# Patient Record
Sex: MALE | Employment: OTHER | URBAN - METROPOLITAN AREA
[De-identification: names, ages, dates, MRNs, and addresses within clinical notes are randomized per-mention and may not be internally consistent; named-entity substitution may affect disease eponyms.]

---

## 2017-01-05 ENCOUNTER — OFFICE VISIT (OUTPATIENT)
Dept: FAMILY MEDICINE CLINIC | Age: 67
End: 2017-01-05

## 2017-01-05 VITALS
SYSTOLIC BLOOD PRESSURE: 132 MMHG | HEART RATE: 82 BPM | HEIGHT: 73 IN | TEMPERATURE: 97.7 F | OXYGEN SATURATION: 98 % | BODY MASS INDEX: 33.29 KG/M2 | DIASTOLIC BLOOD PRESSURE: 76 MMHG | WEIGHT: 251.2 LBS | RESPIRATION RATE: 28 BRPM

## 2017-01-05 DIAGNOSIS — E78.2 MIXED HYPERLIPIDEMIA: ICD-10-CM

## 2017-01-05 DIAGNOSIS — K21.00 GASTROESOPHAGEAL REFLUX DISEASE WITH ESOPHAGITIS: ICD-10-CM

## 2017-01-05 DIAGNOSIS — J30.89 PERENNIAL ALLERGIC RHINITIS, UNSPECIFIED ALLERGIC RHINITIS TRIGGER: ICD-10-CM

## 2017-01-05 DIAGNOSIS — I10 ESSENTIAL HYPERTENSION, BENIGN: Primary | ICD-10-CM

## 2017-01-05 RX ORDER — PREDNISOLONE ACETATE 10 MG/ML
SUSPENSION/ DROPS OPHTHALMIC
Refills: 1 | COMMUNITY
Start: 2016-12-29 | End: 2019-10-07

## 2017-01-05 NOTE — MR AVS SNAPSHOT
Visit Information Date & Time Provider Department Dept. Phone Encounter #  
 1/5/2017  9:45 AM Nate Taylor [phone] [id number] Follow-up Instructions Return in about 4 months (around 5/5/2017). Upcoming Health Maintenance Date Due ZOSTER VACCINE AGE 60> 11/8/2012 COLONOSCOPY 2/17/2024 DTaP/Tdap/Td series (2 - Td) 7/8/2026 Allergies as of 1/5/2017  Review Complete On: 1/5/2017 By: Will Flores Severity Noted Reaction Type Reactions Diclofenac  08/11/2010    Other (comments) Ringing in ears Propoxyphene-acetaminophen  08/11/2010    Rash  
 Sulfa (Sulfonamide Antibiotics)  08/11/2010    Shortness of Breath Pt states he is not allergic. Zithromax [Azithromycin]  08/08/2014    Rash Current Immunizations  Reviewed on 10/20/2014 Name Date Influenza Vaccine 10/20/2014 Influenza Vaccine PF 1/3/2014 Not reviewed this visit You Were Diagnosed With   
  
 Codes Comments Essential hypertension, benign    -  Primary ICD-10-CM: I10 
ICD-9-CM: 401.1 Gastroesophageal reflux disease with esophagitis     ICD-10-CM: K21.0 ICD-9-CM: 530.11 Mixed hyperlipidemia     ICD-10-CM: E78.2 ICD-9-CM: 272.2 Perennial allergic rhinitis, unspecified allergic rhinitis trigger     ICD-10-CM: J30.89 ICD-9-CM: 477.8 Vitals BP Pulse Temp Resp Height(growth percentile) Weight(growth percentile) 132/76 (BP 1 Location: Left arm) 82 97.7 °F (36.5 °C) (Oral) 28 6' 1\" (1.854 m) 251 lb 3.2 oz (113.9 kg) SpO2 BMI Smoking Status 98% 33.14 kg/m2 Never Smoker Vitals History BMI and BSA Data Body Mass Index Body Surface Area  
 33.14 kg/m 2 2.42 m 2 Preferred Pharmacy Pharmacy Name Phone [id number] [address] [facility] AT [address] [place] [address] [phone] Your Updated Medication List  
  
   
 This list is accurate as of: 1/5/17 10:19 AM.  Always use your most recent med list.  
  
  
  
  
 albuterol 90 mcg/actuation inhaler Commonly known as:  PROAIR HFA Take 2 Puffs by inhalation every six (6) hours as needed for Wheezing. ALPHAGAN P 0.15 % ophthalmic solution Generic drug:  brimonidine Administer 1 Drop to both eyes three (3) times daily. ALREX 0.2 % Drps Generic drug:  loteprednol etabonate Administer 1 Drop to both eyes four (4) times daily. amLODIPine 10 mg tablet Commonly known as:  Tad Jamie Take 1 Tab by mouth daily. aspirin 81 mg tablet Take 81 mg by mouth. azelastine 137 mcg (0.1 %) nasal spray Commonly known as:  ASTELIN  
1 Spray by Both Nostrils route two (2) times a day. Use in each nostril as directed AZOPT 1 % ophthalmic suspension Generic drug:  brinzolamide BETIMOL 0.5 % ophthalmic solution Generic drug:  timolol Administer 1 Drop to both eyes two (2) times a day. use in affected eye(s)  
  
 fluticasone 50 mcg/actuation nasal spray Commonly known as:  Araceli Shames 2 Sprays by Both Nostrils route daily for 360 days. hydroCHLOROthiazide 12.5 mg capsule Commonly known as:  Vertie Cheadle TAKE ONE CAPSULE BY MOUTH EVERY DAY  
  
 ibuprofen 800 mg tablet Commonly known as:  MOTRIN  
TAKE 1 TABLET BY MOUTH EVERY 8 HOURS AS NEEDED FOR PAIN  
  
 indomethacin 50 mg capsule Commonly known as:  INDOCIN  
TAKE 1 CAPSULE TWICE A DAY  
  
 LUMIGAN 0.01 % ophthalmic drops Generic drug:  bimatoprost  
  
 methazolAMIDE 50 mg tablet Commonly known as:  NEPTAZANE  
  
 NASAL SPRAY (OXYMETAZOLINE) 0.05 % nasal spray Generic drug:  oxymetazoline INHALE 2 SPRAYS IN EACH OSTRIL EVERY 4 HOURS FOR 10 DAYS  
  
 prednisoLONE acetate 1 % ophthalmic suspension Commonly known as:  PRED FORTE INSTILL 1 DROP INTO RIGHT EYE 4 TIMES A DAY  
  
 ramipril 10 mg capsule Commonly known as:  ALTACE  
 TAKE 2 CAPSULES BY MOUTH EVERY DAY  
  
 SALINE NASAL 0.65 % nasal spray Generic drug:  sodium chloride INHALE 2 SPRAYS IN EACH NOSTRIL 4 TIMES A DAY FOR 14 DAYS  
  
 sodium chloride irrigation 0.9 % irrigation IRRIGATE EACH NASAL CAVITY WITH 60CCS OF NORMAL SALINE SOLUTION DAILY. DISP: 1 LITER Follow-up Instructions Return in about 4 months (around 5/5/2017). Introducing [facility] SERVICES! Faviola Titus introduces "[employer] patient portal. Now you can access parts of your medical record, email your doctor's office, and request medication refills online. 1. In your internet browser, go to [email or web address] 2. Click on the First Time User? Click Here link in the Sign In box. You will see the New Member Sign Up page. 3. Enter your "[employer] Access Code exactly as it appears below. You will not need to use this code after youve completed the sign-up process. If you do not sign up before the expiration date, you must request a new code. · "[employer] Access Code: [email or web address] Expires: 4/5/2017 10:19 AM 
 
4. Enter the last four digits of your Social Security Number (xxxx) and Date of Birth (mm/dd/yyyy) as indicated and click Submit. You will be taken to the next sign-up page. 5. Create a "[employer] ID. This will be your "[employer] login ID and cannot be changed, so think of one that is secure and easy to remember. 6. Create a "[employer] password. You can change your password at any time. 7. Enter your Password Reset Question and Answer. This can be used at a later time if you forget your password. 8. Enter your e-mail address. You will receive e-mail notification when new information is available in [address] 9. Click Sign Up. You can now view and download portions of your medical record. 10. Click the Download Summary menu link to download a portable copy of your medical information.  
 
If you have questions, please visit the Frequently Asked Questions section of the [employer]. Remember, [employer] is NOT to be used for urgent needs. For medical emergencies, dial 911. Now available from your iPhone and Android! Please provide this summary of care documentation to your next provider. Your primary care clinician is listed as Kandis Auguste. If you have any questions after today's visit, please call [phone].

## 2017-01-05 NOTE — PROGRESS NOTES
HISTORY OF PRESENT ILLNESS  Vnadana Lopez is a 61 y.o. male. f/u hbp,chol,gout,oa,s/p recent eye surgery  Hypertension    The history is provided by the patient. This is a chronic problem. The problem has not changed since onset. Pertinent negatives include no chest pain, no orthopnea, no palpitations, no malaise/fatigue, no headaches and no peripheral edema. Cholesterol Problem   This is a chronic problem. The problem occurs daily. The problem has not changed since onset. Pertinent negatives include no chest pain and no headaches. Review of Systems   Constitutional: Negative for fever, malaise/fatigue and weight loss. Cardiovascular: Negative for chest pain, palpitations and orthopnea. Genitourinary: Negative for dysuria and urgency. Musculoskeletal: Positive for joint pain. Neurological: Negative for headaches. Physical Exam   Constitutional: He is oriented to person, place, and time. He appears well-developed and well-nourished. HENT:   Head: Normocephalic and atraumatic. Right Ear: External ear normal.   Left Ear: External ear normal.   Nose: Nose normal.   Mouth/Throat: Oropharynx is clear and moist.   Eyes: Conjunctivae are normal. Pupils are equal, round, and reactive to light. Neck: Normal range of motion. Neck supple. No tracheal deviation present. No thyromegaly present. Cardiovascular: Normal rate, regular rhythm, normal heart sounds and intact distal pulses. Pulmonary/Chest: Effort normal and breath sounds normal. No respiratory distress. He has no wheezes. Abdominal: Soft. Bowel sounds are normal. He exhibits no distension. Lymphadenopathy:     He has no cervical adenopathy. Neurological: He is alert and oriented to person, place, and time. Skin: Skin is warm and dry. Psychiatric: He has a normal mood and affect. Vitals reviewed. ASSESSMENT and PLAN  Eun Elena was seen today for hypertension and cholesterol problem.     Diagnoses and all orders for this visit:    Essential hypertension, benign  -     METABOLIC PANEL, COMPREHENSIVE  -     CBC WITH AUTOMATED DIFF    Gastroesophageal reflux disease with esophagitis    Mixed hyperlipidemia  -     LIPID PANEL    Perennial allergic rhinitis, unspecified allergic rhinitis trigger    Doing well,continue current meds and treatments    Follow-up Disposition:  Return in about 4 months (around 5/5/2017).

## 2017-01-05 NOTE — PROGRESS NOTES
Chief Complaint   Patient presents with    Hypertension     F/U on BP.  Cholesterol Problem     F/U on cholesterol.

## 2017-01-06 LAB
ALBUMIN SERPL-MCNC: 4.5 G/DL (ref 3.6–4.8)
ALBUMIN/GLOB SERPL: 1.5 {RATIO} (ref 1.1–2.5)
ALP SERPL-CCNC: 83 IU/L (ref 39–117)
ALT SERPL-CCNC: 23 IU/L (ref 0–44)
AST SERPL-CCNC: 19 IU/L (ref 0–40)
BASOPHILS # BLD AUTO: 0.1 X10E3/UL (ref 0–0.2)
BASOPHILS NFR BLD AUTO: 1 %
BILIRUB SERPL-MCNC: 0.9 MG/DL (ref 0–1.2)
BUN SERPL-MCNC: 10 MG/DL (ref 8–27)
BUN/CREAT SERPL: 10 (ref 10–22)
CALCIUM SERPL-MCNC: 9.7 MG/DL (ref 8.6–10.2)
CHLORIDE SERPL-SCNC: 97 MMOL/L (ref 96–106)
CHOLEST SERPL-MCNC: 229 MG/DL (ref 100–199)
CO2 SERPL-SCNC: 24 MMOL/L (ref 18–29)
CREAT SERPL-MCNC: 1.01 MG/DL (ref 0.76–1.27)
EOSINOPHIL # BLD AUTO: 0.5 X10E3/UL (ref 0–0.4)
EOSINOPHIL NFR BLD AUTO: 6 %
ERYTHROCYTE [DISTWIDTH] IN BLOOD BY AUTOMATED COUNT: 14.5 % (ref 12.3–15.4)
GLOBULIN SER CALC-MCNC: 3.1 G/DL (ref 1.5–4.5)
GLUCOSE SERPL-MCNC: 107 MG/DL (ref 65–99)
HCT VFR BLD AUTO: 39.1 % (ref 37.5–51)
HDLC SERPL-MCNC: 45 MG/DL
HGB BLD-MCNC: 13.2 G/DL (ref 12.6–17.7)
IMM GRANULOCYTES # BLD: 0 X10E3/UL (ref 0–0.1)
IMM GRANULOCYTES NFR BLD: 0 %
INTERPRETATION, 910389: NORMAL
LDLC SERPL CALC-MCNC: 149 MG/DL (ref 0–99)
LYMPHOCYTES # BLD AUTO: 2.2 X10E3/UL (ref 0.7–3.1)
LYMPHOCYTES NFR BLD AUTO: 29 %
MCH RBC QN AUTO: 28.4 PG (ref 26.6–33)
MCHC RBC AUTO-ENTMCNC: 33.8 G/DL (ref 31.5–35.7)
MCV RBC AUTO: 84 FL (ref 79–97)
MONOCYTES # BLD AUTO: 0.7 X10E3/UL (ref 0.1–0.9)
MONOCYTES NFR BLD AUTO: 9 %
NEUTROPHILS # BLD AUTO: 4.3 X10E3/UL (ref 1.4–7)
NEUTROPHILS NFR BLD AUTO: 55 %
PLATELET # BLD AUTO: 339 X10E3/UL (ref 150–379)
POTASSIUM SERPL-SCNC: 4 MMOL/L (ref 3.5–5.2)
PROT SERPL-MCNC: 7.6 G/DL (ref 6–8.5)
RBC # BLD AUTO: 4.65 X10E6/UL (ref 4.14–5.8)
SODIUM SERPL-SCNC: 138 MMOL/L (ref 134–144)
TRIGL SERPL-MCNC: 173 MG/DL (ref 0–149)
VLDLC SERPL CALC-MCNC: 35 MG/DL (ref 5–40)
WBC # BLD AUTO: 7.7 X10E3/UL (ref 3.4–10.8)

## 2017-01-12 NOTE — TELEPHONE ENCOUNTER
----- Message from Velia Webb sent at 1/12/2017 11:47 AM EST -----  Regarding: Dr aMrio Butler  Contact: [phone]  Pt is requesting a refill on his Vertigo medication to be sent to the [facility] on file

## 2017-01-13 RX ORDER — MECLIZINE HYDROCHLORIDE 25 MG/1
25 TABLET ORAL
Qty: 40 TAB | Refills: 1 | Status: SHIPPED | OUTPATIENT
Start: 2017-01-13 | End: 2017-01-23

## 2017-02-08 ENCOUNTER — HOSPITAL ENCOUNTER (EMERGENCY)
Age: 67
Discharge: HOME | End: 2017-02-08
Attending: EMERGENCY MEDICINE
Payer: COMMERCIAL

## 2017-02-08 ENCOUNTER — APPOINTMENT (OUTPATIENT)
Dept: GENERAL RADIOLOGY | Age: 67
End: 2017-02-08
Attending: EMERGENCY MEDICINE
Payer: COMMERCIAL

## 2017-02-08 ENCOUNTER — APPOINTMENT (OUTPATIENT)
Dept: ULTRASOUND IMAGING | Age: 67
End: 2017-02-08
Attending: EMERGENCY MEDICINE
Payer: COMMERCIAL

## 2017-02-08 VITALS
OXYGEN SATURATION: 96 % | HEIGHT: 73 IN | BODY MASS INDEX: 32.52 KG/M2 | RESPIRATION RATE: 16 BRPM | DIASTOLIC BLOOD PRESSURE: 83 MMHG | TEMPERATURE: 98.3 F | SYSTOLIC BLOOD PRESSURE: 143 MMHG | HEART RATE: 104 BPM | WEIGHT: 245.37 LBS

## 2017-02-08 DIAGNOSIS — R42 LIGHTHEADED: Primary | ICD-10-CM

## 2017-02-08 LAB
ALBUMIN SERPL BCP-MCNC: 4.2 G/DL (ref 3.5–5)
ALBUMIN/GLOB SERPL: 1.1 {RATIO} (ref 1.1–2.2)
ALP SERPL-CCNC: 85 U/L (ref 45–117)
ALT SERPL-CCNC: 29 U/L (ref 12–78)
ANION GAP BLD CALC-SCNC: 9 MMOL/L (ref 5–15)
APPEARANCE UR: CLEAR
AST SERPL W P-5'-P-CCNC: 20 U/L (ref 15–37)
BACTERIA URNS QL MICRO: NEGATIVE /HPF
BASOPHILS # BLD AUTO: 0.1 K/UL (ref 0–0.1)
BASOPHILS # BLD: 1 % (ref 0–1)
BILIRUB SERPL-MCNC: 1 MG/DL (ref 0.2–1)
BILIRUB UR QL: NEGATIVE
BNP SERPL-MCNC: 51 PG/ML (ref 0–125)
BUN SERPL-MCNC: 10 MG/DL (ref 6–20)
BUN/CREAT SERPL: 10 (ref 12–20)
CALCIUM SERPL-MCNC: 9.5 MG/DL (ref 8.5–10.1)
CHLORIDE SERPL-SCNC: 101 MMOL/L (ref 97–108)
CK SERPL-CCNC: 130 U/L (ref 39–308)
CO2 SERPL-SCNC: 29 MMOL/L (ref 21–32)
COLOR UR: NORMAL
CREAT SERPL-MCNC: 1.02 MG/DL (ref 0.7–1.3)
EOSINOPHIL # BLD: 0.4 K/UL (ref 0–0.4)
EOSINOPHIL NFR BLD: 4 % (ref 0–7)
EPITH CASTS URNS QL MICRO: NORMAL /LPF
ERYTHROCYTE [DISTWIDTH] IN BLOOD BY AUTOMATED COUNT: 13.7 % (ref 11.5–14.5)
GLOBULIN SER CALC-MCNC: 3.9 G/DL (ref 2–4)
GLUCOSE SERPL-MCNC: 107 MG/DL (ref 65–100)
GLUCOSE UR STRIP.AUTO-MCNC: NEGATIVE MG/DL
HCT VFR BLD AUTO: 41.2 % (ref 36.6–50.3)
HGB BLD-MCNC: 14.1 G/DL (ref 12.1–17)
HGB UR QL STRIP: NEGATIVE
HYALINE CASTS URNS QL MICRO: NORMAL /LPF (ref 0–5)
KETONES UR QL STRIP.AUTO: NEGATIVE MG/DL
LEUKOCYTE ESTERASE UR QL STRIP.AUTO: NEGATIVE
LYMPHOCYTES # BLD AUTO: 38 % (ref 12–49)
LYMPHOCYTES # BLD: 3.2 K/UL (ref 0.8–3.5)
MCH RBC QN AUTO: 29.2 PG (ref 26–34)
MCHC RBC AUTO-ENTMCNC: 34.2 G/DL (ref 30–36.5)
MCV RBC AUTO: 85.3 FL (ref 80–99)
MONOCYTES # BLD: 0.6 K/UL (ref 0–1)
MONOCYTES NFR BLD AUTO: 8 % (ref 5–13)
NEUTS SEG # BLD: 4 K/UL (ref 1.8–8)
NEUTS SEG NFR BLD AUTO: 49 % (ref 32–75)
NITRITE UR QL STRIP.AUTO: NEGATIVE
PH UR STRIP: 8 [PH] (ref 5–8)
PLATELET # BLD AUTO: 319 K/UL (ref 150–400)
POTASSIUM SERPL-SCNC: 3.4 MMOL/L (ref 3.5–5.1)
PROT SERPL-MCNC: 8.1 G/DL (ref 6.4–8.2)
PROT UR STRIP-MCNC: NEGATIVE MG/DL
RBC # BLD AUTO: 4.83 M/UL (ref 4.1–5.7)
RBC #/AREA URNS HPF: NORMAL /HPF (ref 0–5)
SODIUM SERPL-SCNC: 139 MMOL/L (ref 136–145)
SP GR UR REFRACTOMETRY: 1.01 (ref 1–1.03)
TROPONIN I SERPL-MCNC: <0.04 NG/ML
UA: UC IF INDICATED,UAUC: NORMAL
UROBILINOGEN UR QL STRIP.AUTO: 0.2 EU/DL (ref 0.2–1)
WBC # BLD AUTO: 8.3 K/UL (ref 4.1–11.1)
WBC URNS QL MICRO: NORMAL /HPF (ref 0–4)

## 2017-02-08 PROCEDURE — 96360 HYDRATION IV INFUSION INIT: CPT

## 2017-02-08 PROCEDURE — 80053 COMPREHEN METABOLIC PANEL: CPT | Performed by: EMERGENCY MEDICINE

## 2017-02-08 PROCEDURE — 82550 ASSAY OF CK (CPK): CPT | Performed by: EMERGENCY MEDICINE

## 2017-02-08 PROCEDURE — 93970 EXTREMITY STUDY: CPT

## 2017-02-08 PROCEDURE — 85025 COMPLETE CBC W/AUTO DIFF WBC: CPT | Performed by: EMERGENCY MEDICINE

## 2017-02-08 PROCEDURE — 84484 ASSAY OF TROPONIN QUANT: CPT | Performed by: EMERGENCY MEDICINE

## 2017-02-08 PROCEDURE — 99284 EMERGENCY DEPT VISIT MOD MDM: CPT

## 2017-02-08 PROCEDURE — 36415 COLL VENOUS BLD VENIPUNCTURE: CPT | Performed by: EMERGENCY MEDICINE

## 2017-02-08 PROCEDURE — 83880 ASSAY OF NATRIURETIC PEPTIDE: CPT | Performed by: EMERGENCY MEDICINE

## 2017-02-08 PROCEDURE — 93005 ELECTROCARDIOGRAM TRACING: CPT

## 2017-02-08 PROCEDURE — 74011250636 HC RX REV CODE- 250/636: Performed by: EMERGENCY MEDICINE

## 2017-02-08 PROCEDURE — 71020 XR CHEST PA LAT: CPT

## 2017-02-08 PROCEDURE — 81001 URINALYSIS AUTO W/SCOPE: CPT | Performed by: EMERGENCY MEDICINE

## 2017-02-08 RX ORDER — MECLIZINE HCL 12.5 MG 12.5 MG/1
TABLET ORAL
COMMUNITY
End: 2017-07-10 | Stop reason: SDUPTHER

## 2017-02-08 RX ADMIN — SODIUM CHLORIDE 500 ML: 900 INJECTION, SOLUTION INTRAVENOUS at 19:25

## 2017-02-08 NOTE — ED PROVIDER NOTES
HPI Comments: Shearon Opitz is a 61 y.o. male who has a h/o hypertension and glaucoma and presents to [address] [facility] ED ambulatory with cc of lightheadedness which is exacerbated with standing x 2-3 weeks. The patient denies dizziness, \"room spinning\" sensation. He denies any syncopal episodes. In addition to his chief complaint, the patient also reports that his chronic BLE edema appears to have progressed x 2 weeks. He denies any known trauma or injuries to his BLE or any recent major surgeries or long trips. He also denies any h/o heart failure or PE/DVT. Pt also reports concern for elevated blood pressure. He saw his PCP 1.5 weeks ago at which time he was recommended to decrease his amlodipine from 10mg to 5mg; he was also started on meclizine 12.5 mg. The patient was compliant with these recommendations however it did not decrease his BP or improve his light-headedness, and as such he called his PCP 5 days ago, and was then recommended to increase his ramipril. Pt notes history of glaucoma and is followed by ophthalmology who he recently saw and had eye drop medication doses adjusted. He denies all other symptoms at this time, including any headaches, eye pain, chest pain, sob, coughing, nausea or vomiting. PCP: Rafy Clement MD    There are no other complaints changes or physical findings at this time  Written by Va Cai ED Scribe as dictated by Vineet Bell MD.    The history is provided by the patient.         Past Medical History:   Diagnosis Date    Allergic rhinitis, cause unspecified 11/4/2010    Asthma 11/4/2010    Cervical radiculitis 11/9/2012    Encounter for long-term (current) use of other medications 11/26/2010    Essential hypertension, benign 11/4/2010    Glaucoma 11/4/2010    Hypertension     Mixed hyperlipidemia 5/27/2011    Nocturia 11/9/2012    OA (osteoarthritis) 11/26/2010       Past Surgical History:   Procedure Laterality Date    Pr nasal scopy,open maxill sinus      Hx colonoscopy           Family History:   Problem Relation Age of Onset    No Known Problems Mother     No Known Problems Father     No Known Problems Brother     Cancer Brother        Social History     Social History    Marital status:      Spouse name: N/A    Number of children: N/A    Years of education: N/A     Occupational History    Not on file. Social History Main Topics    Smoking status: Never Smoker    Smokeless tobacco: Never Used    Alcohol use No    Drug use: No    Sexual activity: Yes     Partners: Female     Other Topics Concern    Not on file     Social History Narrative         ALLERGIES: Diclofenac; Propoxyphene-acetaminophen; Sulfa (sulfonamide antibiotics); and Zithromax [azithromycin]    Review of Systems   Constitutional: Negative for chills, fatigue and fever. HENT: Negative for congestion, rhinorrhea and sore throat. Eyes: Negative for pain, discharge and visual disturbance. Respiratory: Negative for cough, chest tightness, shortness of breath and wheezing. Cardiovascular: Positive for leg swelling. Negative for chest pain and palpitations. Gastrointestinal: Negative for abdominal pain, constipation, diarrhea, nausea and vomiting. Genitourinary: Negative for dysuria, frequency and hematuria. Musculoskeletal: Negative for arthralgias, back pain and myalgias. Skin: Negative for rash. Neurological: Positive for light-headedness. Negative for dizziness, syncope, weakness and headaches. Psychiatric/Behavioral: Negative. All other systems reviewed and are negative.       Patient Vitals for the past 12 hrs:   Temp Pulse Resp BP SpO2   02/08/17 2030 - - - 143/83 96 %   02/08/17 2015 - - - (!) 137/92 97 %   02/08/17 2000 - - - 139/86 97 %   02/08/17 1945 - - - 133/82 96 %   02/08/17 1930 - - - 136/86 96 %   02/08/17 1915 - - - 136/84 96 %   02/08/17 1910 - - - 153/88 -   02/08/17 1733 - - - (!) 180/96 97 %   02/08/17 1732 - - - 173/88 100 %   02/08/17 1730 - - - (!) 176/93 99 %   02/08/17 1615 - (!) 104 16 (!) 151/92 97 %   02/08/17 1447 98.3 °F (36.8 °C) 99 16 (!) 190/102 96 %       Physical Exam   Constitutional: He is oriented to person, place, and time. He appears well-developed and well-nourished. No distress. HENT:   Head: Normocephalic and atraumatic. Eyes: EOM are normal. Right eye exhibits no discharge. Left eye exhibits no discharge. No scleral icterus. Neck: Normal range of motion. Neck supple. No tracheal deviation present. Cardiovascular: Normal rate, regular rhythm, normal heart sounds and intact distal pulses. Exam reveals no gallop and no friction rub. No murmur heard. 2+ BLE edema   Pulmonary/Chest: Effort normal and breath sounds normal. No respiratory distress. He has no wheezes. He has no rales. Abdominal: Soft. He exhibits no distension. There is no tenderness. Musculoskeletal: Normal range of motion. No calf tenderness   Lymphadenopathy:     He has no cervical adenopathy. Neurological: He is alert and oriented to person, place, and time. No focal deficits, normal speech, moving extremities equally, facial symmetry   Skin: Skin is warm and dry. No rash noted. Psychiatric: He has a normal mood and affect. MDM  Number of Diagnoses or Management Options  Lightheaded:   Diagnosis management comments:     Patient presents to ED with lightheadedness. Symptoms worsen with standing. He appears well on exam, neurologically intact. Suspect vasovagal/orthostatic component given history.   Differential also includes dehydration, electrolyte abnormality, UTI, atypical ACS, arrhythmia, heart failure, DVT  - CBC, CMP, UA  - Duy  - Duplex to evaluate for DVT given LE edema  - Orthostatics  - IVF and reevaluate         Amount and/or Complexity of Data Reviewed  Clinical lab tests: ordered and reviewed  Tests in the radiology section of CPT®: ordered and reviewed  Tests in the medicine section of CPT®: ordered and reviewed  Review and summarize past medical records: yes  Independent visualization of images, tracings, or specimens: yes    Patient Progress  Patient progress: stable    ED Course       Procedures      EKG interpretation: (Preliminary) 1455  Rhythm: normal sinus rhythm; and regular . Rate (approx.): 99; Axis: normal; QRS interval: prolonged; QTc Prolonged; Other findings: RBBB. Unchanged from prior EKG's. PROGRESS NOTE:  8:51PM  Patient successfully ambulated without assistance - he reports feeling better. Labs unremarkable. Will discharge with PCP follow up. Discussed results and follow up plan with patient. Provided customary return to ED instructions. Patient expressed understanding.   Velia Stanley MD    LABORATORY TESTS:  Recent Results (from the past 12 hour(s))   EKG, 12 LEAD, INITIAL    Collection Time: 02/08/17  2:55 PM   Result Value Ref Range    Ventricular Rate 99 BPM    Atrial Rate 99 BPM    P-R Interval 192 ms    QRS Duration 154 ms    Q-T Interval 398 ms    QTC Calculation (Bezet) 510 ms    Calculated P Axis 53 degrees    Calculated R Axis 10 degrees    Calculated T Axis 2 degrees    Diagnosis       ** Poor data quality, interpretation may be adversely affected  Normal sinus rhythm  Possible Left atrial enlargement  Right bundle branch block  Abnormal ECG  When compared with ECG of 28-FEB-1999 13:38,  Previous ECG has undetermined rhythm, needs review  Right bundle branch block has replaced Incomplete right bundle branch block     CBC WITH AUTOMATED DIFF    Collection Time: 02/08/17  3:16 PM   Result Value Ref Range    WBC 8.3 4.1 - 11.1 K/uL    RBC 4.83 4.10 - 5.70 M/uL    HGB 14.1 12.1 - 17.0 g/dL    HCT 41.2 36.6 - 50.3 %    MCV 85.3 80.0 - 99.0 FL    MCH 29.2 26.0 - 34.0 PG    MCHC 34.2 30.0 - 36.5 g/dL    RDW 13.7 11.5 - 14.5 %    PLATELET 832 334 - 265 K/uL    NEUTROPHILS 49 32 - 75 %    LYMPHOCYTES 38 12 - 49 %    MONOCYTES 8 5 - 13 %    EOSINOPHILS 4 0 - 7 % BASOPHILS 1 0 - 1 %    ABS. NEUTROPHILS 4.0 1.8 - 8.0 K/UL    ABS. LYMPHOCYTES 3.2 0.8 - 3.5 K/UL    ABS. MONOCYTES 0.6 0.0 - 1.0 K/UL    ABS. EOSINOPHILS 0.4 0.0 - 0.4 K/UL    ABS. BASOPHILS 0.1 0.0 - 0.1 K/UL   METABOLIC PANEL, COMPREHENSIVE    Collection Time: 02/08/17  3:16 PM   Result Value Ref Range    Sodium 139 136 - 145 mmol/L    Potassium 3.4 (L) 3.5 - 5.1 mmol/L    Chloride 101 97 - 108 mmol/L    CO2 29 21 - 32 mmol/L    Anion gap 9 5 - 15 mmol/L    Glucose 107 (H) 65 - 100 mg/dL    BUN 10 6 - 20 MG/DL    Creatinine 1.02 0.70 - 1.30 MG/DL    BUN/Creatinine ratio 10 (L) 12 - 20      GFR est AA >60 >60 ml/min/1.73m2    GFR est non-AA >60 >60 ml/min/1.73m2    Calcium 9.5 8.5 - 10.1 MG/DL    Bilirubin, total 1.0 0.2 - 1.0 MG/DL    ALT (SGPT) 29 12 - 78 U/L    AST (SGOT) 20 15 - 37 U/L    Alk.  phosphatase 85 45 - 117 U/L    Protein, total 8.1 6.4 - 8.2 g/dL    Albumin 4.2 3.5 - 5.0 g/dL    Globulin 3.9 2.0 - 4.0 g/dL    A-G Ratio 1.1 1.1 - 2.2     TROPONIN I    Collection Time: 02/08/17  3:16 PM   Result Value Ref Range    Troponin-I, Qt. <0.04 <0.05 ng/mL   CK W/ REFLX CKMB    Collection Time: 02/08/17  3:16 PM   Result Value Ref Range     39 - 308 U/L   PRO-BNP    Collection Time: 02/08/17  3:16 PM   Result Value Ref Range    NT pro-BNP 51 0 - 125 PG/ML   URINALYSIS W/ REFLEX CULTURE    Collection Time: 02/08/17  8:18 PM   Result Value Ref Range    Color YELLOW/STRAW      Appearance CLEAR CLEAR      Specific gravity 1.007 1.003 - 1.030      pH (UA) 8.0 5.0 - 8.0      Protein NEGATIVE  NEG mg/dL    Glucose NEGATIVE  NEG mg/dL    Ketone NEGATIVE  NEG mg/dL    Bilirubin NEGATIVE  NEG      Blood NEGATIVE  NEG      Urobilinogen 0.2 0.2 - 1.0 EU/dL    Nitrites NEGATIVE  NEG      Leukocyte Esterase NEGATIVE  NEG      WBC 0-4 0 - 4 /hpf    RBC 0-5 0 - 5 /hpf    Epithelial cells FEW FEW /lpf    Bacteria NEGATIVE  NEG /hpf    UA:UC IF INDICATED CULTURE NOT INDICATED BY UA RESULT CNI      Hyaline cast 0-2 0 - 5 /lpf       IMAGING RESULTS:  CXR Results  (Last 48 hours)               02/08/17 1635  XR CHEST PA LAT Final result    Impression:  IMPRESSION:   No acute process. Narrative:  INDICATION:   dizziness        EXAM:  PA and Lateral Chest Radiographs       COMPARISON: None       FINDINGS:       PA and lateral views of the chest demonstrate a normal cardiomediastinal   silhouette. The lungs are adequately expanded. There is no edema, effusion,   consolidation, or pneumothorax. The osseous structures are unremarkable. HISTORY: lower extremity edema BL     COMPARISON: None     FINDINGS:     Grayscale and color Doppler sonographic imaging of the bilateral lower  extremities was performed. There is no evidence of filling defect within the  lower extremity veins, with normal compressibility and flow. Spectral analysis  was utilized.     IMPRESSION  IMPRESSION:     1. No deep venous thrombosis.        MEDICATIONS GIVEN:  Medications   sodium chloride 0.9 % bolus infusion 500 mL (0 mL IntraVENous IV Completed 2/8/17 2046)       IMPRESSION:  1. Lightheaded        PLAN:  1.    Discharge Medication List as of 2/8/2017  8:50 PM      CONTINUE these medications which have NOT CHANGED    Details   meclizine (ANTIVERT) 12.5 mg tablet Take  by mouth three (3) times daily as needed., Historical Med      prednisoLONE acetate (PRED FORTE) 1 % ophthalmic suspension INSTILL 1 DROP INTO RIGHT EYE 4 TIMES A DAY, Historical Med, R-1      methazolAMIDE (NEPTAZANE) 50 mg tablet Historical Med      indomethacin (INDOCIN) 50 mg capsule TAKE 1 CAPSULE TWICE A DAY, Historical Med, R-6      hydroCHLOROthiazide (MICROZIDE) 12.5 mg capsule TAKE ONE CAPSULE BY MOUTH EVERY DAY, Normal, Disp-30 Cap, R-8      ibuprofen (MOTRIN) 800 mg tablet TAKE 1 TABLET BY MOUTH EVERY 8 HOURS AS NEEDED FOR PAIN, Normal, Disp-50 Tab, R-2      NASAL SPRAY, OXYMETAZOLINE, 0.05 % nasal spray INHALE 2 SPRAYS IN EACH OSTRIL EVERY 4 HOURS FOR 10 DAYS, Historical Med, R-0, ELIAS      sodium chloride irrigation 0.9 % irrigation IRRIGATE EACH NASAL CAVITY WITH 60CCS OF NORMAL SALINE SOLUTION DAILY. DISP: 1 LITER, Historical Med, R-10      SALINE NASAL 0.65 % nasal spray INHALE 2 SPRAYS IN EACH NOSTRIL 4 TIMES A DAY FOR 14 DAYS, Historical Med, R-2, ELIAS      albuterol (PROAIR HFA) 90 mcg/actuation inhaler Take 2 Puffs by inhalation every six (6) hours as needed for Wheezing., Normal, Disp-1 Inhaler, R-11      ramipril (ALTACE) 10 mg capsule TAKE 2 CAPSULES BY MOUTH EVERY DAY, Normal, Disp-60 Cap, R-11      amLODIPine (NORVASC) 10 mg tablet Take 1 Tab by mouth daily. , Print, Disp-30 Tab, R-11      fluticasone (FLONASE) 50 mcg/actuation nasal spray 2 Sprays by Both Nostrils route daily for 360 days. , Normal, Disp-1 Bottle, R-11      azelastine (ASTELIN) 137 mcg (0.1 %) nasal spray 1 [place] by Both Nostrils route two (2) times a day. Use in each nostril as directed, Normal, Disp-1 Bottle, R-5      LUMIGAN 0.01 % ophthalmic drops Historical Med, R-11      AZOPT 1 % ophthalmic suspension Historical Med, R-11      loteprednol etabonate (ALREX) 0.2 % drps Administer 1 Drop to both eyes four (4) times daily. , Historical Med      aspirin 81 mg tablet Take 81 mg by mouth.  , Historical Med      timolol (BETIMOL) 0.5 % ophthalmic solution Administer 1 Drop to both eyes two (2) times a day. use in affected eye(s) , Historical Med      brimonidine (ALPHAGAN P) 0.15 % ophthalmic solution Administer 1 Drop to both eyes three (3) times daily. , Historical Med           2. Follow-up Information     Follow up With Details Comments Contact Info    Rosa Argueta MD In 2 days  [address] [place] [address]  [phone]      [facility] EM[facility]  As needed, If symptoms worsen [address] [place] [address]  [phone]        Return to ED if worse     Discharge Note:  8:50 PM  The patient is ready for discharge.  The patient's signs, symptoms, diagnosis, and discharge instructions have been discussed and the patient has conveyed their understanding. The patient is to follow up as recommended or return to the ER should their symptoms worsen. Plan has been discussed and the patient is in agreement. This note is prepared by Katharine Davidson acting as Scribe for Tacos Guerrero MD.    Tacos Guerrero MD: The Scribe's documentation has been prepared under my direction and personally reviewed by me in its entirety. I confirm that the note above accurately reflects all work, treatment, procedures, and medical decision making performed by me.

## 2017-02-08 NOTE — ED NOTES
Assumed care of patient. Patient is alert and oriented, does not appear to be in distress. Patient ambulatory to ED with c/o dizziness and blurred vision x 2 weeks. Pt was seen at [facility] clinic on Monday and was told to decrease his dose of amlodipine from 10 mg to 5 mg/ day. Pt states his BP was up today and took an extra dose. Pt was increased on his Ramipril.  Pt had recent surgery on his right eye for glaucoma and was seen at his eye doctor 2 days ago

## 2017-02-09 LAB
ATRIAL RATE: 99 BPM
CALCULATED P AXIS, ECG09: 53 DEGREES
CALCULATED R AXIS, ECG10: 10 DEGREES
CALCULATED T AXIS, ECG11: 2 DEGREES
DIAGNOSIS, 93000: NORMAL
P-R INTERVAL, ECG05: 192 MS
Q-T INTERVAL, ECG07: 398 MS
QRS DURATION, ECG06: 154 MS
QTC CALCULATION (BEZET), ECG08: 510 MS
VENTRICULAR RATE, ECG03: 99 BPM

## 2017-02-09 NOTE — ED NOTES
Assumed care of patient from [facility]  , introduced self to patient as primary nurse at this time. Updated patient on plan of care at this time. Pt has no other questions at this time. Bed in lowest position and locked. Side rails up x2 and call bell within reach.

## 2017-02-09 NOTE — DISCHARGE INSTRUCTIONS
Lightheadedness or Faintness: Care Instructions  Your Care Instructions  Lightheadedness is a feeling that you are about to faint or \"pass out. \" You do not feel as if you or your surroundings are moving. It is different from vertigo, which is the feeling that you or things around you are spinning or tilting. Lightheadedness usually goes away or gets better when you lie down. If lightheadedness gets worse, it can lead to a fainting spell. It is common to feel lightheaded from time to time. Lightheadedness usually is not caused by a serious problem. It often is caused by a short-lasting drop in blood pressure and blood flow to your head that occurs when you get up too quickly from a seated or lying position. Follow-up care is a key part of your treatment and safety. Be sure to make and go to all appointments, and call your doctor if you are having problems. It's also a good idea to know your test results and keep a list of the medicines you take. How can you care for yourself at home? · Lie down for 1 or 2 minutes when you feel lightheaded. After lying down, sit up slowly and remain sitting for 1 to 2 minutes before slowly standing up. · Avoid movements, positions, or activities that have made you lightheaded in the past.  · Get plenty of rest, especially if you have a cold or flu, which can cause lightheadedness. · Make sure you drink plenty of fluids, especially if you have a fever or have been sweating. · Do not drive or put yourself and others in danger while you feel lightheaded. When should you call for help? Call 911 anytime you think you may need emergency care. For example, call if:  · You have symptoms of a stroke. These may include:  ¨ Sudden numbness, tingling, weakness, or loss of movement in your face, arm, or leg, especially on only one side of your body. ¨ Sudden vision changes. ¨ Sudden trouble speaking. ¨ Sudden confusion or trouble understanding simple statements.   ¨ Sudden problems with walking or balance. ¨ A sudden, severe headache that is different from past headaches. · You have symptoms of a heart attack. These may include:  ¨ Chest pain or pressure, or a strange feeling in the chest.  ¨ Sweating. ¨ Shortness of breath. ¨ Nausea or vomiting. ¨ Pain, pressure, or a strange feeling in the back, neck, jaw, or upper belly or in one or both shoulders or arms. ¨ Lightheadedness or sudden weakness. ¨ A fast or irregular heartbeat. After you call 911, the  may tell you to chew 1 adult-strength or 2 to 4 low-dose aspirin. Wait for an ambulance. Do not try to drive yourself. Watch closely for changes in your health, and be sure to contact your doctor if:  · Your lightheadedness gets worse or does not get better with home care. Where can you learn more? Go to http://treasure-luis.info/. Enter K645 in the search box to learn more about \"Lightheadedness or Faintness: Care Instructions. \"  Current as of: May 27, 2016  Content Version: 11.1  © 6991-4877 "[employer]". Care instructions adapted under license by [employer] (which disclaims liability or warranty for this information). If you have questions about a medical condition or this instruction, always ask your healthcare professional. [address] any warranty or liability for your use of this information.

## 2017-02-09 NOTE — ED NOTES
Pt given discharge instructions and prescripitions by Dr. Bartolo Bond . Pt able to verbalize understanding of these instructions at this time. No other questions. Pt able to declines wheelchair at this time and ambulates out of ED at this time.

## 2017-02-13 ENCOUNTER — OFFICE VISIT (OUTPATIENT)
Dept: FAMILY MEDICINE CLINIC | Age: 67
End: 2017-02-13

## 2017-02-13 VITALS
HEIGHT: 73 IN | WEIGHT: 247.4 LBS | SYSTOLIC BLOOD PRESSURE: 138 MMHG | BODY MASS INDEX: 32.79 KG/M2 | OXYGEN SATURATION: 96 % | HEART RATE: 85 BPM | RESPIRATION RATE: 26 BRPM | DIASTOLIC BLOOD PRESSURE: 82 MMHG | TEMPERATURE: 98.4 F

## 2017-02-13 DIAGNOSIS — J30.89 PERENNIAL ALLERGIC RHINITIS, UNSPECIFIED ALLERGIC RHINITIS TRIGGER: ICD-10-CM

## 2017-02-13 DIAGNOSIS — R73.9 HYPERGLYCEMIA: ICD-10-CM

## 2017-02-13 DIAGNOSIS — I10 ESSENTIAL HYPERTENSION, BENIGN: Primary | ICD-10-CM

## 2017-02-13 LAB — HBA1C MFR BLD HPLC: 5.6 %

## 2017-02-13 RX ORDER — AMLODIPINE BESYLATE 10 MG/1
10 TABLET ORAL DAILY
Qty: 30 TAB | Refills: 11 | Status: SHIPPED | OUTPATIENT
Start: 2017-02-13 | End: 2017-03-17 | Stop reason: ALTCHOICE

## 2017-02-13 NOTE — PROGRESS NOTES
HISTORY OF PRESENT ILLNESS  Pavithra Kelsey is a 61 y.o. male. f/u hbp lightheadedness. Seen in ER 2/8/17 with negative w/u. Briefly lightheaded  Hospital Follow Up   The history is provided by the patient. This is a new problem. The problem occurs daily. The problem has not changed since onset. Pertinent negatives include no chest pain, no abdominal pain, no headaches and no shortness of breath. Dizziness    This is a recurrent problem. The problem occurs daily. The problem has not changed since onset. Associated symptoms include dizziness. Pertinent negatives include no chest pain, no fever, no malaise/fatigue, no abdominal pain and no headaches. Review of Systems   Constitutional: Negative for fever and malaise/fatigue. Eyes: Negative for pain. Respiratory: Negative for shortness of breath. Cardiovascular: Negative for chest pain. Gastrointestinal: Negative for abdominal pain. Genitourinary: Positive for dysuria. Neurological: Positive for dizziness. Negative for headaches. Physical Exam   Constitutional: He is oriented to person, place, and time. He appears well-developed and well-nourished. HENT:   Head: Normocephalic and atraumatic. Right Ear: External ear normal.   Left Ear: External ear normal.   Nose: Nose normal.   Mouth/Throat: Oropharynx is clear and moist.   Eyes: Conjunctivae are normal. Pupils are equal, round, and reactive to light. Neck: Normal range of motion. Neck supple. No tracheal deviation present. No thyromegaly present. Cardiovascular: Normal rate, regular rhythm, normal heart sounds and intact distal pulses. Pulmonary/Chest: Effort normal and breath sounds normal. No respiratory distress. He has no wheezes. Abdominal: Soft. Bowel sounds are normal. He exhibits no distension and no mass. There is no tenderness. There is no guarding. Musculoskeletal: Normal range of motion. Lymphadenopathy:     He has no cervical adenopathy.    Neurological: He is alert and oriented to person, place, and time. He has normal reflexes. Skin: Skin is warm and dry. No rash noted. Psychiatric: He has a normal mood and affect. His behavior is normal.   Vitals reviewed. ASSESSMENT and PLAN  Sue Mcnair was seen today for hospital follow up and dizziness. Diagnoses and all orders for this visit:    Essential hypertension, benign  -     amLODIPine (NORVASC) 10 mg tablet; Take 1 Tab by mouth daily. Perennial allergic rhinitis, unspecified allergic rhinitis trigger    Hyperglycemia  -     AMB POC HEMOGLOBIN A1C      Follow-up Disposition:  Return in about 4 weeks (around 3/13/2017).

## 2017-02-13 NOTE — MR AVS SNAPSHOT
Visit Information Date & Time Provider Department Dept. Phone Encounter #  
 2/13/2017 12:15 PM Jose David Rehman, [address] [facility] [phone] [id number] Follow-up Instructions Return in about 4 weeks (around 3/13/2017). Your Appointments 5/5/2017 10:00 AM  
ROUTINE CARE with Jose David Rehman MD  
[facility]) Appt Note: 4 month follow up  
 [address] [place]  [address] [phone] [address] Upcoming Health Maintenance Date Due ZOSTER VACCINE AGE 60> 11/8/2012 COLONOSCOPY 2/17/2024 DTaP/Tdap/Td series (2 - Td) 7/8/2026 Allergies as of 2/13/2017  Review Complete On: 2/13/2017 By: Ana Mccormick Severity Noted Reaction Type Reactions Diclofenac  08/11/2010    Other (comments) Ringing in ears Propoxyphene-acetaminophen  08/11/2010    Rash  
 Sulfa (Sulfonamide Antibiotics)  08/11/2010    Shortness of Breath Pt states he is not allergic. Zithromax [Azithromycin]  08/08/2014    Rash Current Immunizations  Reviewed on 10/20/2014 Name Date Influenza Vaccine 10/20/2014 Influenza Vaccine PF 1/3/2014 Not reviewed this visit You Were Diagnosed With   
  
 Codes Comments Essential hypertension, benign    -  Primary ICD-10-CM: I10 
ICD-9-CM: 401.1 Perennial allergic rhinitis, unspecified allergic rhinitis trigger     ICD-10-CM: J30.89 ICD-9-CM: 477.8 Hyperglycemia     ICD-10-CM: R73.9 ICD-9-CM: 790.29 Vitals BP Pulse Temp Resp Height(growth percentile) Weight(growth percentile) 138/82 (BP 1 Location: Right arm, BP Patient Position: Sitting) 85 98.4 °F (36.9 °C) (Oral) 26 6' 1\" (1.854 m) 247 lb 6.4 oz (112.2 kg) SpO2 BMI Smoking Status 96% 32.64 kg/m2 Never Smoker Vitals History BMI and BSA Data Body Mass Index Body Surface Area 32.64 kg/m 2 2.4 m 2 Preferred Pharmacy Pharmacy Name Phone [facility]/PHARMACY #[id number]- [place], [place] - [address] [facility] AT [address] [place] [address] [phone] Your Updated Medication List  
  
   
This list is accurate as of: 2/13/17 12:55 PM.  Always use your most recent med list.  
  
  
  
  
 albuterol 90 mcg/actuation inhaler Commonly known as:  PROAIR HFA Take 2 Puffs by inhalation every six (6) hours as needed for Wheezing. ALPHAGAN P 0.15 % ophthalmic solution Generic drug:  brimonidine Administer 1 Drop to both eyes three (3) times daily. ALREX 0.2 % Drps Generic drug:  loteprednol etabonate Administer 1 Drop to both eyes four (4) times daily. amLODIPine 10 mg tablet Commonly known as:  Murray Fanti Take 1 Tab by mouth daily. aspirin 81 mg tablet Take 81 mg by mouth. azelastine 137 mcg (0.1 %) nasal spray Commonly known as:  ASTELIN  
1 Spray by Both Nostrils route two (2) times a day. Use in each nostril as directed AZOPT 1 % ophthalmic suspension Generic drug:  brinzolamide BETIMOL 0.5 % ophthalmic solution Generic drug:  timolol Administer 1 Drop to both eyes two (2) times a day. use in affected eye(s)  
  
 fluticasone 50 mcg/actuation nasal spray Commonly known as:  Marito Cyphers 2 Sprays by Both Nostrils route daily for 360 days. hydroCHLOROthiazide 12.5 mg capsule Commonly known as:  Marshal Shape TAKE ONE CAPSULE BY MOUTH EVERY DAY  
  
 ibuprofen 800 mg tablet Commonly known as:  MOTRIN  
TAKE 1 TABLET BY MOUTH EVERY 8 HOURS AS NEEDED FOR PAIN  
  
 indomethacin 50 mg capsule Commonly known as:  INDOCIN  
TAKE 1 CAPSULE TWICE A DAY  
  
 LUMIGAN 0.01 % ophthalmic drops Generic drug:  bimatoprost  
  
 meclizine 12.5 mg tablet Commonly known as:  ANTIVERT Take  by mouth three (3) times daily as needed. methazolAMIDE 50 mg tablet Commonly known as:  NEPTAZANE NASAL SPRAY (OXYMETAZOLINE) 0.05 % nasal spray Generic drug:  oxymetazoline INHALE 2 SPRAYS IN EACH OSTRIL EVERY 4 HOURS FOR 10 DAYS  
  
 prednisoLONE acetate 1 % ophthalmic suspension Commonly known as:  PRED FORTE INSTILL 1 DROP INTO RIGHT EYE 4 TIMES A DAY  
  
 ramipril 10 mg capsule Commonly known as:  ALTACE  
TAKE 2 CAPSULES BY MOUTH EVERY DAY  
  
 SALINE NASAL 0.65 % nasal spray Generic drug:  sodium chloride INHALE 2 SPRAYS IN EACH NOSTRIL 4 TIMES A DAY FOR 14 DAYS  
  
 sodium chloride irrigation 0.9 % irrigation IRRIGATE EACH NASAL CAVITY WITH 60CCS OF NORMAL SALINE SOLUTION DAILY. DISP: 1 LITER Prescriptions Sent to Pharmacy Refills  
 amLODIPine (NORVASC) 10 mg tablet 11 Sig: Take 1 Tab by mouth daily. Class: Normal  
 Pharmacy: [facility]/pharmacy #[id number] AT [address] [place] [address] #: [phone] Route: Oral  
  
We Performed the Following AMB POC HEMOGLOBIN A1C [22213 CPT(R)] Follow-up Instructions Return in about 4 weeks (around 3/13/2017). Introducing \[facility]\"" & HEALTH SERVICES! Carrillo Courtney introduces [employer] patient portal. Now you can access parts of your medical record, email your doctor's office, and request medication refills online. 1. In your internet browser, go to [email or web address] 2. Click on the First Time User? Click Here link in the Sign In box. You will see the New Member Sign Up page. 3. Enter your [employer] Access Code exactly as it appears below. You will not need to use this code after youve completed the sign-up process. If you do not sign up before the expiration date, you must request a new code. · [employer] Access Code: [email or web address] Expires: 4/5/2017 10:19 AM 
 
4. Enter the last four digits of your Social Security Number (xxxx) and Date of Birth (mm/dd/yyyy) as indicated and click Submit. You will be taken to the next sign-up page. 5. Create a [employer] ID. This will be your [employer] login ID and cannot be changed, so think of one that is secure and easy to remember. 6. Create a [employer] password. You can change your password at any time. 7. Enter your Password Reset Question and Answer. This can be used at a later time if you forget your password. 8. Enter your e-mail address. You will receive e-mail notification when new information is available in [address] 9. Click Sign Up. You can now view and download portions of your medical record. 10. Click the Download Summary menu link to download a portable copy of your medical information. If you have questions, please visit the Frequently Asked Questions section of the [employer] website. Remember, [employer] is NOT to be used for urgent needs. For medical emergencies, dial 911. Now available from your iPhone and Android! Please provide this summary of care documentation to your next provider. Your primary care clinician is listed as Jonny Win. If you have any questions after today's visit, please call [phone].

## 2017-02-13 NOTE — PROGRESS NOTES
Chief Complaint   Patient presents with   [facility] Follow Up     F/U from ED [facility] for elevated BP.  Dizziness     Pt states he is lightheaded.

## 2017-03-17 ENCOUNTER — OFFICE VISIT (OUTPATIENT)
Dept: FAMILY MEDICINE CLINIC | Age: 67
End: 2017-03-17

## 2017-03-17 ENCOUNTER — TELEPHONE (OUTPATIENT)
Dept: FAMILY MEDICINE CLINIC | Age: 67
End: 2017-03-17

## 2017-03-17 VITALS
BODY MASS INDEX: 33.32 KG/M2 | RESPIRATION RATE: 28 BRPM | WEIGHT: 251.4 LBS | HEART RATE: 89 BPM | TEMPERATURE: 97.6 F | DIASTOLIC BLOOD PRESSURE: 82 MMHG | SYSTOLIC BLOOD PRESSURE: 134 MMHG | OXYGEN SATURATION: 98 % | HEIGHT: 73 IN

## 2017-03-17 DIAGNOSIS — R60.0 BILATERAL LEG EDEMA: Primary | ICD-10-CM

## 2017-03-17 DIAGNOSIS — K21.00 GASTROESOPHAGEAL REFLUX DISEASE WITH ESOPHAGITIS: ICD-10-CM

## 2017-03-17 DIAGNOSIS — I10 ESSENTIAL HYPERTENSION, BENIGN: ICD-10-CM

## 2017-03-17 DIAGNOSIS — H40.9 GLAUCOMA OF BOTH EYES, UNSPECIFIED GLAUCOMA: ICD-10-CM

## 2017-03-17 RX ORDER — FUROSEMIDE 20 MG/1
20 TABLET ORAL DAILY
Qty: 30 TAB | Refills: 11 | Status: SHIPPED | OUTPATIENT
Start: 2017-03-17 | End: 2017-06-28

## 2017-03-17 RX ORDER — DILTIAZEM HYDROCHLORIDE 180 MG/1
180 CAPSULE, COATED, EXTENDED RELEASE ORAL DAILY
Qty: 30 CAP | Refills: 11 | Status: SHIPPED | OUTPATIENT
Start: 2017-03-17 | End: 2017-05-23 | Stop reason: DRUGHIGH

## 2017-03-17 NOTE — TELEPHONE ENCOUNTER
Pt states that Dr Veronica Banda which his blood pressure medication and he would like to know which one he have to take.

## 2017-03-17 NOTE — PROGRESS NOTES
Chief Complaint   Patient presents with    Dizziness     F/U for dizziness.  Swelling     Pt has sharmaine swelling in ankles.

## 2017-03-17 NOTE — MR AVS SNAPSHOT
Visit Information Date & Time Provider Department Dept. Phone Encounter #  
 3/17/2017 11:45 AM Kristie MathewNate [phone] [id number] Follow-up Instructions Return in about 2 weeks (around 3/31/2017). Your Appointments 5/5/2017 10:00 AM  
ROUTINE CARE with Kristie Mathew MD  
[facility] [address]) Appt Note: 4 month follow up  
 [address] [facility] [place]  [address] [phone] [address] [facility] [address] Upcoming Health Maintenance Date Due ZOSTER VACCINE AGE 60> 11/8/2012 COLONOSCOPY 2/17/2024 DTaP/Tdap/Td series (2 - Td) 7/8/2026 Allergies as of 3/17/2017  Review Complete On: 2/13/2017 By: Kristie Mathew MD  
  
 Severity Noted Reaction Type Reactions Diclofenac  08/11/2010    Other (comments) Ringing in ears Propoxyphene-acetaminophen  08/11/2010    Rash  
 Sulfa (Sulfonamide Antibiotics)  08/11/2010    Shortness of Breath Pt states he is not allergic. Zithromax [Azithromycin]  08/08/2014    Rash Current Immunizations  Reviewed on 10/20/2014 Name Date Influenza Vaccine 10/20/2014 Influenza Vaccine PF 1/3/2014 Not reviewed this visit You Were Diagnosed With   
  
 Codes Comments Essential hypertension, benign    -  Primary ICD-10-CM: I10 
ICD-9-CM: 401.1 Glaucoma of both eyes, unspecified glaucoma     ICD-10-CM: H40.9 ICD-9-CM: 365.9 Gastroesophageal reflux disease with esophagitis     ICD-10-CM: K21.0 ICD-9-CM: 530.11 Vitals BP Pulse Temp Resp Height(growth percentile) Weight(growth percentile) 134/82 (BP 1 Location: Right arm, BP Patient Position: Sitting) 89 97.6 °F (36.4 °C) (Oral) 28 6' 1\" (1.854 m) 251 lb 6.4 oz (114 kg) SpO2 BMI Smoking Status 98% 33.17 kg/m2 Never Smoker BMI and BSA Data Body Mass Index Body Surface Area 33.17 kg/m 2 2.42 m 2 Preferred Pharmacy Pharmacy Name Phone [facility]/PHARMACY #[id number]- [place], [place] - [address] [facility] AT [address] [place] [address] [phone] Your Updated Medication List  
  
   
This list is accurate as of: 3/17/17 12:47 PM.  Always use your most recent med list.  
  
  
  
  
 albuterol 90 mcg/actuation inhaler Commonly known as:  PROAIR HFA Take 2 Puffs by inhalation every six (6) hours as needed for Wheezing. ALPHAGAN P 0.15 % ophthalmic solution Generic drug:  brimonidine Administer 1 Drop to both eyes three (3) times daily. ALREX 0.2 % Drps Generic drug:  loteprednol etabonate Administer 1 Drop to both eyes four (4) times daily. aspirin 81 mg tablet Take 81 mg by mouth. azelastine 137 mcg (0.1 %) nasal spray Commonly known as:  ASTELIN  
1 Spray by Both Nostrils route two (2) times a day. Use in each nostril as directed AZOPT 1 % ophthalmic suspension Generic drug:  brinzolamide BETIMOL 0.5 % ophthalmic solution Generic drug:  timolol Administer 1 Drop to both eyes two (2) times a day. use in affected eye(s)  
  
 dilTIAZem  mg ER capsule Commonly known as:  CARDIZEM CD Take 1 Cap by mouth daily. fluticasone 50 mcg/actuation nasal spray Commonly known as:  Montine [place] 2 Sprays by Both Nostrils route daily for 360 days. furosemide 20 mg tablet Commonly known as:  LASIX Take 1 Tab by mouth daily. ibuprofen 800 mg tablet Commonly known as:  MOTRIN  
TAKE 1 TABLET BY MOUTH EVERY 8 HOURS AS NEEDED FOR PAIN  
  
 indomethacin 50 mg capsule Commonly known as:  INDOCIN  
TAKE 1 CAPSULE TWICE A DAY  
  
 LUMIGAN 0.01 % ophthalmic drops Generic drug:  bimatoprost  
  
 meclizine 12.5 mg tablet Commonly known as:  ANTIVERT Take  by mouth three (3) times daily as needed. methazolAMIDE 50 mg tablet Commonly known as:  NEPTAZANE NASAL SPRAY (OXYMETAZOLINE) 0.05 % nasal spray Generic drug:  oxymetazoline INHALE 2 SPRAYS IN EACH OSTRIL EVERY 4 HOURS FOR 10 DAYS  
  
 prednisoLONE acetate 1 % ophthalmic suspension Commonly known as:  PRED FORTE INSTILL 1 DROP INTO RIGHT EYE 4 TIMES A DAY  
  
 ramipril 10 mg capsule Commonly known as:  ALTACE  
TAKE 2 CAPSULES BY MOUTH EVERY DAY  
  
 SALINE NASAL 0.65 % nasal spray Generic drug:  sodium chloride INHALE 2 SPRAYS IN EACH NOSTRIL 4 TIMES A DAY FOR 14 DAYS  
  
 sodium chloride irrigation 0.9 % irrigation IRRIGATE EACH NASAL CAVITY WITH 60CCS OF NORMAL SALINE SOLUTION DAILY. DISP: 1 LITER Prescriptions Sent to Pharmacy Refills  
 dilTIAZem CD (CARDIZEM CD) 180 mg ER capsule 11 Sig: Take 1 Cap by mouth daily. Class: Normal  
 Pharmacy: [facility]/pharmacy #[id number] [id number] [facility] AT [address] [place] [address] Ph #: [phone] Route: Oral  
 furosemide (LASIX) 20 mg tablet 11 Sig: Take 1 Tab by mouth daily. Class: Normal  
 Pharmacy: [facility]/pharmacy #[facility] AT [address] [place] [address] Ph #: [phone] Route: Oral  
  
Follow-up Instructions Return in about 2 weeks (around 3/31/2017). Introducing [facility] & HEALTH SERVICES! Kimberly Dale introduces [employer] patient portal. Now you can access parts of your medical record, email your doctor's office, and request medication refills online. 1. In your internet browser, go to [email or web address] 2. Click on the First Time User? Click Here link in the Sign In box. You will see the New Member Sign Up page. 3. Enter your [employer] Access Code exactly as it appears below. You will not need to use this code after youve completed the sign-up process. If you do not sign up before the expiration date, you must request a new code. · [employer] Access Code: [email or web address] Expires: 4/5/2017 11:19 AM 
 
 4. Enter the last four digits of your Social Security Number (xxxx) and Date of Birth (mm/dd/yyyy) as indicated and click Submit. You will be taken to the next sign-up page. 5. Create a [employer] ID. This will be your [employer] login ID and cannot be changed, so think of one that is secure and easy to remember. 6. Create a [employer] password. You can change your password at any time. 7. Enter your Password Reset Question and Answer. This can be used at a later time if you forget your password. 8. Enter your e-mail address. You will receive e-mail notification when new information is available in [address] 9. Click Sign Up. You can now view and download portions of your medical record. 10. Click the Download Summary menu link to download a portable copy of your medical information. If you have questions, please visit the Frequently Asked Questions section of the [email or web address] website. Remember, [employer] is NOT to be used for urgent needs. For medical emergencies, dial 911. Now available from your iPhone and Android! Please provide this summary of care documentation to your next provider. Your primary care clinician is listed as Kim Sanabria. If you have any questions after today's visit, please call [phone].

## 2017-03-18 NOTE — PROGRESS NOTES
HISTORY OF PRESENT ILLNESS  Jose Hirsch is a 61 y.o. male. f/u bilateral leg swelling,L >Rt,seen in ER negative labs,venous dopplers,denies sx of chf.Stable dizziness c/o  Dizziness    The history is provided by the patient. This is a recurrent problem. The problem occurs daily. The problem has not changed since onset. Associated symptoms include dizziness. Pertinent negatives include no chest pain, no palpitations, no fever and no abdominal pain. Swelling   This is a new problem. The current episode started more than 1 week ago. The problem occurs daily. The problem has not changed since onset. Pertinent negatives include no chest pain, no abdominal pain and no shortness of breath. Review of Systems   Constitutional: Negative for fever. Respiratory: Negative for shortness of breath. Cardiovascular: Positive for leg swelling. Negative for chest pain, palpitations and orthopnea. Gastrointestinal: Negative for abdominal pain. Skin: Negative for rash. Neurological: Positive for dizziness. Physical Exam   Constitutional: He appears well-developed and well-nourished. HENT:   Head: Normocephalic and atraumatic. Right Ear: External ear normal.   Left Ear: External ear normal.   Nose: Nose normal.   Mouth/Throat: Oropharynx is clear and moist.   Cardiovascular: Normal rate and regular rhythm. Exam reveals no gallop. No murmur heard. Pulmonary/Chest: Effort normal and breath sounds normal.   Abdominal: Soft. Bowel sounds are normal.   Skin: Skin is warm and dry. ASSESSMENT and PLAN  Suri Vazquez was seen today for dizziness and swelling. Diagnoses and all orders for this visit:    Bilateral leg edema,neg ative dopplers,no sx of chf.Will d/c amlodipine and start diltiazem,lasx    Essential hypertension, benign  -     dilTIAZem CD (CARDIZEM CD) 180 mg ER capsule; Take 1 Cap by mouth daily. -     furosemide (LASIX) 20 mg tablet; Take 1 Tab by mouth daily.     Glaucoma of both eyes, unspecified glaucoma    Gastroesophageal reflux disease with esophagitis      Follow-up Disposition:  Return in about 2 weeks (around 3/31/2017).

## 2017-04-04 ENCOUNTER — PATIENT OUTREACH (OUTPATIENT)
Dept: FAMILY MEDICINE CLINIC | Age: 67
End: 2017-04-04

## 2017-04-04 RX ORDER — ATORVASTATIN CALCIUM 80 MG/1
80 TABLET, FILM COATED ORAL DAILY
COMMUNITY
End: 2017-07-06 | Stop reason: SDUPTHER

## 2017-04-04 RX ORDER — MINERAL OIL
ENEMA (ML) RECTAL
COMMUNITY

## 2017-04-04 RX ORDER — BISMUTH SUBSALICYLATE 262 MG
1 TABLET,CHEWABLE ORAL DAILY
COMMUNITY

## 2017-04-04 NOTE — PROGRESS NOTES
Pat Ricci is a 61 y.o. 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 Problem 2: Chronic Sinusitis- started on Allerga     Problem 3: Glaucoma- unable to take steroids for long periods of time due to glaucoma, on many eye gtts. Patient's challenges to self management identified:lack of knowledge about disease,  Patients motivational level on a scale of 0-10: 10  Medication Management:  good adherence, good understanding. Advance Care Planning:   Patient was offered the opportunity to discuss advance care planning:  no     Does patient have an Advance Directive:  no   If no, did you provide information on Advance Care Planning?  no     [employer], Referrals, and Durable Medical Equipment: None    Follow up appointments:  Tomorrow with his regular eye Dr Rom Awad   4/10 at 8:30am with Dr Gerson Ahmadi  Patient calling [address] [facility] at [phone] to make Neuro surgery and Neuro Opthalmology appt him self. Goals      Attends follow-up appointments as directed.  Knowledge and adherence of prescribed medication (ie. action, side effects, missed dose, etc.).  Prevent complications post hospitalization.  Understands red flags post discharge. Patient verbalized understanding of all information discussed. Patient has this Nurse Navigators contact information for any further questions, concerns, or needs. Red Flag Warning- fever, H/A, return of vision loss.

## 2017-04-10 ENCOUNTER — OFFICE VISIT (OUTPATIENT)
Dept: FAMILY MEDICINE CLINIC | Age: 67
End: 2017-04-10

## 2017-04-10 VITALS
WEIGHT: 254.4 LBS | TEMPERATURE: 97.6 F | SYSTOLIC BLOOD PRESSURE: 132 MMHG | OXYGEN SATURATION: 97 % | BODY MASS INDEX: 33.72 KG/M2 | HEIGHT: 73 IN | DIASTOLIC BLOOD PRESSURE: 78 MMHG | RESPIRATION RATE: 26 BRPM | HEART RATE: 68 BPM

## 2017-04-10 DIAGNOSIS — J32.0 CHRONIC MAXILLARY SINUSITIS: ICD-10-CM

## 2017-04-10 DIAGNOSIS — I10 ESSENTIAL HYPERTENSION, BENIGN: Primary | ICD-10-CM

## 2017-04-10 DIAGNOSIS — R60.0 BILATERAL EDEMA OF LOWER EXTREMITY: ICD-10-CM

## 2017-04-10 RX ORDER — GUAIFENESIN 100 MG/5ML
LIQUID (ML) ORAL
Refills: 1 | COMMUNITY
Start: 2017-04-03 | End: 2017-04-10 | Stop reason: SDUPTHER

## 2017-04-10 RX ORDER — AMOXICILLIN AND CLAVULANATE POTASSIUM 875; 125 MG/1; MG/1
TABLET, FILM COATED ORAL
COMMUNITY
Start: 2017-04-08 | End: 2017-05-23 | Stop reason: ALTCHOICE

## 2017-04-10 RX ORDER — PREDNISONE 20 MG/1
TABLET ORAL
COMMUNITY
Start: 2017-04-08 | End: 2017-05-23 | Stop reason: ALTCHOICE

## 2017-04-10 RX ORDER — OXYCODONE HYDROCHLORIDE 5 MG/1
TABLET ORAL
COMMUNITY
Start: 2017-04-08 | End: 2017-06-28 | Stop reason: ALTCHOICE

## 2017-04-10 NOTE — PROGRESS NOTES
HISTORY OF PRESENT ILLNESS  Enrike Daugherty is a 61 y.o. male. f/u hospitalization at [facility] for transient vision loss rt eye ,rekated to chronic sinusitis. Pt underwent sinus surgery and sx resolved. F/U HBP ,chronic bilateral; leg edema,resolved with changing amlodipine to diltiazem. Chronic dizziness has improved. Feeling well,using sinus irrigation and unidentified nasal spray from U  Hypertension    The history is provided by the patient. This is a chronic problem. The problem has been gradually improving. Pertinent negatives include no chest pain, no malaise/fatigue, no blurred vision, no headaches, no peripheral edema and no dizziness. Cholesterol Problem   Pertinent negatives include no chest pain and no headaches. Leg Swelling   The history is provided by the patient. This is a chronic problem. The problem occurs daily. The problem has been resolved. Pertinent negatives include no chest pain and no headaches. Loss of Vision    This is a new problem. The problem occurs rarely. The problem has been resolved. Pertinent negatives include no blurred vision, no double vision, no fever and no dizziness. Review of Systems   Constitutional: Negative for fever, malaise/fatigue and weight loss. Eyes: Negative for blurred vision and double vision. Cardiovascular: Negative for chest pain. Genitourinary: Negative for frequency. Neurological: Negative for dizziness and headaches. Physical Exam   Constitutional: He is oriented to person, place, and time. He appears well-developed and well-nourished. HENT:   Head: Normocephalic and atraumatic. Right Ear: External ear normal.   Left Ear: External ear normal.   Nose: Nose normal.   Mouth/Throat: Oropharynx is clear and moist.   Cardiovascular: Normal rate. Pulmonary/Chest: Effort normal and breath sounds normal.   Abdominal: Soft. Bowel sounds are normal.   Neurological: He is alert and oriented to person, place, and time. No cranial nerve deficit. Skin: Skin is warm and dry. ASSESSMENT and PLAN  nAh Dooley was seen today for hypertension and cholesterol problem. Diagnoses and all orders for this visit:    Essential hypertension, benign  -     Cancel: METABOLIC PANEL, BASIC    Bilateral edema of lower extremity  -     Cancel: METABOLIC PANEL, BASIC    Chronic maxillary sinusitis,treated surgically at [facility] with good results      Follow-up Disposition:  Return in about 3 months (around 7/10/2017).

## 2017-04-10 NOTE — MR AVS SNAPSHOT
Visit Information Date & Time Provider Department Dept. Phone Encounter #  
 4/10/2017  8:30 AM Claude EscobedoNate [phone] [id number] Follow-up Instructions Return in about 3 months (around 7/10/2017). Your Appointments 5/5/2017 10:00 AM  
ROUTINE CARE with Claude Escobedo MD  
[facility]) Appt Note: 4 month follow up  
 [address] [facility] [place]  [address] [phone] [address] [facility] [address] Upcoming Health Maintenance Date Due COLONOSCOPY 2/17/2024 DTaP/Tdap/Td series (2 - Td) 7/8/2026 Allergies as of 4/10/2017  Review Complete On: 4/10/2017 By: Myriam Ruby Severity Noted Reaction Type Reactions Diclofenac  08/11/2010    Other (comments) Ringing in ears Propoxyphene-acetaminophen  08/11/2010    Rash  
 Sulfa (Sulfonamide Antibiotics)  08/11/2010    Shortness of Breath Pt states he is not allergic. Zithromax [Azithromycin]  08/08/2014    Rash Current Immunizations  Reviewed on 10/20/2014 Name Date Influenza Vaccine 10/20/2014 Influenza Vaccine PF 1/3/2014 Not reviewed this visit You Were Diagnosed With   
  
 Codes Comments Essential hypertension, benign    -  Primary ICD-10-CM: I10 
ICD-9-CM: 401.1 Bilateral edema of lower extremity     ICD-10-CM: R60.0 ICD-9-CM: 906. 3 Chronic maxillary sinusitis     ICD-10-CM: J32.0 ICD-9-CM: 473.0 Vitals BP Pulse Temp Resp Height(growth percentile) Weight(growth percentile) 132/78 (BP 1 Location: Left arm, BP Patient Position: Sitting) 68 97.6 °F (36.4 °C) (Oral) 26 6' 1\" (1.854 m) 254 lb 6.4 oz (115.4 kg) SpO2 BMI Smoking Status 97% 33.56 kg/m2 Never Smoker Vitals History BMI and BSA Data Body Mass Index Body Surface Area  
 33.56 kg/m 2 2.44 m 2 Preferred Pharmacy Pharmacy Name Phone [facility]/PHARMACY #[id number]- [place], [place] - [address] [facility] AT [address] [facility] [phone] Your Updated Medication List  
  
   
This list is accurate as of: 4/10/17  9:01 AM.  Always use your most recent med list.  
  
  
  
  
 albuterol 90 mcg/actuation inhaler Commonly known as:  PROAIR HFA Take 2 Puffs by inhalation every six (6) hours as needed for Wheezing. ALPHAGAN P 0.15 % ophthalmic solution Generic drug:  brimonidine Administer 1 Drop to both eyes three (3) times daily. ALREX 0.2 % Drps Generic drug:  loteprednol etabonate Administer 1 Drop to both eyes four (4) times daily. amoxicillin-clavulanate 875-125 mg per tablet Commonly known as:  AUGMENTIN  
  
 aspirin 81 mg tablet Take 81 mg by mouth. atorvastatin 80 mg tablet Commonly known as:  LIPITOR Take 80 mg by mouth daily. azelastine 137 mcg (0.1 %) nasal spray Commonly known as:  ASTELIN  
1 Spray by Both Nostrils route two (2) times a day. Use in each nostril as directed AZOPT 1 % ophthalmic suspension Generic drug:  brinzolamide BETIMOL 0.5 % ophthalmic solution Generic drug:  timolol Administer 1 Drop to both eyes two (2) times a day. use in affected eye(s)  
  
 dilTIAZem  mg ER capsule Commonly known as:  CARDIZEM CD Take 1 Cap by mouth daily. fexofenadine 180 mg tablet Commonly known as:  Trey Leash Take  by mouth. furosemide 20 mg tablet Commonly known as:  LASIX Take 1 Tab by mouth daily. ibuprofen 800 mg tablet Commonly known as:  MOTRIN  
TAKE 1 TABLET BY MOUTH EVERY 8 HOURS AS NEEDED FOR PAIN  
  
 indomethacin 50 mg capsule Commonly known as:  INDOCIN  
TAKE 1 CAPSULE TWICE A DAY  
  
 LUMIGAN 0.01 % ophthalmic drops Generic drug:  bimatoprost  
  
 meclizine 12.5 mg tablet Commonly known as:  ANTIVERT Take  by mouth three (3) times daily as needed. methazolAMIDE 50 mg tablet Commonly known as:  NEPTAZANE  
  
 multivitamin tablet Commonly known as:  ONE A DAY Take 1 Tab by mouth daily. NASAL SPRAY (OXYMETAZOLINE) 0.05 % nasal spray Generic drug:  oxymetazoline INHALE 2 SPRAYS IN EACH OSTRIL EVERY 4 HOURS FOR 10 DAYS  
  
 oxyCODONE IR 5 mg immediate release tablet Commonly known as:  ROXICODONE  
  
 prednisoLONE acetate 1 % ophthalmic suspension Commonly known as:  PRED FORTE INSTILL 1 DROP INTO RIGHT EYE 4 TIMES A DAY  
  
 predniSONE 20 mg tablet Commonly known as:  DELTASONE  
  
 ramipril 10 mg capsule Commonly known as:  ALTACE  
TAKE 2 CAPSULES BY MOUTH EVERY DAY  
  
 SALINE NASAL 0.65 % nasal spray Generic drug:  sodium chloride INHALE 2 SPRAYS IN EACH NOSTRIL 4 TIMES A DAY FOR 14 DAYS  
  
 sodium chloride irrigation 0.9 % irrigation IRRIGATE EACH NASAL CAVITY WITH 60CCS OF NORMAL SALINE SOLUTION DAILY. DISP: 1 LITER Follow-up Instructions Return in about 3 months (around 7/10/2017). Introducing \[facility]\"" & HEALTH SERVICES! [facility] introduces Ponfac patient portal. Now you can access parts of your medical record, email your doctor's office, and request medication refills online. 1. In your internet browser, go to [email or web address] 2. Click on the First Time User? Click Here link in the Sign In box. You will see the New Member Sign Up page. 3. Enter your Ponfac Access Code exactly as it appears below. You will not need to use this code after youve completed the sign-up process. If you do not sign up before the expiration date, you must request a new code. · Ponfac Access Code: [email or web address] Expires: 7/9/2017  9:01 AM 
 
4. Enter the last four digits of your Social Security Number (xxxx) and Date of Birth (mm/dd/yyyy) as indicated and click Submit. You will be taken to the next sign-up page. 5. Create a Ponfac ID.  This will be your Ponfac login ID and cannot be changed, so think of one that is secure and easy to remember. 6. Create a Cytomedix password. You can change your password at any time. 7. Enter your Password Reset Question and Answer. This can be used at a later time if you forget your password. 8. Enter your e-mail address. You will receive e-mail notification when new information is available in [address] 9. Click Sign Up. You can now view and download portions of your medical record. 10. Click the Download Summary menu link to download a portable copy of your medical information. If you have questions, please visit the Frequently Asked Questions section of the Cytomedix website. Remember, Cytomedix is NOT to be used for urgent needs. For medical emergencies, dial 911. Now available from your iPhone and Android! Please provide this summary of care documentation to your next provider. Your primary care clinician is listed as [facility]. If you have any questions after today's visit, please call [phone].

## 2017-05-05 ENCOUNTER — PATIENT OUTREACH (OUTPATIENT)
Dept: FAMILY MEDICINE CLINIC | Age: 67
End: 2017-05-05

## 2017-05-14 RX ORDER — AMLODIPINE BESYLATE 10 MG/1
TABLET ORAL
Qty: 30 TAB | Refills: 11 | Status: SHIPPED | OUTPATIENT
Start: 2017-05-14 | End: 2017-05-23 | Stop reason: ALTCHOICE

## 2017-05-23 ENCOUNTER — OFFICE VISIT (OUTPATIENT)
Dept: FAMILY MEDICINE CLINIC | Age: 67
End: 2017-05-23

## 2017-05-23 VITALS
HEART RATE: 73 BPM | TEMPERATURE: 98.5 F | OXYGEN SATURATION: 96 % | SYSTOLIC BLOOD PRESSURE: 138 MMHG | WEIGHT: 249.2 LBS | BODY MASS INDEX: 33.03 KG/M2 | RESPIRATION RATE: 26 BRPM | HEIGHT: 73 IN | DIASTOLIC BLOOD PRESSURE: 84 MMHG

## 2017-05-23 DIAGNOSIS — I10 ESSENTIAL HYPERTENSION, BENIGN: ICD-10-CM

## 2017-05-23 DIAGNOSIS — R42 DIZZINESS: Primary | ICD-10-CM

## 2017-05-23 RX ORDER — DILTIAZEM HYDROCHLORIDE 240 MG/1
240 CAPSULE, COATED, EXTENDED RELEASE ORAL DAILY
Qty: 30 CAP | Refills: 11 | Status: SHIPPED | OUTPATIENT
Start: 2017-05-23 | End: 2018-05-10 | Stop reason: SDUPTHER

## 2017-05-23 NOTE — MR AVS SNAPSHOT
Visit Information Date & Time Provider Department Dept. Phone Encounter #  
 5/23/2017  2:45 PM Rema Villa Nate Vogt [phone] [id number] Follow-up Instructions Return in about 4 weeks (around 6/20/2017). Your Appointments 7/10/2017 11:00 AM  
ROUTINE CARE with Rema Villa MD  
[facility] [address]) Appt Note: routine follow up  
 [address] [facility] [address] [phone] [address] [place] [address] [address] Upcoming Health Maintenance Date Due INFLUENZA AGE 9 TO ADULT 8/1/2017 COLONOSCOPY 2/17/2024 DTaP/Tdap/Td series (2 - Td) 7/8/2026 Allergies as of 5/23/2017  Review Complete On: 5/23/2017 By: Brenden Hobson Severity Noted Reaction Type Reactions Diclofenac  08/11/2010    Other (comments) Ringing in ears Propoxyphene-acetaminophen  08/11/2010    Rash  
 Sulfa (Sulfonamide Antibiotics)  08/11/2010    Shortness of Breath Pt states he is not allergic. Zithromax [Azithromycin]  08/08/2014    Rash Current Immunizations  Reviewed on 10/20/2014 Name Date Influenza Vaccine 10/20/2014 Influenza Vaccine PF 1/3/2014 Not reviewed this visit You Were Diagnosed With   
  
 Codes Comments Dizziness    -  Primary ICD-10-CM: L20 ICD-9-CM: 780.4 Essential hypertension, benign     ICD-10-CM: I10 
ICD-9-CM: 401.1 Vitals BP Pulse Temp Resp Height(growth percentile) Weight(growth percentile) 138/84 (BP 1 Location: Left arm, BP Patient Position: Sitting) 73 98.5 °F (36.9 °C) (Oral) 26 6' 1\" (1.854 m) 249 lb 3.2 oz (113 kg) SpO2 BMI Smoking Status 96% 32.88 kg/m2 Never Smoker Vitals History BMI and BSA Data Body Mass Index Body Surface Area  
 32.88 kg/m 2 2.41 m 2 Preferred Pharmacy Pharmacy Name Phone [facility]/PHARMACY #[id number] - [facility] [facility] AT [address] [facility] [phone] Your Updated Medication List  
  
   
This list is accurate as of: 5/23/17  3:11 PM.  Always use your most recent med list.  
  
  
  
  
 albuterol 90 mcg/actuation inhaler Commonly known as:  PROAIR HFA Take 2 Puffs by inhalation every six (6) hours as needed for Wheezing. ALPHAGAN P 0.15 % ophthalmic solution Generic drug:  brimonidine Administer 1 Drop to both eyes three (3) times daily. ALREX 0.2 % Drps Generic drug:  loteprednol etabonate Administer 1 Drop to both eyes four (4) times daily. aspirin 81 mg tablet Take 81 mg by mouth. atorvastatin 80 mg tablet Commonly known as:  LIPITOR Take 80 mg by mouth daily. azelastine 137 mcg (0.1 %) nasal spray Commonly known as:  ASTELIN  
1 Spray by Both Nostrils route two (2) times a day. Use in each nostril as directed AZOPT 1 % ophthalmic suspension Generic drug:  brinzolamide BETIMOL 0.5 % ophthalmic solution Generic drug:  timolol Administer 1 Drop to both eyes two (2) times a day. use in affected eye(s)  
  
 dilTIAZem  mg ER capsule Commonly known as:  CARDIZEM CD Take 1 Cap by mouth daily. fexofenadine 180 mg tablet Commonly known as:  Tim Sonya Take  by mouth. furosemide 20 mg tablet Commonly known as:  LASIX Take 1 Tab by mouth daily. ibuprofen 800 mg tablet Commonly known as:  MOTRIN  
TAKE 1 TABLET BY MOUTH EVERY 8 HOURS AS NEEDED FOR PAIN  
  
 indomethacin 50 mg capsule Commonly known as:  INDOCIN  
TAKE 1 CAPSULE TWICE A DAY  
  
 LUMIGAN 0.01 % ophthalmic drops Generic drug:  bimatoprost  
  
 meclizine 12.5 mg tablet Commonly known as:  ANTIVERT Take  by mouth three (3) times daily as needed. methazolAMIDE 50 mg tablet Commonly known as:  NEPTAZANE  
  
 multivitamin tablet Commonly known as:  ONE A DAY Take 1 Tab by mouth daily. NASAL SPRAY (OXYMETAZOLINE) 0.05 % nasal spray Generic drug:  oxymetazoline INHALE 2 SPRAYS IN EACH OSTRIL EVERY 4 HOURS FOR 10 DAYS  
  
 oxyCODONE IR 5 mg immediate release tablet Commonly known as:  ROXICODONE  
  
 prednisoLONE acetate 1 % ophthalmic suspension Commonly known as:  PRED FORTE INSTILL 1 DROP INTO RIGHT EYE 4 TIMES A DAY  
  
 ramipril 10 mg capsule Commonly known as:  ALTACE  
TAKE 2 CAPSULES BY MOUTH EVERY DAY  
  
 SALINE NASAL 0.65 % nasal spray Generic drug:  sodium chloride INHALE 2 SPRAYS IN EACH NOSTRIL 4 TIMES A DAY FOR 14 DAYS  
  
 sodium chloride irrigation 0.9 % irrigation IRRIGATE EACH NASAL CAVITY WITH 60CCS OF NORMAL SALINE SOLUTION DAILY. DISP: 1 LITER Prescriptions Sent to Pharmacy Refills  
 dilTIAZem CD (CARDIZEM CD) 240 mg ER capsule 11 Sig: Take 1 Cap by mouth daily. Class: Normal  
 Pharmacy: [facility]/pharmacy #[id number] [place] [address] AT [address] [place] [address] #: [phone] Route: Oral  
  
Follow-up Instructions Return in about 4 weeks (around 6/20/2017). Introducing [facility] & HEALTH SERVICES! [facility] introduces Maker[employer] patient portal. Now you can access parts of your medical record, email your doctor's office, and request medication refills online. 1. In your internet browser, go to [email or web address] 2. Click on the First Time User? Click Here link in the Sign In box. You will see the New Member Sign Up page. 3. Enter your MakerBot Access Code exactly as it appears below. You will not need to use this code after youve completed the sign-up process. If you do not sign up before the expiration date, you must request a new code. · MakerBot Access Code: [email or web address] Expires: 7/9/2017  9:01 AM 
 
4. Enter the last four digits of your Social Security Number (xxxx) and Date of Birth (mm/dd/yyyy) as indicated and click Submit. You will be taken to the next sign-up page. 5. Create a LoopIt ID. This will be your LoopIt login ID and cannot be changed, so think of one that is secure and easy to remember. 6. Create a LoopIt password. You can change your password at any time. 7. Enter your Password Reset Question and Answer. This can be used at a later time if you forget your password. 8. Enter your e-mail address. You will receive e-mail notification when new information is available in [address] 9. Click Sign Up. You can now view and download portions of your medical record. 10. Click the Download Summary menu link to download a portable copy of your medical information. If you have questions, please visit the Frequently Asked Questions section of the LoopIt website. Remember, LoopIt is NOT to be used for urgent needs. For medical emergencies, dial 911. Now available from your iPhone and Android! Please provide this summary of care documentation to your next provider. Your primary care clinician is listed as Roxann Gotti. If you have any questions after today's visit, please call [phone].

## 2017-05-23 NOTE — PROGRESS NOTES
HISTORY OF PRESENT ILLNESS  Yohana Tejeda is a 61 y.o. male. f/u bilateral leg swelling,L >Rt,seen in ER negative labs,venous dopplers,denies sx of chf.Stable dizziness c/o  Dizziness    The history is provided by the patient. This is a recurrent problem. The problem occurs daily. The problem has not changed since onset. Associated symptoms include dizziness. Pertinent negatives include no chest pain, no palpitations, no fever and no abdominal pain. Swelling   This is a new problem. The current episode started more than 1 week ago. The problem occurs daily. The problem has not changed since onset. Pertinent negatives include no chest pain, no abdominal pain and no shortness of breath. Review of Systems   Constitutional: Negative for fever. Respiratory: Negative for shortness of breath. Cardiovascular: Positive for leg swelling. Negative for chest pain, palpitations and orthopnea. Gastrointestinal: Negative for abdominal pain. Skin: Negative for rash. Neurological: Positive for dizziness. Physical Exam   Constitutional: He appears well-developed and well-nourished. HENT:   Head: Normocephalic and atraumatic. Right Ear: External ear normal.   Left Ear: External ear normal.   Nose: Nose normal.   Mouth/Throat: Oropharynx is clear and moist.   Cardiovascular: Normal rate and regular rhythm. Exam reveals no gallop. No murmur heard. Pulmonary/Chest: Effort normal and breath sounds normal.   Abdominal: Soft. Bowel sounds are normal.   Skin: Skin is warm and dry. ASSESSMENT and PLAN  Sherrilee Spurling was seen today for dizziness and swelling. Diagnoses and all orders for this visit:Dizziness briefly on standing    Bilateral leg edema,neg ative dopplers,no sx of chf.Will d/c amlodipine and start diltiazem,lasx    Essential hypertension, benign  -     dilTIAZem CD (CARDIZEM CD) 180 mg ER capsule; Take 1 Cap by mouth daily. -     furosemide (LASIX) 20 mg tablet;  Take 1 Tab by mouth daily.    Glaucoma of both eyes, unspecified glaucoma    Gastroesophageal reflux disease with esophagitis      Follow-up Disposition:  Return in about 4 weeks (around 6/20/2017).

## 2017-05-25 RX ORDER — RAMIPRIL 10 MG/1
CAPSULE ORAL
Qty: 60 CAP | Refills: 10 | Status: SHIPPED | OUTPATIENT
Start: 2017-05-25 | End: 2018-04-09 | Stop reason: SDUPTHER

## 2017-06-28 ENCOUNTER — OFFICE VISIT (OUTPATIENT)
Dept: FAMILY MEDICINE CLINIC | Age: 67
End: 2017-06-28

## 2017-06-28 VITALS
DIASTOLIC BLOOD PRESSURE: 74 MMHG | HEIGHT: 73 IN | RESPIRATION RATE: 18 BRPM | HEART RATE: 74 BPM | OXYGEN SATURATION: 98 % | BODY MASS INDEX: 33.27 KG/M2 | WEIGHT: 251 LBS | TEMPERATURE: 98.5 F | SYSTOLIC BLOOD PRESSURE: 128 MMHG

## 2017-06-28 DIAGNOSIS — R42 DIZZINESS: ICD-10-CM

## 2017-06-28 DIAGNOSIS — J30.89 NON-SEASONAL ALLERGIC RHINITIS, UNSPECIFIED ALLERGIC RHINITIS TRIGGER: ICD-10-CM

## 2017-06-28 DIAGNOSIS — I10 ESSENTIAL HYPERTENSION, BENIGN: Primary | ICD-10-CM

## 2017-06-28 NOTE — MR AVS SNAPSHOT
Visit Information Date & Time Provider Department Dept. Phone Encounter #  
 6/28/2017  3:45 PM Jaxson Seth [facility] [phone] [id number] Follow-up Instructions Return in about 3 months (around 9/28/2017). Your Appointments 7/10/2017 11:00 AM  
ROUTINE CARE with Jaxson Seth MD  
[facility]) Appt Note: routine follow up  
 [address] [facility] [id number] [phone] [address] [facility] [address] Upcoming Health Maintenance Date Due INFLUENZA AGE 9 TO ADULT 8/1/2017 COLONOSCOPY 2/17/2024 DTaP/Tdap/Td series (2 - Td) 7/8/2026 Allergies as of 6/28/2017  Review Complete On: 6/28/2017 By: Charanjit Graves LPN Severity Noted Reaction Type Reactions Diclofenac  08/11/2010    Other (comments) Ringing in ears Propoxyphene-acetaminophen  08/11/2010    Rash  
 Sulfa (Sulfonamide Antibiotics)  08/11/2010    Shortness of Breath Pt states he is not allergic. Zithromax [Azithromycin]  08/08/2014    Rash Current Immunizations  Reviewed on 10/20/2014 Name Date Influenza Vaccine 10/20/2014 Influenza Vaccine PF 1/3/2014 Not reviewed this visit You Were Diagnosed With   
  
 Codes Comments Essential hypertension, benign    -  Primary ICD-10-CM: I10 
ICD-9-CM: 401.1 Dizziness     ICD-10-CM: C97 ICD-9-CM: 780.4 Non-seasonal allergic rhinitis, unspecified allergic rhinitis trigger     ICD-10-CM: J30.89 ICD-9-CM: 477.8 Vitals BP Pulse Temp Resp Height(growth percentile) Weight(growth percentile) 128/74 74 98.5 °F (36.9 °C) (Oral) 18 6' 1\" (1.854 m) 251 lb (113.9 kg) SpO2 BMI Smoking Status 98% 33.12 kg/m2 Never Smoker Vitals History BMI and BSA Data Body Mass Index Body Surface Area  
 33.12 kg/m 2 2.42 m 2 Preferred Pharmacy Pharmacy Name Phone [facility]/PHARMACY #[id number]- [place], [place] - [address] AT [address] [facility] [phone] Your Updated Medication List  
  
   
This list is accurate as of: 6/28/17  4:02 PM.  Always use your most recent med list.  
  
  
  
  
 albuterol 90 mcg/actuation inhaler Commonly known as:  PROAIR HFA Take 2 Puffs by inhalation every six (6) hours as needed for Wheezing. ALPHAGAN P 0.15 % ophthalmic solution Generic drug:  brimonidine Administer 1 Drop to both eyes three (3) times daily. ALREX 0.2 % Drps Generic drug:  loteprednol etabonate Administer 1 Drop to both eyes four (4) times daily. aspirin 81 mg tablet Take 81 mg by mouth. atorvastatin 80 mg tablet Commonly known as:  LIPITOR Take 80 mg by mouth daily. AZOPT 1 % ophthalmic suspension Generic drug:  brinzolamide BETIMOL 0.5 % ophthalmic solution Generic drug:  timolol Administer 1 Drop to both eyes two (2) times a day. use in affected eye(s)  
  
 dilTIAZem  mg ER capsule Commonly known as:  CARDIZEM CD Take 1 Cap by mouth daily. fexofenadine 180 mg tablet Commonly known as:  Georgana Godwin Take  by mouth. ibuprofen 800 mg tablet Commonly known as:  MOTRIN  
TAKE 1 TABLET BY MOUTH EVERY 8 HOURS AS NEEDED FOR PAIN  
  
 LUMIGAN 0.01 % ophthalmic drops Generic drug:  bimatoprost  
  
 meclizine 12.5 mg tablet Commonly known as:  ANTIVERT Take  by mouth three (3) times daily as needed. methazolAMIDE 50 mg tablet Commonly known as:  NEPTAZANE  
  
 multivitamin tablet Commonly known as:  ONE A DAY Take 1 Tab by mouth daily. NASAL SPRAY (OXYMETAZOLINE) 0.05 % nasal spray Generic drug:  oxymetazoline INHALE 2 SPRAYS IN EACH OSTRIL EVERY 4 HOURS FOR 10 DAYS  
  
 prednisoLONE acetate 1 % ophthalmic suspension Commonly known as:  PRED FORTE INSTILL 1 DROP INTO RIGHT EYE 4 TIMES A DAY  
  
 ramipril 10 mg capsule Commonly known as:  ALTACE  
TAKE 2 CAPSULES BY MOUTH EVERY DAY  
  
 SALINE NASAL 0.65 % nasal spray Generic drug:  sodium chloride INHALE 2 SPRAYS IN EACH NOSTRIL 4 TIMES A DAY FOR 14 DAYS  
  
 sodium chloride irrigation 0.9 % irrigation IRRIGATE EACH NASAL CAVITY WITH 60CCS OF NORMAL SALINE SOLUTION DAILY. DISP: 1 LITER Follow-up Instructions Return in about 3 months (around 9/28/2017). Introducing [facility] & [facility] SERVICES! [place] [facility] introduces Palm patient portal. Now you can access parts of your medical record, email your doctor's office, and request medication refills online. 1. In your internet browser, go to [email or web address] 2. Click on the First Time User? Click Here link in the Sign In box. You will see the New Member Sign Up page. 3. Enter your Palm Access Code exactly as it appears below. You will not need to use this code after youve completed the sign-up process. If you do not sign up before the expiration date, you must request a new code. · Palm Access Code: [email or web address] Expires: 7/9/2017  9:01 AM 
 
4. Enter the last four digits of your Social Security Number (xxxx) and Date of Birth (mm/dd/yyyy) as indicated and click Submit. You will be taken to the next sign-up page. 5. Create a Palm ID. This will be your Palm login ID and cannot be changed, so think of one that is secure and easy to remember. 6. Create a Palm password. You can change your password at any time. 7. Enter your Password Reset Question and Answer. This can be used at a later time if you forget your password. 8. Enter your e-mail address. You will receive e-mail notification when new information is available in [address] 9. Click Sign Up. You can now view and download portions of your medical record. 10. Click the Download Summary menu link to download a portable copy of your medical information. If you have questions, please visit the Frequently Asked Questions section of the [employer] [facility] website. Remember, Democravise is NOT to be used for urgent needs. For medical emergencies, dial 911. Now available from your iPhone and Android! Please provide this summary of care documentation to your next provider. Your primary care clinician is listed as Ilsa Ojeda. If you have any questions after today's visit, please call [phone].

## 2017-06-28 NOTE — PROGRESS NOTES
HISTORY OF PRESENT ILLNESS  Yohana Tejeda is a 61 y.o. male. f/u hbp,dizziness leg edema. Doing some better,less edema,dizziness persists to see ENT this week  Hypertension    The history is provided by the patient. This is a chronic problem. The problem has not changed since onset. Associated symptoms include dizziness. Pertinent negatives include no chest pain, no orthopnea, no malaise/fatigue, no peripheral edema and no shortness of breath. Dizziness    This is a recurrent problem. The problem occurs daily. The problem has not changed since onset. The problem is associated with normal activity. Associated symptoms include dizziness. Pertinent negatives include no chest pain, no fever, no malaise/fatigue, no back pain, no focal weakness, no seizures and no slurred speech. Leg Swelling   This is a chronic problem. The problem occurs daily. The problem has been gradually improving. Pertinent negatives include no chest pain and no shortness of breath. Review of Systems   Constitutional: Negative for fever and malaise/fatigue. Respiratory: Negative for shortness of breath. Cardiovascular: Negative for chest pain, orthopnea and claudication. Genitourinary: Negative for frequency. Musculoskeletal: Negative for back pain. Neurological: Positive for dizziness. Negative for focal weakness and seizures. Physical Exam   Constitutional: He is oriented to person, place, and time. He appears well-developed and well-nourished. HENT:   Head: Normocephalic and atraumatic. Right Ear: External ear normal.   Left Ear: External ear normal.   Nose: Nose normal.   Mouth/Throat: Oropharynx is clear and moist.   Cardiovascular: Normal rate and regular rhythm. Pulmonary/Chest: Effort normal and breath sounds normal.   Abdominal: Soft. Neurological: He is alert and oriented to person, place, and time. Skin: Skin is warm and dry. ASSESSMENT and PLAN  Sherrilee Spurling was seen today for follow-up.     Diagnoses and all orders for this visit:    Essential hypertension, benign    Dizziness,to f/u with  ent    Non-seasonal allergic rhinitis, unspecified allergic rhinitis trigger    Continue current meds and treatments. Follow-up Disposition:  Return in about 3 months (around 9/28/2017).

## 2017-06-28 NOTE — PROGRESS NOTES
Visit Vitals    /74    Pulse 74    Temp 98.5 °F (36.9 °C) (Oral)    Resp 18    Ht 6' 1\" (1.854 m)    Wt 251 lb (113.9 kg)    SpO2 98%    BMI 33.12 kg/m2     Chief Complaint   Patient presents with    Follow-up

## 2017-07-10 ENCOUNTER — OFFICE VISIT (OUTPATIENT)
Dept: FAMILY MEDICINE CLINIC | Age: 67
End: 2017-07-10

## 2017-07-10 VITALS
WEIGHT: 252.4 LBS | DIASTOLIC BLOOD PRESSURE: 76 MMHG | BODY MASS INDEX: 33.45 KG/M2 | OXYGEN SATURATION: 97 % | RESPIRATION RATE: 22 BRPM | TEMPERATURE: 98.1 F | HEART RATE: 71 BPM | HEIGHT: 73 IN | SYSTOLIC BLOOD PRESSURE: 126 MMHG

## 2017-07-10 DIAGNOSIS — J32.0 CHRONIC MAXILLARY SINUSITIS: ICD-10-CM

## 2017-07-10 DIAGNOSIS — R35.1 NOCTURIA: ICD-10-CM

## 2017-07-10 DIAGNOSIS — I10 ESSENTIAL HYPERTENSION, BENIGN: Primary | ICD-10-CM

## 2017-07-10 DIAGNOSIS — R42 DIZZINESS: ICD-10-CM

## 2017-07-10 DIAGNOSIS — E78.2 MIXED HYPERLIPIDEMIA: ICD-10-CM

## 2017-07-10 DIAGNOSIS — K21.00 GASTROESOPHAGEAL REFLUX DISEASE WITH ESOPHAGITIS: ICD-10-CM

## 2017-07-10 LAB — GLUCOSE POC: 111 MG/DL

## 2017-07-10 RX ORDER — MECLIZINE HCL 12.5 MG 12.5 MG/1
12.5 TABLET ORAL
Qty: 30 TAB | Refills: 1 | Status: SHIPPED | OUTPATIENT
Start: 2017-07-10 | End: 2017-10-16 | Stop reason: SDUPTHER

## 2017-07-10 NOTE — MR AVS SNAPSHOT
Visit Information Date & Time Provider Department Dept. Phone Encounter #  
 7/10/2017 11:00 AM Gillian BerryNate [phone] [id number] Follow-up Instructions Return in about 3 months (around 10/10/2017). Your Appointments 9/27/2017  3:45 PM  
ROUTINE CARE with Gillian Berry MD  
[facility]) Appt Note: routine follow up  
 [address] [facility] [id number] [phone] [address] [facility] [address] Upcoming Health Maintenance Date Due INFLUENZA AGE 9 TO ADULT 8/1/2017 COLONOSCOPY 2/17/2024 DTaP/Tdap/Td series (2 - Td) 7/8/2026 Allergies as of 7/10/2017  Review Complete On: 7/10/2017 By: Chandrakant Kyle Severity Noted Reaction Type Reactions Diclofenac  08/11/2010    Other (comments) Ringing in ears Propoxyphene-acetaminophen  08/11/2010    Rash  
 Sulfa (Sulfonamide Antibiotics)  08/11/2010    Shortness of Breath Pt states he is not allergic. Zithromax [Azithromycin]  08/08/2014    Rash Current Immunizations  Reviewed on 10/20/2014 Name Date Influenza Vaccine 10/20/2014 Influenza Vaccine PF 1/3/2014 Not reviewed this visit You Were Diagnosed With   
  
 Codes Comments Essential hypertension, benign    -  Primary ICD-10-CM: I10 
ICD-9-CM: 869. 1 Chronic maxillary sinusitis     ICD-10-CM: J32.0 ICD-9-CM: 473.0 Mixed hyperlipidemia     ICD-10-CM: E78.2 ICD-9-CM: 272.2 Dizziness     ICD-10-CM: N35 ICD-9-CM: 780.4 Gastroesophageal reflux disease with esophagitis     ICD-10-CM: K21.0 ICD-9-CM: 530.11 Nocturia     ICD-10-CM: R35.1 ICD-9-CM: 788.43 Vitals BP Pulse Temp Resp Height(growth percentile) Weight(growth percentile)  126/76 (BP 1 Location: Left arm, BP Patient Position: Sitting) 71 98.1 °F (36.7 °C) (Oral) 22 6' 1\" (1.854 m) 252 lb 6.4 oz (114.5 kg) SpO2 BMI Smoking Status 97% 33.3 kg/m2 Never Smoker Vitals History BMI and BSA Data Body Mass Index Body Surface Area  
 33.3 kg/m 2 2.43 m 2 Preferred Pharmacy Pharmacy Name Phone [id number]- [facility] [address] [facility] AT [address] [place] [address] [phone] Your Updated Medication List  
  
   
This list is accurate as of: 7/10/17 11:33 AM.  Always use your most recent med list.  
  
  
  
  
 albuterol 90 mcg/actuation inhaler Commonly known as:  PROAIR HFA Take 2 Puffs by inhalation every six (6) hours as needed for Wheezing. ALPHAGAN P 0.15 % ophthalmic solution Generic drug:  brimonidine Administer 1 Drop to both eyes three (3) times daily. ALREX 0.2 % Drps Generic drug:  loteprednol etabonate Administer 1 Drop to both eyes four (4) times daily. aspirin 81 mg tablet Take 81 mg by mouth. atorvastatin 80 mg tablet Commonly known as:  LIPITOR  
TAKE 1 TABLET BY MOUTH AT BEDTIME  
  
 AZOPT 1 % ophthalmic suspension Generic drug:  brinzolamide BETIMOL 0.5 % ophthalmic solution Generic drug:  timolol Administer 1 Drop to both eyes two (2) times a day. use in affected eye(s)  
  
 dilTIAZem  mg ER capsule Commonly known as:  CARDIZEM CD Take 1 Cap by mouth daily. fexofenadine 180 mg tablet Commonly known as:  Elinore Alma Take  by mouth. ibuprofen 800 mg tablet Commonly known as:  MOTRIN  
TAKE 1 TABLET BY MOUTH EVERY 8 HOURS AS NEEDED FOR PAIN  
  
 LUMIGAN 0.01 % ophthalmic drops Generic drug:  bimatoprost  
  
 meclizine 12.5 mg tablet Commonly known as:  ANTIVERT Take 1 Tab by mouth three (3) times daily as needed. methazolAMIDE 50 mg tablet Commonly known as:  NEPTAZANE  
  
 multivitamin tablet Commonly known as:  ONE A DAY Take 1 Tab by mouth daily. prednisoLONE acetate 1 % ophthalmic suspension Commonly known as:  PRED FORTE INSTILL 1 DROP INTO RIGHT EYE 4 TIMES A DAY  
  
 ramipril 10 mg capsule Commonly known as:  ALTACE  
TAKE 2 CAPSULES BY MOUTH EVERY DAY  
  
 SALINE NASAL 0.65 % nasal spray Generic drug:  sodium chloride INHALE 2 SPRAYS IN EACH NOSTRIL 4 TIMES A DAY FOR 14 DAYS  
  
 sodium chloride irrigation 0.9 % irrigation IRRIGATE EACH NASAL CAVITY WITH 60CCS OF NORMAL SALINE SOLUTION DAILY. DISP: 1 LITER Prescriptions Sent to Pharmacy Refills  
 meclizine (ANTIVERT) 12.5 mg tablet 1 Sig: Take 1 Tab by mouth three (3) times daily as needed. Class: Normal  
 Pharmacy: [facility]/pharmacy #[id number] AT [address] [place] [address] #: [phone] Route: Oral  
  
We Performed the Following AMB POC GLUCOSE, QUANTITATIVE, BLOOD [91681 CPT(R)] LIPID PANEL [93200 CPT(R)] METABOLIC PANEL, COMPREHENSIVE [37324 CPT(R)] PROSTATE SPECIFIC AG (PSA) V6130108 CPT(R)] Follow-up Instructions Return in about 3 months (around 10/10/2017). Introducing [facility]! [facility] introduces [employer] patient portal. Now you can access parts of your medical record, email your doctor's office, and request medication refills online. 1. In your internet browser, go to [email or web address] 2. Click on the First Time User? Click Here link in the Sign In box. You will see the New Member Sign Up page. 3. Enter your [employer] Access Code exactly as it appears below. You will not need to use this code after youve completed the sign-up process. If you do not sign up before the expiration date, you must request a new code. · [employer] Access Code: [email or web address] Expires: 10/8/2017 11:33 AM 
 
4. Enter the last four digits of your Social Security Number (xxxx) and Date of Birth (mm/dd/yyyy) as indicated and click Submit.  You will be taken to the next sign-up page. 5. Create a [employer] ID. This will be your [employer] login ID and cannot be changed, so think of one that is secure and easy to remember. 6. Create a [employer] password. You can change your password at any time. 7. Enter your Password Reset Question and Answer. This can be used at a later time if you forget your password. 8. Enter your e-mail address. You will receive e-mail notification when new information is available in [address]. 9. Click Sign Up. You can now view and download portions of your medical record. 10. Click the Download Summary menu link to download a portable copy of your medical information. If you have questions, please visit the Frequently Asked Questions section of the [employer] website. Remember, [employer] is NOT to be used for urgent needs. For medical emergencies, dial 911. Now available from your iPhone and Android! Please provide this summary of care documentation to your next provider. Your primary care clinician is listed as Fariha Sullivan. If you have any questions after today's visit, please call [phone].

## 2017-07-10 NOTE — PROGRESS NOTES
HISTORY OF PRESENT ILLNESS  Diana Guido is a 61 y.o. male. F/un hbp,chol,ar dizziness. Doing well  Hypertension    The history is provided by the patient. This is a chronic problem. The problem has not changed since onset. Associated symptoms include dizziness. Pertinent negatives include no chest pain, no orthopnea, no malaise/fatigue and no peripheral edema. Cholesterol Problem   This is a chronic problem. The problem occurs daily. The problem has not changed since onset. Pertinent negatives include no chest pain. Dizziness    This is a chronic problem. The problem occurs daily. The problem has not changed since onset. He lost consciousness for a period of less than one minute. Associated symptoms include dizziness. Pertinent negatives include no chest pain and no malaise/fatigue. Review of Systems   Constitutional: Negative for malaise/fatigue. Cardiovascular: Negative for chest pain and orthopnea. Neurological: Positive for dizziness. Physical Exam   Constitutional: He appears well-developed and well-nourished. HENT:   Head: Normocephalic and atraumatic. Right Ear: External ear normal.   Left Ear: External ear normal.   Nose: Nose normal.   Mouth/Throat: Oropharynx is clear and moist.   Neck: Normal range of motion. Neck supple. Cardiovascular: Normal rate and regular rhythm. Pulmonary/Chest: Effort normal and breath sounds normal.   Abdominal: Soft. Bowel sounds are normal.   Skin: Skin is warm and dry. ASSESSMENT and PLAN  Keli Carias was seen today for hypertension and cholesterol problem. Diagnoses and all orders for this visit:    Essential hypertension, benign,controlled  -     METABOLIC PANEL, COMPREHENSIVE    Chronic maxillary sinusitis    Mixed hyperlipidemia  -     LIPID PANEL    Dizziness,chronic,multifactorial  -     meclizine (ANTIVERT) 12.5 mg tablet; Take 1 Tab by mouth three (3) times daily as needed.     Gastroesophageal reflux disease with esophagitis    Nocturia  -     PROSTATE SPECIFIC AG  -     AMB POC GLUCOSE, QUANTITATIVE, BLOOD    Other orders  -     CVD REPORT      Follow-up Disposition:  Return in about 3 months (around 10/10/2017).

## 2017-07-11 LAB
ALBUMIN SERPL-MCNC: 4.3 G/DL (ref 3.6–4.8)
ALBUMIN/GLOB SERPL: 1.6 {RATIO} (ref 1.2–2.2)
ALP SERPL-CCNC: 116 IU/L (ref 39–117)
ALT SERPL-CCNC: 31 IU/L (ref 0–44)
AST SERPL-CCNC: 21 IU/L (ref 0–40)
BILIRUB SERPL-MCNC: 0.9 MG/DL (ref 0–1.2)
BUN SERPL-MCNC: 11 MG/DL (ref 8–27)
BUN/CREAT SERPL: 10 (ref 10–24)
CALCIUM SERPL-MCNC: 9.6 MG/DL (ref 8.6–10.2)
CHLORIDE SERPL-SCNC: 99 MMOL/L (ref 96–106)
CHOLEST SERPL-MCNC: 130 MG/DL (ref 100–199)
CO2 SERPL-SCNC: 27 MMOL/L (ref 18–29)
CREAT SERPL-MCNC: 1.05 MG/DL (ref 0.76–1.27)
GLOBULIN SER CALC-MCNC: 2.7 G/DL (ref 1.5–4.5)
GLUCOSE SERPL-MCNC: 111 MG/DL (ref 65–99)
HDLC SERPL-MCNC: 48 MG/DL
INTERPRETATION, 910389: NORMAL
LDLC SERPL CALC-MCNC: 57 MG/DL (ref 0–99)
POTASSIUM SERPL-SCNC: 4.3 MMOL/L (ref 3.5–5.2)
PROT SERPL-MCNC: 7 G/DL (ref 6–8.5)
PSA SERPL-MCNC: 1.6 NG/ML (ref 0–4)
SODIUM SERPL-SCNC: 143 MMOL/L (ref 134–144)
TRIGL SERPL-MCNC: 126 MG/DL (ref 0–149)
VLDLC SERPL CALC-MCNC: 25 MG/DL (ref 5–40)

## 2017-08-08 ENCOUNTER — OFFICE VISIT (OUTPATIENT)
Dept: FAMILY MEDICINE CLINIC | Age: 67
End: 2017-08-08

## 2017-08-08 VITALS
BODY MASS INDEX: 34.16 KG/M2 | HEART RATE: 77 BPM | WEIGHT: 252.2 LBS | HEIGHT: 72 IN | DIASTOLIC BLOOD PRESSURE: 78 MMHG | TEMPERATURE: 98.5 F | SYSTOLIC BLOOD PRESSURE: 138 MMHG | RESPIRATION RATE: 20 BRPM | OXYGEN SATURATION: 98 %

## 2017-08-08 DIAGNOSIS — H25.9 AGE-RELATED CATARACT OF RIGHT EYE, UNSPECIFIED AGE-RELATED CATARACT TYPE: Primary | ICD-10-CM

## 2017-08-08 DIAGNOSIS — I10 ESSENTIAL HYPERTENSION, BENIGN: ICD-10-CM

## 2017-08-08 DIAGNOSIS — J30.89 NON-SEASONAL ALLERGIC RHINITIS, UNSPECIFIED ALLERGIC RHINITIS TRIGGER: ICD-10-CM

## 2017-08-08 DIAGNOSIS — J45.20 MILD INTERMITTENT ASTHMA WITHOUT COMPLICATION: ICD-10-CM

## 2017-08-08 DIAGNOSIS — Z01.818 PREOP EXAMINATION: ICD-10-CM

## 2017-08-08 DIAGNOSIS — L23.9 ALLERGIC DERMATITIS: ICD-10-CM

## 2017-08-08 RX ORDER — TRIAMCINOLONE ACETONIDE 1 MG/G
CREAM TOPICAL
Qty: 85 G | Refills: 2 | Status: SHIPPED | OUTPATIENT
Start: 2017-08-08

## 2017-08-08 NOTE — PROGRESS NOTES
Subjective:     Salvatore Velasco is a 61 y.o. male presenting for annual exam and complete physical.    Patient Active Problem List   Diagnosis Code    Essential hypertension, benign I10    Glaucoma H40.9    Asthma J45.909    Allergic rhinitis J30.9    OA (osteoarthritis) M19.90    Encounter for long-term (current) use of other medications Z79.899    Mixed hyperlipidemia E78.2    Nocturia R35.1    Cervical radiculitis M54.12    GERD (gastroesophageal reflux disease) K21.9    Chronic maxillary sinusitis J32.0     Patient Active Problem List    Diagnosis Date Noted    Chronic maxillary sinusitis 06/21/2016    GERD (gastroesophageal reflux disease) 03/03/2015    Nocturia 11/09/2012    Cervical radiculitis 11/09/2012    Mixed hyperlipidemia 05/27/2011    OA (osteoarthritis) 11/26/2010    Encounter for long-term (current) use of other medications 11/26/2010    Essential hypertension, benign 11/04/2010    Glaucoma 11/04/2010    Asthma 11/04/2010    Allergic rhinitis 11/04/2010     Current Outpatient Prescriptions   Medication Sig Dispense Refill    triamcinolone acetonide (KENALOG) 0.1 % topical cream Apply  to affected area three (3) times daily as needed for Skin Irritation. 85 g 2    atorvastatin (LIPITOR) 80 mg tablet TAKE 1 TABLET BY MOUTH AT BEDTIME 30 Tab 5    ramipril (ALTACE) 10 mg capsule TAKE 2 CAPSULES BY MOUTH EVERY DAY 60 Cap 10    dilTIAZem CD (CARDIZEM CD) 240 mg ER capsule Take 1 Cap by mouth daily. 30 Cap 11    fexofenadine (ALLEGRA) 180 mg tablet Take  by mouth.  multivitamin (ONE A DAY) tablet Take 1 Tab by mouth daily.  prednisoLONE acetate (PRED FORTE) 1 % ophthalmic suspension INSTILL 1 DROP INTO RIGHT EYE 4 TIMES A DAY  1    ibuprofen (MOTRIN) 800 mg tablet TAKE 1 TABLET BY MOUTH EVERY 8 HOURS AS NEEDED FOR PAIN 50 Tab 2    sodium chloride irrigation 0.9 % irrigation IRRIGATE EACH NASAL CAVITY WITH 60CCS OF NORMAL SALINE SOLUTION DAILY.  DISP: 1 LITER  10    albuterol (PROAIR HFA) 90 mcg/actuation inhaler Take 2 Puffs by inhalation every six (6) hours as needed for Wheezing. 1 Inhaler 11    LUMIGAN 0.01 % ophthalmic drops   11    AZOPT 1 % ophthalmic suspension   11    loteprednol etabonate (ALREX) 0.2 % drps Administer 1 Drop to both eyes four (4) times daily.  aspirin 81 mg tablet Take 81 mg by mouth.  timolol (BETIMOL) 0.5 % ophthalmic solution Administer 1 Drop to both eyes two (2) times a day. use in affected eye(s)       brimonidine (ALPHAGAN P) 0.15 % ophthalmic solution Administer 1 Drop to both eyes three (3) times daily.  meclizine (ANTIVERT) 12.5 mg tablet Take 1 Tab by mouth three (3) times daily as needed. 30 Tab 1    methazolAMIDE (NEPTAZANE) 50 mg tablet       SALINE NASAL 0.65 % nasal spray INHALE 2 SPRAYS IN EACH NOSTRIL 4 TIMES A DAY FOR 14 DAYS  2     Allergies   Allergen Reactions    Diclofenac Other (comments)     Ringing in ears    Propoxyphene-Acetaminophen Rash    Sulfa (Sulfonamide Antibiotics) Shortness of Breath     Pt states he is not allergic.     Zithromax [Azithromycin] Rash     Past Medical History:   Diagnosis Date    Allergic rhinitis, cause unspecified 11/4/2010    Asthma 11/4/2010    Cervical radiculitis 11/9/2012    Encounter for long-term (current) use of other medications 11/26/2010    Essential hypertension, benign 11/4/2010    Glaucoma 11/4/2010    Hypertension     Mixed hyperlipidemia 5/27/2011    Nocturia 11/9/2012    OA (osteoarthritis) 11/26/2010     Past Surgical History:   Procedure Laterality Date    HX COLONOSCOPY      NASAL SCOPY,OPEN MAXILL SINUS       Family History   Problem Relation Age of Onset    No Known Problems Mother     No Known Problems Father     No Known Problems Brother     Cancer Brother      Social History   Substance Use Topics    Smoking status: Never Smoker    Smokeless tobacco: Never Used    Alcohol use No             Review of Systems  Constitutional: negative  Eyes: positive for cataracts and visual disturbance  Ears, nose, mouth, throat, and face: negative  Respiratory: negative  Cardiovascular: negative  Gastrointestinal: negative  Genitourinary:negative    Objective:     Visit Vitals    /78 (BP 1 Location: Right arm, BP Patient Position: Sitting)    Pulse 77    Temp 98.5 °F (36.9 °C)    Resp 20    Ht 6' (1.829 m)    Wt 252 lb 3.2 oz (114.4 kg)    SpO2 98%    BMI 34.2 kg/m2     Physical exam:   General appearance - alert, well appearing, and in no distress  Mental status - alert, oriented to person, place, and time  Eyes - pupils equal and reactive, extraocular eye movements intact  Ears - bilateral TM's and external ear canals normal  Nose - normal and patent, no erythema, discharge or polyps  Mouth - mucous membranes moist, pharynx normal without lesions  Neck - supple, no significant adenopathy  Chest - clear to auscultation, no wheezes, rales or rhonchi, symmetric air entry  Heart - normal rate, regular rhythm, normal S1, S2, no murmurs, rubs, clicks or gallops  Abdomen - soft, nontender, nondistended, no masses or organomegaly  Neurological - alert, oriented, normal speech, no focal findings or movement disorder noted  Extremities - peripheral pulses normal, no pedal edema, no clubbing or cyanosis  Skin - normal coloration and turgor, no rashes, no suspicious skin lesions noted     Assessment/Plan:     Cleared for Surgery  continue present plan, call if any problems. Diagnoses and all orders for this visit:    1. Age-related cataract of right eye, unspecified age-related cataract type    2. Preop examination  -     XR CHEST PA LAT; Future  -     AMB POC EKG ROUTINE W/ 12 LEADS, INTER & REP    3. Essential hypertension, benign    4. Non-seasonal allergic rhinitis, unspecified allergic rhinitis trigger    5. Mild intermittent asthma without complication    6.  Allergic dermatitis  -     triamcinolone acetonide (KENALOG) 0.1 % topical cream; Apply  to affected area three (3) times daily as needed for Skin Irritation. Follow-up Disposition:  Return in about 3 months (around 11/8/2017). Rojelio King

## 2017-08-08 NOTE — MR AVS SNAPSHOT
Visit Information Date & Time Provider Department Dept. Phone Encounter #  
 8/8/2017  2:15 PM [facility] [phone] [id number] Follow-up Instructions Return in about 3 months (around 11/8/2017). Your Appointments 9/27/2017  3:45 PM  
ROUTINE CARE with Carolina Lofton MD  
[facility] [address]) Appt Note: routine follow up  
 [address] [place] [id number] [phone] [address] [facility] [address] Upcoming Health Maintenance Date Due INFLUENZA AGE 9 TO ADULT 8/1/2017 COLONOSCOPY 2/17/2024 DTaP/Tdap/Td series (2 - Td) 7/8/2026 Allergies as of 8/8/2017  Review Complete On: 8/8/2017 By: Alicia Klein LPN Severity Noted Reaction Type Reactions Diclofenac  08/11/2010    Other (comments) Ringing in ears Propoxyphene-acetaminophen  08/11/2010    Rash  
 Sulfa (Sulfonamide Antibiotics)  08/11/2010    Shortness of Breath Pt states he is not allergic. Zithromax [Azithromycin]  08/08/2014    Rash Current Immunizations  Reviewed on 10/20/2014 Name Date Influenza Vaccine 10/20/2014 Influenza Vaccine PF 1/3/2014 Not reviewed this visit You Were Diagnosed With   
  
 Codes Comments Age-related cataract of right eye, unspecified age-related cataract type    -  Primary ICD-10-CM: H25.9 ICD-9-CM: 366.10 Preop examination     ICD-10-CM: F82.751 ICD-9-CM: V72.84 Essential hypertension, benign     ICD-10-CM: I10 
ICD-9-CM: 401.1 Non-seasonal allergic rhinitis, unspecified allergic rhinitis trigger     ICD-10-CM: J30.89 ICD-9-CM: 477.8 Mild intermittent asthma without complication     [facility]-68-KN: J45.20 ICD-9-CM: 493.90 Vitals BP Pulse Temp Resp Height(growth percentile) Weight(growth percentile)  138/78 (BP 1 Location: Right arm, BP Patient Position: Sitting) 77 98.5 °F (36.9 °C) 20 6' (1.829 m) 252 lb 3.2 oz (114.4 kg) SpO2 BMI Smoking Status 98% 34.2 kg/m2 Never Smoker Vitals History BMI and BSA Data Body Mass Index Body Surface Area  
 34.2 kg/m 2 2.41 m 2 Preferred Pharmacy Pharmacy Name Phone [id number] [place] - [address] [facility] AT [address] [place] [address] [phone] Your Updated Medication List  
  
   
This list is accurate as of: 8/8/17  2:56 PM.  Always use your most recent med list.  
  
  
  
  
 albuterol 90 mcg/actuation inhaler Commonly known as:  PROAIR HFA Take 2 Puffs by inhalation every six (6) hours as needed for Wheezing. ALPHAGAN P 0.15 % ophthalmic solution Generic drug:  brimonidine Administer 1 Drop to both eyes three (3) times daily. ALREX 0.2 % Drps Generic drug:  loteprednol etabonate Administer 1 Drop to both eyes four (4) times daily. aspirin 81 mg tablet Take 81 mg by mouth. atorvastatin 80 mg tablet Commonly known as:  LIPITOR  
TAKE 1 TABLET BY MOUTH AT BEDTIME  
  
 AZOPT 1 % ophthalmic suspension Generic drug:  brinzolamide BETIMOL 0.5 % ophthalmic solution Generic drug:  timolol Administer 1 Drop to both eyes two (2) times a day. use in affected eye(s)  
  
 dilTIAZem  mg ER capsule Commonly known as:  CARDIZEM CD Take 1 Cap by mouth daily. fexofenadine 180 mg tablet Commonly known as:  Celine Stallion Take  by mouth. ibuprofen 800 mg tablet Commonly known as:  MOTRIN  
TAKE 1 TABLET BY MOUTH EVERY 8 HOURS AS NEEDED FOR PAIN  
  
 LUMIGAN 0.01 % ophthalmic drops Generic drug:  bimatoprost  
  
 meclizine 12.5 mg tablet Commonly known as:  ANTIVERT Take 1 Tab by mouth three (3) times daily as needed. methazolAMIDE 50 mg tablet Commonly known as:  NEPTAZANE  
  
 multivitamin tablet Commonly known as:  ONE A DAY Take 1 Tab by mouth daily. prednisoLONE acetate 1 % ophthalmic suspension Commonly known as:  PRED FORTE INSTILL 1 DROP INTO RIGHT EYE 4 TIMES A DAY  
  
 ramipril 10 mg capsule Commonly known as:  ALTACE  
TAKE 2 CAPSULES BY MOUTH EVERY DAY  
  
 SALINE NASAL 0.65 % nasal spray Generic drug:  sodium chloride INHALE 2 SPRAYS IN EACH NOSTRIL 4 TIMES A DAY FOR 14 DAYS  
  
 sodium chloride irrigation 0.9 % irrigation IRRIGATE EACH NASAL CAVITY WITH 60CCS OF NORMAL SALINE SOLUTION DAILY. DISP: 1 LITER We Performed the Following AMB POC EKG ROUTINE W/ 12 LEADS, INTER & REP [67037 CPT(R)] Follow-up Instructions Return in about 3 months (around 11/8/2017). To-Do List   
 08/08/2017 Imaging:  XR CHEST PA LAT Introducing [facility]! Elaine [facility] introduces [employer] patient portal. Now you can access parts of your medical record, email your doctor's office, and request medication refills online. 1. In your internet browser, go to [email or web address] 2. Click on the First Time User? Click Here link in the Sign In box. You will see the New Member Sign Up page. 3. Enter your [employer] Access Code exactly as it appears below. You will not need to use this code after youve completed the sign-up process. If you do not sign up before the expiration date, you must request a new code. · [employer] Access Code: [email or web address] Expires: 10/8/2017 11:33 AM 
 
4. Enter the last four digits of your Social Security Number (xxxx) and Date of Birth (mm/dd/yyyy) as indicated and click Submit. You will be taken to the next sign-up page. 5. Create a [employer] ID. This will be your [employer] login ID and cannot be changed, so think of one that is secure and easy to remember. 6. Create a [employer] password. You can change your password at any time. 7. Enter your Password Reset Question and Answer. This can be used at a later time if you forget your password. 8. Enter your e-mail address. You will receive e-mail notification when new information is available in [address] 9. Click Sign Up. You can now view and download portions of your medical record. 10. Click the Download Summary menu link to download a portable copy of your medical information. If you have questions, please visit the Frequently Asked Questions section of the [employer] website. Remember, [employer] is NOT to be used for urgent needs. For medical emergencies, dial 911. Now available from your iPhone and Android! Please provide this summary of care documentation to your next provider. Your primary care clinician is listed as Maricruz Maynard. If you have any questions after today's visit, please call [phone].

## 2017-10-16 ENCOUNTER — OFFICE VISIT (OUTPATIENT)
Dept: FAMILY MEDICINE CLINIC | Age: 67
End: 2017-10-16

## 2017-10-16 VITALS
BODY MASS INDEX: 34.1 KG/M2 | SYSTOLIC BLOOD PRESSURE: 154 MMHG | HEART RATE: 68 BPM | RESPIRATION RATE: 20 BRPM | DIASTOLIC BLOOD PRESSURE: 86 MMHG | TEMPERATURE: 98.4 F | OXYGEN SATURATION: 95 % | HEIGHT: 72 IN | WEIGHT: 251.8 LBS

## 2017-10-16 DIAGNOSIS — J45.20 MILD INTERMITTENT ASTHMA WITHOUT COMPLICATION: ICD-10-CM

## 2017-10-16 DIAGNOSIS — R42 DIZZINESS: Primary | ICD-10-CM

## 2017-10-16 DIAGNOSIS — E78.2 MIXED HYPERLIPIDEMIA: ICD-10-CM

## 2017-10-16 DIAGNOSIS — H40.9 GLAUCOMA OF BOTH EYES, UNSPECIFIED GLAUCOMA TYPE: ICD-10-CM

## 2017-10-16 DIAGNOSIS — I10 ESSENTIAL HYPERTENSION, BENIGN: ICD-10-CM

## 2017-10-16 DIAGNOSIS — J30.89 NON-SEASONAL ALLERGIC RHINITIS, UNSPECIFIED CHRONICITY, UNSPECIFIED TRIGGER: ICD-10-CM

## 2017-10-16 RX ORDER — LANOLIN ALCOHOL/MO/W.PET/CERES
500 CREAM (GRAM) TOPICAL DAILY
Qty: 100 TAB | Refills: 3 | Status: SHIPPED | OUTPATIENT
Start: 2017-10-16 | End: 2018-12-07

## 2017-10-16 RX ORDER — ALBUTEROL SULFATE 90 UG/1
2 AEROSOL, METERED RESPIRATORY (INHALATION)
Qty: 1 INHALER | Refills: 11 | Status: SHIPPED | OUTPATIENT
Start: 2017-10-16 | End: 2019-04-09 | Stop reason: SDUPTHER

## 2017-10-16 RX ORDER — MECLIZINE HCL 12.5 MG 12.5 MG/1
12.5 TABLET ORAL
Qty: 30 TAB | Refills: 1 | Status: SHIPPED | OUTPATIENT
Start: 2017-10-16 | End: 2020-10-20

## 2017-10-16 NOTE — PROGRESS NOTES
HISTORY OF PRESENT ILLNESS  Page Pan is a 61 y.o. male. f/u hbp,chol,dizziness,ar,glaucoma. Doing ok,getting allergy shots. Occasional lightheadednes  Hypertension    This is a chronic problem. The problem has not changed since onset. Associated symptoms include dizziness. Pertinent negatives include no chest pain, no orthopnea, no palpitations, no malaise/fatigue, no headaches, no peripheral edema and no shortness of breath. Cholesterol Problem   This is a chronic problem. The problem occurs daily. The problem has not changed since onset. Pertinent negatives include no chest pain, no abdominal pain, no headaches and no shortness of breath. Dizziness    This is a recurrent problem. The problem occurs every several days. The problem has not changed since onset. The problem is associated with normal activity. Associated symptoms include dizziness. Pertinent negatives include no chest pain, no palpitations, no fever, no malaise/fatigue, no abdominal pain and no headaches. Review of Systems   Constitutional: Negative for fever and malaise/fatigue. Respiratory: Negative for shortness of breath. Cardiovascular: Negative for chest pain, palpitations and orthopnea. Gastrointestinal: Negative for abdominal pain. Neurological: Positive for dizziness. Negative for headaches. Psychiatric/Behavioral: Negative for depression. Physical Exam   Constitutional: He is oriented to person, place, and time. He appears well-developed and well-nourished. HENT:   Head: Normocephalic and atraumatic. Right Ear: External ear normal.   Left Ear: External ear normal.   Nose: Nose normal.   Mouth/Throat: Oropharynx is clear and moist.   Eyes: Conjunctivae are normal. Pupils are equal, round, and reactive to light. Neck: Normal range of motion. Neck supple. No tracheal deviation present. No thyromegaly present. Cardiovascular: Normal rate, regular rhythm, normal heart sounds and intact distal pulses. Pulmonary/Chest: Effort normal and breath sounds normal. No respiratory distress. Abdominal: Soft. Bowel sounds are normal. There is no tenderness. Lymphadenopathy:     He has no cervical adenopathy. Neurological: He is alert and oriented to person, place, and time. No cranial nerve deficit. Skin: Skin is warm and dry. Rash noted. Psychiatric: He has a normal mood and affect. Vitals reviewed. ASSESSMENT and PLAN  Diagnoses and all orders for this visit:    1. Dizziness,recurrent  -     meclizine (ANTIVERT) 12.5 mg tablet; Take 1 Tab by mouth three (3) times daily as needed. -     cyanocobalamin (VITAMIN B12) 500 mcg tablet; Take 1 Tab by mouth daily. 2. Mild intermittent asthma without complication  -     albuterol (PROAIR HFA) 90 mcg/actuation inhaler; Take 2 Puffs by inhalation every six (6) hours as needed for Wheezing. 3. Mixed hyperlipidemia  -     CHOLESTEROL, TOTAL    4. Essential hypertension, benign  -     METABOLIC PANEL, COMPREHENSIVE  -     CBC WITH AUTOMATED DIFF    5. Glaucoma of both eyes, unspecified glaucoma type    6. Non-seasonal allergic rhinitis, unspecified chronicity, unspecified trigger      Follow-up Disposition:  Return in about 3 months (around 1/16/2018).

## 2017-10-16 NOTE — PROGRESS NOTES
Chief Complaint   Patient presents with    Hypertension     F/U on BP.  Cholesterol Problem     F/U on cholesterol.  Dizziness     Pt having dizziness.

## 2017-10-16 NOTE — MR AVS SNAPSHOT
Visit Information Date & Time Provider Department Dept. Phone Encounter #  
 10/16/2017  3:30 PM Blake Cristina, [phone] [id number] Follow-up Instructions Return in about 3 months (around 1/16/2018). Upcoming Health Maintenance Date Due COLONOSCOPY 2/17/2024 DTaP/Tdap/Td series (2 - Td) 7/8/2026 Allergies as of 10/16/2017  Review Complete On: 10/16/2017 By: Blake Cristina MD  
  
 Severity Noted Reaction Type Reactions Diclofenac  08/11/2010    Other (comments) Ringing in ears Propoxyphene-acetaminophen  08/11/2010    Rash  
 Sulfa (Sulfonamide Antibiotics)  08/11/2010    Shortness of Breath Pt states he is not allergic. Zithromax [Azithromycin]  08/08/2014    Rash Current Immunizations  Reviewed on 10/20/2014 Name Date Influenza Vaccine 10/20/2014 Influenza Vaccine PF 1/3/2014 Not reviewed this visit You Were Diagnosed With   
  
 Codes Comments Mixed hyperlipidemia    -  Primary ICD-10-CM: A08.4 ICD-9-CM: 272.2 Dizziness     ICD-10-CM: C18 ICD-9-CM: 780.4 Mild intermittent asthma without complication     FTZ-58-EX: J45.20 ICD-9-CM: 493.90 Essential hypertension, benign     ICD-10-CM: I10 
ICD-9-CM: 401.1 Glaucoma of both eyes, unspecified glaucoma type     ICD-10-CM: H40.9 ICD-9-CM: 365.9 Non-seasonal allergic rhinitis, unspecified chronicity, unspecified trigger     ICD-10-CM: J30.89 ICD-9-CM: 477.8 Vitals BP Pulse Temp Resp Height(growth percentile) Weight(growth percentile) 154/86 (BP 1 Location: Left arm, BP Patient Position: Sitting) 68 98.4 °F (36.9 °C) (Oral) 20 6' (1.829 m) 251 lb 12.8 oz (114.2 kg) SpO2 BMI Smoking Status 95% 34.15 kg/m2 Never Smoker Vitals History BMI and BSA Data Body Mass Index Body Surface Area  
 34.15 kg/m 2 2.41 m 2 Preferred Pharmacy Pharmacy Name Phone [facility]/PHARMACY #[id number]- [place], [place] - [address] [facility] AT [address] [facility] [phone] Your Updated Medication List  
  
   
This list is accurate as of: 10/16/17  3:56 PM.  Always use your most recent med list.  
  
  
  
  
 albuterol 90 mcg/actuation inhaler Commonly known as:  PROAIR HFA Take 2 Puffs by inhalation every six (6) hours as needed for Wheezing. ALPHAGAN P 0.15 % ophthalmic solution Generic drug:  brimonidine Administer 1 Drop to both eyes three (3) times daily. ALREX 0.2 % Drps Generic drug:  loteprednol etabonate Administer 1 Drop to both eyes four (4) times daily. aspirin 81 mg tablet Take 81 mg by mouth. atorvastatin 80 mg tablet Commonly known as:  LIPITOR  
TAKE 1 TABLET BY MOUTH AT BEDTIME  
  
 AZOPT 1 % ophthalmic suspension Generic drug:  brinzolamide BETIMOL 0.5 % ophthalmic solution Generic drug:  timolol Administer 1 Drop to both eyes two (2) times a day. use in affected eye(s)  
  
 dilTIAZem  mg ER capsule Commonly known as:  CARDIZEM CD Take 1 Cap by mouth daily. fexofenadine 180 mg tablet Commonly known as:  Arty Most Take  by mouth. ibuprofen 800 mg tablet Commonly known as:  MOTRIN  
TAKE 1 TABLET BY MOUTH EVERY 8 HOURS AS NEEDED FOR PAIN  
  
 LUMIGAN 0.01 % ophthalmic drops Generic drug:  bimatoprost  
  
 meclizine 12.5 mg tablet Commonly known as:  ANTIVERT Take 1 Tab by mouth three (3) times daily as needed. methazolAMIDE 50 mg tablet Commonly known as:  NEPTAZANE  
  
 multivitamin tablet Commonly known as:  ONE A DAY Take 1 Tab by mouth daily. prednisoLONE acetate 1 % ophthalmic suspension Commonly known as:  PRED FORTE INSTILL 1 DROP INTO RIGHT EYE 4 TIMES A DAY  
  
 ramipril 10 mg capsule Commonly known as:  ALTACE  
TAKE 2 CAPSULES BY MOUTH EVERY DAY  
  
 SALINE NASAL 0.65 % nasal spray Generic drug:  sodium chloride INHALE 2 SPRAYS IN EACH NOSTRIL 4 TIMES A DAY FOR 14 DAYS  
  
 sodium chloride irrigation 0.9 % irrigation IRRIGATE EACH NASAL CAVITY WITH 60CCS OF NORMAL SALINE SOLUTION DAILY. DISP: 1 LITER  
  
 triamcinolone acetonide 0.1 % topical cream  
Commonly known as:  KENALOG Apply  to affected area three (3) times daily as needed for Skin Irritation. Prescriptions Sent to Pharmacy Refills  
 meclizine (ANTIVERT) 12.5 mg tablet 1 Sig: Take 1 Tab by mouth three (3) times daily as needed. Class: Normal  
 Pharmacy: [facility]/pharmacy #[id number] AT [address] [place] [address] Ph #: [phone] Route: Oral  
 albuterol (PROAIR HFA) 90 mcg/actuation inhaler 11 Sig: Take 2 Puffs by inhalation every six (6) hours as needed for Wheezing. Class: Normal  
 Pharmacy: [facility]/pharmacy #[id number] AT [address] [place] [address] Ph #: [phone] Route: Inhalation We Performed the Following CBC WITH AUTOMATED DIFF [83758 CPT(R)] CHOLESTEROL, TOTAL [06291 CPT(R)] METABOLIC PANEL, COMPREHENSIVE [37605 CPT(R)] Follow-up Instructions Return in about 3 months (around 1/16/2018). Introducing [facility] & HEALTH SERVICES! [facility] introduces [employer] patient portal. Now you can access parts of your medical record, email your doctor's office, and request medication refills online. 1. In your internet browser, go to [email or web address] 2. Click on the First Time User? Click Here link in the Sign In box. You will see the New Member Sign Up page. 3. Enter your [employer]erated Sample Access Code exactly as it appears below. You will not need to use this code after youve completed the sign-up process. If you do not sign up before the expiration date, you must request a new code. · Federated Sample Access Code: [facility] Expires: 1/14/2018  3:56 PM 
 
4.  Enter the last four digits of your Social Security Number (xxxx) and Date of Birth (mm/dd/yyyy) as indicated and click Submit. You will be taken to the next sign-up page. 5. Create a Linden Mobile ID. This will be your [employer] login ID and cannot be changed, so think of one that is secure and easy to remember. 6. Create a [employer] password. You can change your password at any time. 7. Enter your Password Reset Question and Answer. This can be used at a later time if you forget your password. 8. Enter your e-mail address. You will receive e-mail notification when new information is available in [address] 9. Click Sign Up. You can now view and download portions of your medical record. 10. Click the Download Summary menu link to download a portable copy of your medical information. If you have questions, please visit the Frequently Asked Questions section of the [employer] website. Remember, [employer] is NOT to be used for urgent needs. For medical emergencies, dial 911. Now available from your iPhone and Android! Please provide this summary of care documentation to your next provider. Your primary care clinician is listed as Reid Dukes. If you have any questions after today's visit, please call [phone].

## 2017-10-17 LAB
ALBUMIN SERPL-MCNC: 4.3 G/DL (ref 3.6–4.8)
ALBUMIN/GLOB SERPL: 1.6 {RATIO} (ref 1.2–2.2)
ALP SERPL-CCNC: 115 IU/L (ref 39–117)
ALT SERPL-CCNC: 28 IU/L (ref 0–44)
AST SERPL-CCNC: 24 IU/L (ref 0–40)
BASOPHILS # BLD AUTO: 0.1 X10E3/UL (ref 0–0.2)
BASOPHILS NFR BLD AUTO: 1 %
BILIRUB SERPL-MCNC: 1 MG/DL (ref 0–1.2)
BUN SERPL-MCNC: 12 MG/DL (ref 8–27)
BUN/CREAT SERPL: 13 (ref 10–24)
CALCIUM SERPL-MCNC: 9.7 MG/DL (ref 8.6–10.2)
CHLORIDE SERPL-SCNC: 101 MMOL/L (ref 96–106)
CHOLEST SERPL-MCNC: 122 MG/DL (ref 100–199)
CO2 SERPL-SCNC: 28 MMOL/L (ref 18–29)
CREAT SERPL-MCNC: 0.96 MG/DL (ref 0.76–1.27)
EOSINOPHIL # BLD AUTO: 0.6 X10E3/UL (ref 0–0.4)
EOSINOPHIL NFR BLD AUTO: 8 %
ERYTHROCYTE [DISTWIDTH] IN BLOOD BY AUTOMATED COUNT: 13.7 % (ref 12.3–15.4)
GLOBULIN SER CALC-MCNC: 2.7 G/DL (ref 1.5–4.5)
GLUCOSE SERPL-MCNC: 101 MG/DL (ref 65–99)
HCT VFR BLD AUTO: 40.4 % (ref 37.5–51)
HGB BLD-MCNC: 13.4 G/DL (ref 12.6–17.7)
IMM GRANULOCYTES # BLD: 0 X10E3/UL (ref 0–0.1)
IMM GRANULOCYTES NFR BLD: 0 %
LYMPHOCYTES # BLD AUTO: 3.2 X10E3/UL (ref 0.7–3.1)
LYMPHOCYTES NFR BLD AUTO: 40 %
MCH RBC QN AUTO: 28.6 PG (ref 26.6–33)
MCHC RBC AUTO-ENTMCNC: 33.2 G/DL (ref 31.5–35.7)
MCV RBC AUTO: 86 FL (ref 79–97)
MONOCYTES # BLD AUTO: 0.8 X10E3/UL (ref 0.1–0.9)
MONOCYTES NFR BLD AUTO: 10 %
NEUTROPHILS # BLD AUTO: 3.3 X10E3/UL (ref 1.4–7)
NEUTROPHILS NFR BLD AUTO: 41 %
PLATELET # BLD AUTO: 327 X10E3/UL (ref 150–379)
POTASSIUM SERPL-SCNC: 4 MMOL/L (ref 3.5–5.2)
PROT SERPL-MCNC: 7 G/DL (ref 6–8.5)
RBC # BLD AUTO: 4.69 X10E6/UL (ref 4.14–5.8)
SODIUM SERPL-SCNC: 142 MMOL/L (ref 134–144)
WBC # BLD AUTO: 8 X10E3/UL (ref 3.4–10.8)

## 2017-11-28 RX ORDER — IBUPROFEN 800 MG/1
TABLET ORAL
Qty: 50 TAB | Refills: 1 | Status: SHIPPED | OUTPATIENT
Start: 2017-11-28 | End: 2018-07-16 | Stop reason: SDUPTHER

## 2017-12-20 RX ORDER — ATORVASTATIN CALCIUM 80 MG/1
TABLET, FILM COATED ORAL
Qty: 30 TAB | Refills: 5 | Status: SHIPPED | OUTPATIENT
Start: 2017-12-20 | End: 2018-06-09 | Stop reason: SDUPTHER

## 2018-01-15 ENCOUNTER — OFFICE VISIT (OUTPATIENT)
Dept: FAMILY MEDICINE CLINIC | Age: 68
End: 2018-01-15

## 2018-01-15 ENCOUNTER — HOSPITAL ENCOUNTER (OUTPATIENT)
Dept: LAB | Age: 68
Discharge: HOME | End: 2018-01-15
Payer: MEDICARE

## 2018-01-15 VITALS
HEART RATE: 61 BPM | DIASTOLIC BLOOD PRESSURE: 84 MMHG | RESPIRATION RATE: 24 BRPM | OXYGEN SATURATION: 98 % | HEIGHT: 72 IN | SYSTOLIC BLOOD PRESSURE: 128 MMHG | WEIGHT: 254.4 LBS | BODY MASS INDEX: 34.46 KG/M2 | TEMPERATURE: 97.7 F

## 2018-01-15 DIAGNOSIS — Z23 ENCOUNTER FOR IMMUNIZATION: ICD-10-CM

## 2018-01-15 DIAGNOSIS — R42 DIZZINESS: ICD-10-CM

## 2018-01-15 DIAGNOSIS — J30.89 NON-SEASONAL ALLERGIC RHINITIS, UNSPECIFIED CHRONICITY, UNSPECIFIED TRIGGER: ICD-10-CM

## 2018-01-15 DIAGNOSIS — I10 ESSENTIAL HYPERTENSION, BENIGN: Primary | ICD-10-CM

## 2018-01-15 PROCEDURE — 85025 COMPLETE CBC W/AUTO DIFF WBC: CPT

## 2018-01-15 PROCEDURE — 36415 COLL VENOUS BLD VENIPUNCTURE: CPT

## 2018-01-15 PROCEDURE — 80053 COMPREHEN METABOLIC PANEL: CPT

## 2018-01-15 RX ORDER — HYDROCHLOROTHIAZIDE 12.5 MG/1
12.5 TABLET ORAL DAILY
Qty: 30 TAB | Refills: 11 | Status: SHIPPED | OUTPATIENT
Start: 2018-01-15 | End: 2018-12-21 | Stop reason: SDUPTHER

## 2018-01-15 NOTE — MR AVS SNAPSHOT
[address] [facility] 
 
 
 [address] [facility] [id number] 
[phone] Patient: Jono Elmore MRN: [id number] :1950 Visit Information Date & Time Provider Department Dept. Phone Encounter #  
 1/15/2018  3:15 PM Mesha Sherman [phone] [id number] Follow-up Instructions Return in about 3 months (around 4/15/2018). Upcoming Health Maintenance Date Due  
 GLAUCOMA SCREENING Q2Y 2017 Pneumococcal 65+ Low/Medium Risk (1 of 2 - PCV13) 2017 MEDICARE YEARLY EXAM 2017 COLONOSCOPY 2024 DTaP/Tdap/Td series (2 - Td) 2026 Allergies as of 1/15/2018  Review Complete On: 1/15/2018 By: Karrie Rico Severity Noted Reaction Type Reactions Diclofenac  2010    Other (comments) Ringing in ears Propoxyphene-acetaminophen  2010    Rash  
 Sulfa (Sulfonamide Antibiotics)  2010    Shortness of Breath Pt states he is not allergic. Zithromax [Azithromycin]  2014    Rash Current Immunizations  Reviewed on 10/20/2014 Name Date Influenza Vaccine 10/20/2014 Influenza Vaccine PF 1/3/2014 Pneumococcal Conjugate (PCV-13)  Incomplete Not reviewed this visit You Were Diagnosed With   
  
 Codes Comments Essential hypertension, benign    -  Primary ICD-10-CM: I10 
ICD-9-CM: 401.1 Non-seasonal allergic rhinitis, unspecified chronicity, unspecified trigger     ICD-10-CM: J30.89 ICD-9-CM: 477.8 Dizziness     ICD-10-CM: I66 ICD-9-CM: 780.4 Encounter for immunization     ICD-10-CM: E50 ICD-9-CM: V03.89 Vitals BP Pulse Temp Resp Height(growth percentile) Weight(growth percentile) 128/84 (BP 1 Location: Left arm, BP Patient Position: Sitting) 61 97.7 °F (36.5 °C) (Oral) 24 6' (1.829 m) 254 lb 6.4 oz (115.4 kg) SpO2 BMI Smoking Status 98% 34.5 kg/m2 Never Smoker Vitals History BMI and BSA Data Body Mass Index Body Surface Area 34.5 kg/m 2 2.42 m 2 Preferred Pharmacy Pharmacy Name Phone [facility]/PHARMACY #[id number]- [place] - [address] AT [address] [place], [place] [phone] Your Updated Medication List  
  
   
This list is accurate as of: 1/15/18  3:59 PM.  Always use your most recent med list.  
  
  
  
  
 albuterol 90 mcg/actuation inhaler Commonly known as:  PROAIR HFA Take 2 Puffs by inhalation every six (6) hours as needed for Wheezing. ALPHAGAN P 0.15 % ophthalmic solution Generic drug:  brimonidine Administer 1 Drop to both eyes three (3) times daily. ALREX 0.2 % Drps Generic drug:  loteprednol etabonate Administer 1 Drop to both eyes four (4) times daily. aspirin 81 mg tablet Take 81 mg by mouth. atorvastatin 80 mg tablet Commonly known as:  LIPITOR  
TAKE 1 TABLET BY MOUTH AT BEDTIME  
  
 AZOPT 1 % ophthalmic suspension Generic drug:  brinzolamide BETIMOL 0.5 % ophthalmic solution Generic drug:  timolol Administer 1 Drop to both eyes two (2) times a day. use in affected eye(s)  
  
 cyanocobalamin 500 mcg tablet Commonly known as:  VITAMIN B12 Take 1 Tab by mouth daily. dilTIAZem  mg ER capsule Commonly known as:  CARDIZEM CD Take 1 Cap by mouth daily. fexofenadine 180 mg tablet Commonly known as:  Radha Toan Take  by mouth. hydroCHLOROthiazide 12.5 mg tablet Commonly known as:  HYDRODIURIL Take 1 Tab by mouth daily. ibuprofen 800 mg tablet Commonly known as:  MOTRIN  
TAKE 1 TABLET BY MOUTH EVERY 8 HOURS AS NEEDED FOR PAIN  
  
 LUMIGAN 0.01 % ophthalmic drops Generic drug:  bimatoprost  
  
 meclizine 12.5 mg tablet Commonly known as:  ANTIVERT Take 1 Tab by mouth three (3) times daily as needed. methazolAMIDE 50 mg tablet Commonly known as:  NEPTAZANE  
  
 multivitamin tablet Commonly known as:  ONE A DAY  
 Take 1 Tab by mouth daily. prednisoLONE acetate 1 % ophthalmic suspension Commonly known as:  PRED FORTE INSTILL 1 DROP INTO RIGHT EYE 4 TIMES A DAY  
  
 ramipril 10 mg capsule Commonly known as:  ALTACE  
TAKE 2 CAPSULES BY MOUTH EVERY DAY  
  
 SALINE NASAL 0.65 % nasal spray Generic drug:  sodium chloride INHALE 2 SPRAYS IN EACH NOSTRIL 4 TIMES A DAY FOR 14 DAYS  
  
 sodium chloride irrigation 0.9 % irrigation IRRIGATE EACH NASAL CAVITY WITH 60CCS OF NORMAL SALINE SOLUTION DAILY. DISP: 1 LITER  
  
 triamcinolone acetonide 0.1 % topical cream  
Commonly known as:  KENALOG Apply  to affected area three (3) times daily as needed for Skin Irritation. Prescriptions Sent to Pharmacy Refills  
 hydroCHLOROthiazide (HYDRODIURIL) 12.5 mg tablet 11 Sig: Take 1 Tab by mouth daily. Class: Normal  
 Pharmacy: [facility]/pharmacy #[id number] AT [address] [place] [address] Ph #: [phone] Route: Oral  
  
We Performed the Following CBC WITH AUTOMATED DIFF [03849 CPT(R)] METABOLIC PANEL, COMPREHENSIVE [93099 CPT(R)] PNEUMOCOCCAL CONJ VACCINE 13 VALENT IM [id number] CPT(R)] Follow-up Instructions Return in about 3 months (around 4/15/2018). Introducing [facility] & HE[facility]! Sheeba Pedersen introduces [employer] patient portal. Now you can access parts of your medical record, email your doctor's office, and request medication refills online. 1. In your internet browser, go to [email or web address] 2. Click on the First Time User? Click Here link in the Sign In box. You will see the New Member Sign Up page. 3. Enter your Mirimus Access Code exactly as it appears below. You will not need to use this code after youve completed the sign-up process. If you do not sign up before the expiration date, you must request a new code. · Mirimus Access Code: [email or web address] Expires: 4/15/2018  3:07 PM 
 
 4. Enter the last four digits of your Social Security Number (xxxx) and Date of Birth (mm/dd/yyyy) as indicated and click Submit. You will be taken to the next sign-up page. 5. Create a Pixel [employer] ID. This will be your [employer] login ID and cannot be changed, so think of one that is secure and easy to remember. 6. Create a [employer] password. You can change your password at any time. 7. Enter your Password Reset Question and Answer. This can be used at a later time if you forget your password. 8. Enter your e-mail address. You will receive e-mail notification when new information is available in [address] 9. Click Sign Up. You can now view and download portions of your medical record. 10. Click the Download Summary menu link to download a portable copy of your medical information. If you have questions, please visit the Frequently Asked Questions section of the [employer] website. Remember, [employer] is NOT to be used for urgent needs. For medical emergencies, dial 911. Now available from your iPhone and Android! Please provide this summary of care documentation to your next provider. Your primary care clinician is listed as Vera Medina. If you have any questions after today's visit, please call [phone].

## 2018-01-16 LAB
ALBUMIN SERPL-MCNC: 4.5 G/DL (ref 3.6–4.8)
ALBUMIN/GLOB SERPL: 1.7 {RATIO} (ref 1.2–2.2)
ALP SERPL-CCNC: 107 IU/L (ref 39–117)
ALT SERPL-CCNC: 27 IU/L (ref 0–44)
AST SERPL-CCNC: 25 IU/L (ref 0–40)
BASOPHILS # BLD AUTO: 0.1 X10E3/UL (ref 0–0.2)
BASOPHILS NFR BLD AUTO: 1 %
BILIRUB SERPL-MCNC: 0.9 MG/DL (ref 0–1.2)
BUN SERPL-MCNC: 11 MG/DL (ref 8–27)
BUN/CREAT SERPL: 11 (ref 10–24)
CALCIUM SERPL-MCNC: 9.5 MG/DL (ref 8.6–10.2)
CHLORIDE SERPL-SCNC: 101 MMOL/L (ref 96–106)
CO2 SERPL-SCNC: 25 MMOL/L (ref 18–29)
CREAT SERPL-MCNC: 0.99 MG/DL (ref 0.76–1.27)
EOSINOPHIL # BLD AUTO: 0.3 X10E3/UL (ref 0–0.4)
EOSINOPHIL NFR BLD AUTO: 4 %
ERYTHROCYTE [DISTWIDTH] IN BLOOD BY AUTOMATED COUNT: 13.7 % (ref 12.3–15.4)
GLOBULIN SER CALC-MCNC: 2.7 G/DL (ref 1.5–4.5)
GLUCOSE SERPL-MCNC: 98 MG/DL (ref 65–99)
HCT VFR BLD AUTO: 40.1 % (ref 37.5–51)
HGB BLD-MCNC: 13.6 G/DL (ref 13–17.7)
IMM GRANULOCYTES # BLD: 0 X10E3/UL (ref 0–0.1)
IMM GRANULOCYTES NFR BLD: 0 %
LYMPHOCYTES # BLD AUTO: 3 X10E3/UL (ref 0.7–3.1)
LYMPHOCYTES NFR BLD AUTO: 37 %
MCH RBC QN AUTO: 29.2 PG (ref 26.6–33)
MCHC RBC AUTO-ENTMCNC: 33.9 G/DL (ref 31.5–35.7)
MCV RBC AUTO: 86 FL (ref 79–97)
MONOCYTES # BLD AUTO: 0.8 X10E3/UL (ref 0.1–0.9)
MONOCYTES NFR BLD AUTO: 9 %
NEUTROPHILS # BLD AUTO: 4.1 X10E3/UL (ref 1.4–7)
NEUTROPHILS NFR BLD AUTO: 49 %
PLATELET # BLD AUTO: 316 X10E3/UL (ref 150–379)
POTASSIUM SERPL-SCNC: 4.3 MMOL/L (ref 3.5–5.2)
PROT SERPL-MCNC: 7.2 G/DL (ref 6–8.5)
RBC # BLD AUTO: 4.66 X10E6/UL (ref 4.14–5.8)
SODIUM SERPL-SCNC: 141 MMOL/L (ref 134–144)
WBC # BLD AUTO: 8.3 X10E3/UL (ref 3.4–10.8)

## 2018-02-23 ENCOUNTER — OFFICE VISIT (OUTPATIENT)
Dept: FAMILY MEDICINE CLINIC | Age: 68
End: 2018-02-23

## 2018-02-23 VITALS
HEART RATE: 74 BPM | WEIGHT: 259.2 LBS | RESPIRATION RATE: 26 BRPM | BODY MASS INDEX: 35.11 KG/M2 | OXYGEN SATURATION: 95 % | TEMPERATURE: 97.2 F | HEIGHT: 72 IN | SYSTOLIC BLOOD PRESSURE: 154 MMHG | DIASTOLIC BLOOD PRESSURE: 84 MMHG

## 2018-02-23 DIAGNOSIS — J32.0 MAXILLARY SINUSITIS, UNSPECIFIED CHRONICITY: ICD-10-CM

## 2018-02-23 DIAGNOSIS — R68.89 FLU-LIKE SYMPTOMS: Primary | ICD-10-CM

## 2018-02-23 LAB
FLUAV+FLUBV AG NOSE QL IA.RAPID: NEGATIVE POS/NEG
FLUAV+FLUBV AG NOSE QL IA.RAPID: NEGATIVE POS/NEG
VALID INTERNAL CONTROL?: YES

## 2018-02-23 RX ORDER — BRIMONIDINE TARTRATE 2 MG/ML
SOLUTION/ DROPS OPHTHALMIC
Refills: 11 | COMMUNITY
Start: 2018-02-13 | End: 2019-10-07

## 2018-02-23 RX ORDER — MONTELUKAST SODIUM 10 MG/1
10 TABLET ORAL DAILY
COMMUNITY
Start: 2018-02-18

## 2018-02-23 RX ORDER — AMOXICILLIN 500 MG/1
500 CAPSULE ORAL 3 TIMES DAILY
Qty: 21 CAP | Refills: 0 | Status: SHIPPED | OUTPATIENT
Start: 2018-02-23 | End: 2018-03-02

## 2018-02-23 RX ORDER — PROMETHAZINE HYDROCHLORIDE AND DEXTROMETHORPHAN HYDROBROMIDE 6.25; 15 MG/5ML; MG/5ML
5 SYRUP ORAL
Qty: 180 ML | Refills: 1 | Status: SHIPPED | OUTPATIENT
Start: 2018-02-23 | End: 2018-03-02

## 2018-02-23 NOTE — PROGRESS NOTES
Chief Complaint   Patient presents with    Cold Symptoms     Pt has cough,congestion, runny nose, sore thoat, weakness,  bodyaches and chills.

## 2018-02-23 NOTE — MR AVS SNAPSHOT
[facility] 
 
 
 [address] [facility] [address] [address] 
[phone] Patient: Jack Tabor MRN: [id number] :1950 Visit Information Date & Time Provider Department Dept. Phone Encounter #  
 2018 11:15 AM Nate Morris [phone] [id number] Follow-up Instructions Return if symptoms worsen or fail to improve. Your Appointments 2018  3:15 PM  
ROUTINE CARE with Yeimi Correa MD  
[facility] [address] [facility]) Appt Note: routine follow up  
 [address] [facility] [place] [address] [phone] [address] [facility] [address] Upcoming Health Maintenance Date Due  
 GLAUCOMA SCREENING Q2Y 2017 MEDICARE YEARLY EXAM 2017 Pneumococcal 65+ Low/Medium Risk (2 of 2 - PPSV23) 1/15/2019 COLONOSCOPY 2024 DTaP/Tdap/Td series (2 - Td) 2026 Allergies as of 2018  Review Complete On: 2018 By: Yeimi Correa MD  
  
 Severity Noted Reaction Type Reactions Diclofenac  2010    Other (comments) Ringing in ears Propoxyphene-acetaminophen  2010    Rash  
 Sulfa (Sulfonamide Antibiotics)  2010    Shortness of Breath Pt states he is not allergic. Zithromax [Azithromycin]  2014    Rash Current Immunizations  Reviewed on 1/15/2018 Name Date Influenza Vaccine 10/20/2014 Influenza Vaccine PF 1/3/2014 Pneumococcal Conjugate (PCV-13) 1/15/2018 Not reviewed this visit You Were Diagnosed With   
  
 Codes Comments Flu-like symptoms    -  Primary ICD-10-CM: R68.89 ICD-9-CM: 780.99 Maxillary sinusitis, unspecified chronicity     ICD-10-CM: J32.0 ICD-9-CM: 473.0 Vitals BP Pulse Temp Resp Height(growth percentile) Weight(growth percentile) 154/84 (BP 1 Location: Left arm, BP Patient Position: Sitting) 74 97.2 °F (36.2 °C) (Oral) 26 6' (1.829 m) 259 lb 3.2 oz (117.6 kg) SpO2 BMI Smoking Status 95% 35.15 kg/m2 Never Smoker BMI and BSA Data Body Mass Index Body Surface Area  
 35.15 kg/m 2 2.44 m 2 Preferred Pharmacy Pharmacy Name Phone [facility]/PHARMACY #[id number]- [place], [place] - [address] [phone] Your Updated Medication List  
  
   
This list is accurate as of 2/23/18 11:44 AM.  Always use your most recent med list.  
  
  
  
  
 albuterol 90 mcg/actuation inhaler Commonly known as:  PROAIR HFA Take 2 Puffs by inhalation every six (6) hours as needed for Wheezing. * ALPHAGAN P 0.15 % ophthalmic solution Generic drug:  brimonidine Administer 1 Drop to both eyes three (3) times daily. Indications: Pt not taking this dose. * brimonidine 0.2 % ophthalmic solution Commonly known as:  ALPHAGAN  
INSTILL 1 DROP IN BOTH EYES 3 TIMES DAILY ALREX 0.2 % Drps Generic drug:  loteprednol etabonate Administer 1 Drop to both eyes four (4) times daily. amoxicillin 500 mg capsule Commonly known as:  AMOXIL Take 1 Cap by mouth three (3) times daily for 7 days. aspirin 81 mg tablet Take 81 mg by mouth. atorvastatin 80 mg tablet Commonly known as:  LIPITOR  
TAKE 1 TABLET BY MOUTH AT BEDTIME  
  
 AZOPT 1 % ophthalmic suspension Generic drug:  brinzolamide BETIMOL 0.5 % ophthalmic solution Generic drug:  timolol Administer 1 Drop to both eyes two (2) times a day. use in affected eye(s)  
  
 cyanocobalamin 500 mcg tablet Commonly known as:  VITAMIN B12 Take 1 Tab by mouth daily. dilTIAZem  mg ER capsule Commonly known as:  CARDIZEM CD Take 1 Cap by mouth daily. fexofenadine 180 mg tablet Commonly known as:  [place] Take  by mouth. hydroCHLOROthiazide 12.5 mg tablet Commonly known as:  HYDRODIURIL Take 1 Tab by mouth daily. ibuprofen 800 mg tablet Commonly known as:  MOTRIN  
TAKE 1 TABLET BY MOUTH EVERY 8 HOURS AS NEEDED FOR PAIN  
  
 LUMIGAN 0.01 % ophthalmic drops Generic drug:  bimatoprost  
  
 meclizine 12.5 mg tablet Commonly known as:  ANTIVERT Take 1 Tab by mouth three (3) times daily as needed. methazolAMIDE 50 mg tablet Commonly known as:  NEPTAZANE  
  
 montelukast 10 mg tablet Commonly known as:  SINGULAIR  
  
 multivitamin tablet Commonly known as:  ONE A DAY Take 1 Tab by mouth daily. prednisoLONE acetate 1 % ophthalmic suspension Commonly known as:  PRED FORTE INSTILL 1 DROP INTO RIGHT EYE 4 TIMES A DAY promethazine-dextromethorphan 6.25-15 mg/5 mL syrup Commonly known as:  PROMETHAZINE-DM Take 5 mL by mouth four (4) times daily as needed for Cough for up to 7 days. ramipril 10 mg capsule Commonly known as:  ALTACE  
TAKE 2 CAPSULES BY MOUTH EVERY DAY  
  
 SALINE NASAL 0.65 % nasal squeeze bottle Generic drug:  sodium chloride INHALE 2 SPRAYS IN EACH NOSTRIL 4 TIMES A DAY FOR 14 DAYS  
  
 sodium chloride irrigation 0.9 % irrigation IRRIGATE EACH NASAL CAVITY WITH 60CCS OF NORMAL SALINE SOLUTION DAILY. DISP: 1 LITER  
  
 triamcinolone acetonide 0.1 % topical cream  
Commonly known as:  KENALOG Apply  to affected area three (3) times daily as needed for Skin Irritation. * Notice: This list has 2 medication(s) that are the same as other medications prescribed for you. Read the directions carefully, and ask your doctor or other care provider to review them with you. Prescriptions Sent to Pharmacy Refills  
 amoxicillin (AMOXIL) 500 mg capsule 0 Sig: Take 1 Cap by mouth three (3) times daily for 7 days. Class: Normal  
 Pharmacy: [facility]/pharmacy #[id number] AT [address] [place] [address] Ph #: [phone]  Route: Oral  
 promethazine-dextromethorphan (PROMETHAZINE-DM) 6.25-15 mg/5 mL syrup 1 Sig: Take 5 mL by mouth four (4) times daily as needed for Cough for up to 7 days. Class: Normal  
 Pharmacy: [facility]/pharmacy #[id number] AT [address] [place] [address] #: [phone] Route: Oral  
  
We Performed the Following AMB POC MANNY INFLUENZA A/B TEST [24270 CPT(R)] Follow-up Instructions Return if symptoms worsen or fail to improve. Introducing [facility] SERVICES! Nicki Gardner introduces ChipIn patient portal. Now you can access parts of your medical record, email your doctor's office, and request medication refills online. 1. In your internet browser, go to [email or web address] 2. Click on the First Time User? Click Here link in the Sign In box. You will see the New Member Sign Up page. 3. Enter your ChipIn Access Code exactly as it appears below. You will not need to use this code after youve completed the sign-up process. If you do not sign up before the expiration date, you must request a new code. · ChipIn Access Code: [email or web address] Expires: 4/15/2018  3:07 PM 
 
4. Enter the last four digits of your Social Security Number (xxxx) and Date of Birth (mm/dd/yyyy) as indicated and click Submit. You will be taken to the next sign-up page. 5. Create a ChipIn ID. This will be your ChipIn login ID and cannot be changed, so think of one that is secure and easy to remember. 6. Create a ChipIn password. You can change your password at any time. 7. Enter your Password Reset Question and Answer. This can be used at a later time if you forget your password. 8. Enter your e-mail address. You will receive e-mail notification when new information is available in [address] 9. Click Sign Up. You can now view and download portions of your medical record. 10. Click the Download Summary menu link to download a portable copy of your medical information. If you have questions, please visit the Frequently Asked Questions section of the [employer] website. Remember, MeeGenius is NOT to be used for urgent needs. For medical emergencies, dial 911. Now available from your iPhone and Android! Please provide this summary of care documentation to your next provider. Your primary care clinician is listed as Jose Blair. If you have any questions after today's visit, please call [phone].

## 2018-02-23 NOTE — PROGRESS NOTES
HISTORY OF PRESENT ILLNESS  Corinna Montgomery is a 74 y.o. male. sor e throat congestion,fever and cjhills ,myalgias x 1 day  Cold Symptoms   The history is provided by the patient. This is a new problem. The current episode started yesterday. The problem occurs every few minutes. The cough is productive of sputum. Associated symptoms include chills, sweats, headaches, rhinorrhea, sore throat, myalgias and nausea. Review of Systems   Constitutional: Positive for chills. HENT: Positive for rhinorrhea and sore throat. Gastrointestinal: Positive for nausea. Musculoskeletal: Positive for myalgias. Neurological: Positive for headaches. Physical Exam   Constitutional: He appears well-developed and well-nourished. HENT:   Head: Normocephalic and atraumatic. Right Ear: Tympanic membrane and ear canal normal.   Left Ear: Tympanic membrane and ear canal normal.   Nose: Mucosal edema and rhinorrhea present. Right sinus exhibits maxillary sinus tenderness. Left sinus exhibits maxillary sinus tenderness. Mouth/Throat: Posterior oropharyngeal erythema present. Eyes: Pupils are equal, round, and reactive to light. Neck: Normal range of motion. Pulmonary/Chest: Effort normal and breath sounds normal.   Lymphadenopathy:     He has no cervical adenopathy. ASSESSMENT and PLAN  Diagnoses and all orders for this visit:    1. Flu-like symptoms  -     AMB POC MANNY INFLUENZA A/B TEST  -     amoxicillin (AMOXIL) 500 mg capsule; Take 1 Cap by mouth three (3) times daily for 7 days. -     promethazine-dextromethorphan (PROMETHAZINE-DM) 6.25-15 mg/5 mL syrup; Take 5 mL by mouth four (4) times daily as needed for Cough for up to 7 days. 2. Maxillary sinusitis, unspecified chronicity  -     amoxicillin (AMOXIL) 500 mg capsule; Take 1 Cap by mouth three (3) times daily for 7 days. -     promethazine-dextromethorphan (PROMETHAZINE-DM) 6.25-15 mg/5 mL syrup;  Take 5 mL by mouth four (4) times daily as needed for Cough for up to 7 days. Follow-up Disposition:  Return if symptoms worsen or fail to improve.

## 2018-03-30 DIAGNOSIS — I10 ESSENTIAL HYPERTENSION, BENIGN: ICD-10-CM

## 2018-03-30 RX ORDER — FUROSEMIDE 20 MG/1
TABLET ORAL
Qty: 30 TAB | Refills: 2 | Status: SHIPPED | OUTPATIENT
Start: 2018-03-30 | End: 2018-06-30 | Stop reason: SDUPTHER

## 2018-04-09 RX ORDER — RAMIPRIL 10 MG/1
CAPSULE ORAL
Qty: 60 CAP | Refills: 10 | Status: SHIPPED | OUTPATIENT
Start: 2018-04-09 | End: 2019-02-18 | Stop reason: SDUPTHER

## 2018-04-16 ENCOUNTER — OFFICE VISIT (OUTPATIENT)
Dept: FAMILY MEDICINE CLINIC | Age: 68
End: 2018-04-16

## 2018-04-16 VITALS
OXYGEN SATURATION: 96 % | HEART RATE: 76 BPM | WEIGHT: 256.4 LBS | SYSTOLIC BLOOD PRESSURE: 126 MMHG | DIASTOLIC BLOOD PRESSURE: 82 MMHG | BODY MASS INDEX: 34.73 KG/M2 | RESPIRATION RATE: 28 BRPM | TEMPERATURE: 98.3 F | HEIGHT: 72 IN

## 2018-04-16 DIAGNOSIS — K21.00 GASTROESOPHAGEAL REFLUX DISEASE WITH ESOPHAGITIS: ICD-10-CM

## 2018-04-16 DIAGNOSIS — J30.89 NON-SEASONAL ALLERGIC RHINITIS, UNSPECIFIED TRIGGER: ICD-10-CM

## 2018-04-16 DIAGNOSIS — M77.12 LATERAL EPICONDYLITIS OF LEFT ELBOW: Primary | ICD-10-CM

## 2018-04-16 DIAGNOSIS — I10 ESSENTIAL HYPERTENSION, BENIGN: ICD-10-CM

## 2018-04-16 RX ORDER — FAMOTIDINE 40 MG/1
40 TABLET, FILM COATED ORAL DAILY
Qty: 30 TAB | Refills: 1 | Status: SHIPPED | OUTPATIENT
Start: 2018-04-16 | End: 2018-06-18 | Stop reason: SDUPTHER

## 2018-04-16 RX ORDER — EPINEPHRINE 0.3 MG/.3ML
INJECTION SUBCUTANEOUS
Refills: 0 | COMMUNITY
Start: 2018-03-19

## 2018-04-16 NOTE — MR AVS SNAPSHOT
[facility] 
 
 
 [address] [address] 
[phone] Patient: Master Saavedra MRN: [id number] :1950 Visit Information Date & Time Provider Department Dept. Phone Encounter #  
 2018  3:15 PM Mesha Sutton [phone] [id number] Follow-up Instructions Return in about 3 months (around 2018). Upcoming Health Maintenance Date Due  
 GLAUCOMA SCREENING Q2Y 2017 MEDICARE YEARLY EXAM 3/14/2018 Pneumococcal 65+ Low/Medium Risk (2 of 2 - PPSV23) 1/15/2019 COLONOSCOPY 2024 DTaP/Tdap/Td series (2 - Td) 2026 Allergies as of 2018  Review Complete On: 2018 By: Janice Galo Severity Noted Reaction Type Reactions Diclofenac  2010    Other (comments) Ringing in ears Propoxyphene-acetaminophen  2010    Rash  
 Sulfa (Sulfonamide Antibiotics)  2010    Shortness of Breath Pt states he is not allergic. Zithromax [Azithromycin]  2014    Rash Current Immunizations  Reviewed on 1/15/2018 Name Date Influenza Vaccine 10/20/2014 Influenza Vaccine PF 1/3/2014 Pneumococcal Conjugate (PCV-13) 1/15/2018 Not reviewed this visit You Were Diagnosed With   
  
 Codes Comments Lateral epicondylitis of left elbow    -  Primary ICD-10-CM: M77.12 
ICD-9-CM: 726.32 Essential hypertension, benign     ICD-10-CM: I10 
ICD-9-CM: 401.1 Non-seasonal allergic rhinitis, unspecified trigger     ICD-10-CM: J30.89 ICD-9-CM: 477.8 Gastroesophageal reflux disease with esophagitis     ICD-10-CM: K21.0 ICD-9-CM: 530.11 Vitals BP Pulse Temp Resp Height(growth percentile) Weight(growth percentile) 126/82 (BP 1 Location: Left arm, BP Patient Position: Sitting) 76 98.3 °F (36.8 °C) (Oral) 28 6' (1.829 m) 256 lb 6.4 oz (116.3 kg) SpO2 BMI Smoking Status 96% 34.77 kg/m2 Never Smoker BMI and BSA Data Body Mass Index Body Surface Area 34.77 kg/m 2 2.43 m 2 Preferred Pharmacy Pharmacy Name Phone [facility]/PHARMACY #[id number]- [place] - [address] AT [address] [place] [address] [phone] Your Updated Medication List  
  
   
This list is accurate as of 4/16/18  3:49 PM.  Always use your most recent med list.  
  
  
  
  
 albuterol 90 mcg/actuation inhaler Commonly known as:  PROAIR HFA Take 2 Puffs by inhalation every six (6) hours as needed for Wheezing. * ALPHAGAN P 0.15 % ophthalmic solution Generic drug:  brimonidine Administer 1 Drop to both eyes three (3) times daily. Indications: Pt not taking this dose. * brimonidine 0.2 % ophthalmic solution Commonly known as:  ALPHAGAN  
INSTILL 1 DROP IN BOTH EYES 3 TIMES DAILY ALREX 0.2 % Drps Generic drug:  loteprednol etabonate Administer 1 Drop to both eyes four (4) times daily. aspirin 81 mg tablet Take 81 mg by mouth. atorvastatin 80 mg tablet Commonly known as:  LIPITOR  
TAKE 1 TABLET BY MOUTH AT BEDTIME  
  
 AZOPT 1 % ophthalmic suspension Generic drug:  brinzolamide BETIMOL 0.5 % ophthalmic solution Generic drug:  timolol Administer 1 Drop to both eyes two (2) times a day. use in affected eye(s)  
  
 cyanocobalamin 500 mcg tablet Commonly known as:  VITAMIN B12 Take 1 Tab by mouth daily. dilTIAZem  mg ER capsule Commonly known as:  CARDIZEM CD Take 1 Cap by mouth daily. EPINEPHrine 0.3 mg/0.3 mL injection Commonly known as:  Matthew Mask INJECT INTRAMUSCULARLY ONCE. MAY REPEAT IF NECESSARY  
  
 fexofenadine 180 mg tablet Commonly known as:  Sherrel Olar Take  by mouth. furosemide 20 mg tablet Commonly known as:  LASIX TAKE 1 TABLET BY MOUTH EVERY DAY  
  
 hydroCHLOROthiazide 12.5 mg tablet Commonly known as:  HYDRODIURIL Take 1 Tab by mouth daily. ibuprofen 800 mg tablet Commonly known as:  MOTRIN  
TAKE 1 TABLET BY MOUTH EVERY 8 HOURS AS NEEDED FOR PAIN  
  
 LUMIGAN 0.01 % ophthalmic drops Generic drug:  bimatoprost  
  
 meclizine 12.5 mg tablet Commonly known as:  ANTIVERT Take 1 Tab by mouth three (3) times daily as needed. methazolAMIDE 50 mg tablet Commonly known as:  NEPTAZANE  
  
 montelukast 10 mg tablet Commonly known as:  SINGULAIR  
  
 multivitamin tablet Commonly known as:  ONE A DAY Take 1 Tab by mouth daily. prednisoLONE acetate 1 % ophthalmic suspension Commonly known as:  PRED FORTE INSTILL 1 DROP INTO RIGHT EYE 4 TIMES A DAY  
  
 ramipril 10 mg capsule Commonly known as:  ALTACE  
TAKE 2 CAPSULES BY MOUTH EVERY DAY  
  
 SALINE NASAL 0.65 % nasal squeeze bottle Generic drug:  sodium chloride INHALE 2 SPRAYS IN EACH NOSTRIL 4 TIMES A DAY FOR 14 DAYS  
  
 sodium chloride irrigation 0.9 % irrigation IRRIGATE EACH NASAL CAVITY WITH 60CCS OF NORMAL SALINE SOLUTION DAILY. DISP: 1 LITER  
  
 triamcinolone acetonide 0.1 % topical cream  
Commonly known as:  KENALOG Apply  to affected area three (3) times daily as needed for Skin Irritation. * Notice: This list has 2 medication(s) that are the same as other medications prescribed for you. Read the directions carefully, and ask your doctor or other care provider to review them with you. Follow-up Instructions Return in about 3 months (around 7/16/2018). Patient Instructions Tennis Elbow: Exercises Your Care Instructions Here are some examples of typical rehabilitation exercises for your condition. Start each exercise slowly. Ease off the exercise if you start to have pain. Your doctor or physical therapist will tell you when you can start these exercises and which ones will work best for you. How to do the exercises Wrist flexor stretch 1. Extend your arm in front of you with your palm up. 2. Bend your wrist, pointing your hand toward the floor. 3. With your other hand, gently bend your wrist farther until you feel a mild to moderate stretch in your forearm. 4. Hold for at least 15 to 30 seconds. Repeat 2 to 4 times. Wrist extensor stretch 1. Repeat steps 1 to 4 of the stretch above but begin with your extended hand palm down. Ball or sock squeeze 1. Hold a tennis ball (or a rolled-up sock) in your hand. 2. Make a fist around the ball (or sock) and squeeze. 3. Hold for about 6 seconds, and then relax for up to 10 seconds. 4. Repeat 8 to 12 times. 5. Switch the ball (or sock) to your other hand and do 8 to 12 times. Wrist deviation 1. Sit so that your arm is supported but your hand hangs off the edge of a flat surface, such as a table. 2. Hold your hand out like you are shaking hands with someone. 3. Move your hand up and down. 4. Repeat this motion 8 to 12 times. 5. Switch arms. 6. Try to do this exercise twice with each hand. Wrist curls 1. Place your forearm on a table with your hand hanging over the edge of the table, palm up. 2. Place a 1- to 2-pound weight in your hand. This may be a dumbbell, a can of food, or a filled water bottle. 3. Slowly raise and lower the weight while keeping your forearm on the table and your palm facing up. 4. Repeat this motion 8 to 12 times. 5. Switch arms, and do steps 1 through 4. 
6. Repeat with your hand facing down toward the floor. Switch arms. Biceps curls 1. Sit leaning forward with your legs slightly spread and your left hand on your left thigh. 2. Place your right elbow on your right thigh, and hold the weight with your forearm horizontal. 
3. Slowly curl the weight up and toward your chest. 
4. Repeat this motion 8 to 12 times. 5. Switch arms, and do steps 1 through 4. Follow-up care is a key part of your treatment and safety.  Be sure to make and go to all appointments, and call your doctor if you are having problems. It's also a good idea to know your test results and keep a list of the medicines you take. Where can you learn more? Go to http://treasure-luis.info/. Enter F743 in the search box to learn more about \"Tennis Elbow: Exercises. \" Current as of: March 21, 2017 Content Version: 11.4 © 6776-1992 [employer]. Care instructions adapted under license by [employer] (which disclaims liability or warranty for this information). If you have questions about a medical condition or this instruction, always ask your healthcare professional. [address] any warranty or liability for your use of this information. Introducing [facility]! [employer] introduces MSI patient portal. Now you can access parts of your medical record, email your doctor's office, and request medication refills online. 1. In your internet browser, go to [email or web address] 2. Click on the First Time User? Click Here link in the Sign In box. You will see the New Member Sign Up page. 3. Enter your MSI Access Code exactly as it appears below. You will not need to use this code after youve completed the sign-up process. If you do not sign up before the expiration date, you must request a new code. · MSI Access Code: [email or web address] Expires: 7/15/2018  3:49 PM 
 
4. Enter the last four digits of your Social Security Number (xxxx) and Date of Birth (mm/dd/yyyy) as indicated and click Submit. You will be taken to the next sign-up page. 5. Create a Zeta [employer] ID. This will be your MSI login ID and cannot be changed, so think of one that is secure and easy to remember. 6. Create a MSI password. You can change your password at any time. 7. Enter your Password Reset Question and Answer. This can be used at a later time if you forget your password. 8. Enter your e-mail address.  You will receive e-mail notification when new information is available in Dress Code. 9. Click Sign Up. You can now view and download portions of your medical record. 10. Click the Download Summary menu link to download a portable copy of your medical information. If you have questions, please visit the Frequently Asked Questions section of the Dress Code website. Remember, [employer] Code is NOT to be used for urgent needs. For medical emergencies, dial 911. Now available from your iPhone and Android! Please provide this summary of care documentation to your next provider. Your primary care clinician is listed as Jessa Barcenas. If you have any questions after today's visit, please call [phone].

## 2018-04-16 NOTE — PATIENT INSTRUCTIONS
Tennis Elbow: Exercises  Your Care Instructions  Here are some examples of typical rehabilitation exercises for your condition. Start each exercise slowly. Ease off the exercise if you start to have pain. Your doctor or physical therapist will tell you when you can start these exercises and which ones will work best for you. How to do the exercises  Wrist flexor stretch    1. Extend your arm in front of you with your palm up. 2. Bend your wrist, pointing your hand toward the floor. 3. With your other hand, gently bend your wrist farther until you feel a mild to moderate stretch in your forearm. 4. Hold for at least 15 to 30 seconds. Repeat 2 to 4 times. Wrist extensor stretch    1. Repeat steps 1 to 4 of the stretch above but begin with your extended hand palm down. Ball or sock squeeze    1. Hold a tennis ball (or a rolled-up sock) in your hand. 2. Make a fist around the ball (or sock) and squeeze. 3. Hold for about 6 seconds, and then relax for up to 10 seconds. 4. Repeat 8 to 12 times. 5. Switch the ball (or sock) to your other hand and do 8 to 12 times. Wrist deviation    1. Sit so that your arm is supported but your hand hangs off the edge of a flat surface, such as a table. 2. Hold your hand out like you are shaking hands with someone. 3. Move your hand up and down. 4. Repeat this motion 8 to 12 times. 5. Switch arms. 6. Try to do this exercise twice with each hand. Wrist curls    1. Place your forearm on a table with your hand hanging over the edge of the table, palm up. 2. Place a 1- to 2-pound weight in your hand. This may be a dumbbell, a can of food, or a filled water bottle. 3. Slowly raise and lower the weight while keeping your forearm on the table and your palm facing up. 4. Repeat this motion 8 to 12 times. 5. Switch arms, and do steps 1 through 4.  6. Repeat with your hand facing down toward the floor. Switch arms. Biceps curls    1.  Sit leaning forward with your legs slightly spread and your left hand on your left thigh. 2. Place your right elbow on your right thigh, and hold the weight with your forearm horizontal.  3. Slowly curl the weight up and toward your chest.  4. Repeat this motion 8 to 12 times. 5. Switch arms, and do steps 1 through 4. Follow-up care is a key part of your treatment and safety. Be sure to make and go to all appointments, and call your doctor if you are having problems. It's also a good idea to know your test results and keep a list of the medicines you take. Where can you learn more? Go to http://treasure-luis.info/. Enter C755 in the search box to learn more about \"Tennis Elbow: Exercises. \"  Current as of: March 21, 2017  Content Version: 11.4  © 2366-3250 [employer]. Care instructions adapted under license by [employer] (which disclaims liability or warranty for this information). If you have questions about a medical condition or this instruction, always ask your healthcare professional. [address] any warranty or liability for your use of this information.

## 2018-04-17 NOTE — PROGRESS NOTES
HISTORY OF PRESENT ILLNESS  Master Saavedra is a 74 y.o. male. 2 weeks l elbow pain,no apparent injury ,though lifting wts  Hypertension    The history is provided by the patient. This is a chronic problem. The problem has not changed since onset. Pertinent negatives include no chest pain, no palpitations, no malaise/fatigue and no peripheral edema. Elbow Pain    This is a new problem. The current episode started more than 1 week ago. The problem occurs daily. The problem has not changed since onset. The pain is present in the left elbow. The pain is at a severity of 4/10. The pain is mild. Associated symptoms include tingling. Review of Systems   Constitutional: Negative for fever and malaise/fatigue. Cardiovascular: Negative for chest pain and palpitations. Musculoskeletal: Positive for joint pain. Negative for myalgias. Neurological: Positive for tingling. Psychiatric/Behavioral: Negative for substance abuse. Physical Exam   Constitutional: He appears well-developed and well-nourished. HENT:   Head: Normocephalic and atraumatic. Right Ear: External ear normal.   Left Ear: External ear normal.   Nose: Nose normal.   Mouth/Throat: Oropharynx is clear and moist.   Cardiovascular: Normal rate and regular rhythm. Pulmonary/Chest: Effort normal and breath sounds normal.   Abdominal: Soft. Bowel sounds are normal.   Musculoskeletal:        Left elbow: He exhibits normal range of motion, no swelling and no deformity. Tenderness found. Lateral epicondyle tenderness noted. Skin: Skin is warm and dry. ASSESSMENT and PLAN  Diagnoses and all orders for this visit:    1. Lateral epicondylitis of left elbowrecommend icing,rest ,exercise reviewed    2. Essential hypertension, benign,controlled    3. Non-seasonal allergic rhinitis, unspecified trigger    4. Gastroesophageal reflux disease with esophagitis  -     famotidine (PEPCID) 40 mg tablet; Take 1 Tab by mouth daily.       Follow-up Disposition:  Return in about 3 months (around 7/16/2018).

## 2018-05-10 DIAGNOSIS — I10 ESSENTIAL HYPERTENSION, BENIGN: ICD-10-CM

## 2018-05-10 RX ORDER — DILTIAZEM HYDROCHLORIDE 240 MG/1
CAPSULE, COATED, EXTENDED RELEASE ORAL
Qty: 30 CAP | Refills: 11 | Status: SHIPPED | OUTPATIENT
Start: 2018-05-10 | End: 2019-04-18 | Stop reason: SDUPTHER

## 2018-06-10 RX ORDER — ATORVASTATIN CALCIUM 80 MG/1
TABLET, FILM COATED ORAL
Qty: 30 TAB | Refills: 5 | Status: SHIPPED | OUTPATIENT
Start: 2018-06-10 | End: 2018-11-21 | Stop reason: SDUPTHER

## 2018-06-18 DIAGNOSIS — K21.00 GASTROESOPHAGEAL REFLUX DISEASE WITH ESOPHAGITIS: ICD-10-CM

## 2018-06-18 RX ORDER — FAMOTIDINE 40 MG/1
TABLET, FILM COATED ORAL
Qty: 30 TAB | Refills: 1 | Status: SHIPPED | OUTPATIENT
Start: 2018-06-18 | End: 2018-08-14 | Stop reason: SDUPTHER

## 2018-06-30 DIAGNOSIS — I10 ESSENTIAL HYPERTENSION, BENIGN: ICD-10-CM

## 2018-07-01 RX ORDER — FUROSEMIDE 20 MG/1
TABLET ORAL
Qty: 30 TAB | Refills: 2 | Status: SHIPPED | OUTPATIENT
Start: 2018-07-01 | End: 2018-07-16 | Stop reason: SINTOL

## 2018-07-16 ENCOUNTER — OFFICE VISIT (OUTPATIENT)
Dept: FAMILY MEDICINE CLINIC | Age: 68
End: 2018-07-16

## 2018-07-16 VITALS
HEART RATE: 90 BPM | BODY MASS INDEX: 34.73 KG/M2 | DIASTOLIC BLOOD PRESSURE: 84 MMHG | RESPIRATION RATE: 22 BRPM | HEIGHT: 72 IN | SYSTOLIC BLOOD PRESSURE: 142 MMHG | TEMPERATURE: 98.5 F | OXYGEN SATURATION: 97 % | WEIGHT: 256.4 LBS

## 2018-07-16 DIAGNOSIS — M15.9 PRIMARY OSTEOARTHRITIS INVOLVING MULTIPLE JOINTS: ICD-10-CM

## 2018-07-16 DIAGNOSIS — Z00.00 MEDICARE ANNUAL WELLNESS VISIT, INITIAL: Primary | ICD-10-CM

## 2018-07-16 DIAGNOSIS — E78.2 MIXED HYPERLIPIDEMIA: ICD-10-CM

## 2018-07-16 DIAGNOSIS — K21.00 GASTROESOPHAGEAL REFLUX DISEASE WITH ESOPHAGITIS: ICD-10-CM

## 2018-07-16 DIAGNOSIS — I10 ESSENTIAL HYPERTENSION, BENIGN: ICD-10-CM

## 2018-07-16 DIAGNOSIS — R35.1 NOCTURIA: ICD-10-CM

## 2018-07-16 RX ORDER — IBUPROFEN 800 MG/1
TABLET ORAL
Qty: 50 TAB | Refills: 3 | Status: SHIPPED | OUTPATIENT
Start: 2018-07-16 | End: 2018-12-07 | Stop reason: SDUPTHER

## 2018-07-16 NOTE — PROGRESS NOTES
This is a \"Welcome to [facility]"  Initial Preventive Physical Examination (IPPE) providing Personalized Prevention Plan Services (Performed in the first 12 months of enrollment)  F/U HBP,Chol ,AR;doing well,no c/o  I have reviewed the patient's medical history in detail and updated the computerized patient record. History     Past Medical History:   Diagnosis Date    Allergic rhinitis, cause unspecified 11/4/2010    Asthma 11/4/2010    Cervical radiculitis 11/9/2012    Encounter for long-term (current) use of other medications 11/26/2010    Essential hypertension, benign 11/4/2010    Glaucoma 11/4/2010    Hypertension     Mixed hyperlipidemia 5/27/2011    Nocturia 11/9/2012    OA (osteoarthritis) 11/26/2010      Past Surgical History:   Procedure Laterality Date    HX COLONOSCOPY      NASAL SCOPY,OPEN MAXILL SINUS       Current Outpatient Prescriptions   Medication Sig Dispense Refill    OTHER       famotidine (PEPCID) 40 mg tablet TAKE 1 TABLET BY MOUTH EVERY DAY 30 Tab 1    atorvastatin (LIPITOR) 80 mg tablet TAKE 1 TABLET BY MOUTH AT BEDTIME 30 Tab 5    dilTIAZem CD (CARDIZEM CD) 240 mg ER capsule TAKE 1 CAP BY MOUTH DAILY. 30 Cap 11    ramipril (ALTACE) 10 mg capsule TAKE 2 CAPSULES BY MOUTH EVERY DAY 60 Cap 10    brimonidine (ALPHAGAN) 0.2 % ophthalmic solution INSTILL 1 DROP IN BOTH EYES 3 TIMES DAILY  11    montelukast (SINGULAIR) 10 mg tablet       hydroCHLOROthiazide (HYDRODIURIL) 12.5 mg tablet Take 1 Tab by mouth daily. 30 Tab 11    ibuprofen (MOTRIN) 800 mg tablet TAKE 1 TABLET BY MOUTH EVERY 8 HOURS AS NEEDED FOR PAIN 50 Tab 1    meclizine (ANTIVERT) 12.5 mg tablet Take 1 Tab by mouth three (3) times daily as needed. 30 Tab 1    albuterol (PROAIR HFA) 90 mcg/actuation inhaler Take 2 Puffs by inhalation every six (6) hours as needed for Wheezing. 1 Inhaler 11    cyanocobalamin (VITAMIN B12) 500 mcg tablet Take 1 Tab by mouth daily.  100 Tab 3    triamcinolone acetonide (KENALOG) 0.1 % topical cream Apply  to affected area three (3) times daily as needed for Skin Irritation. 85 g 2    fexofenadine (ALLEGRA) 180 mg tablet Take  by mouth.  multivitamin (ONE A DAY) tablet Take 1 Tab by mouth daily.  prednisoLONE acetate (PRED FORTE) 1 % ophthalmic suspension INSTILL 1 DROP INTO RIGHT EYE 4 TIMES A DAY  1    methazolAMIDE (NEPTAZANE) 50 mg tablet       sodium chloride irrigation 0.9 % irrigation IRRIGATE EACH NASAL CAVITY WITH 60CCS OF NORMAL SALINE SOLUTION DAILY. DISP: 1 LITER  10    SALINE NASAL 0.65 % nasal spray INHALE 2 SPRAYS IN EACH NOSTRIL 4 TIMES A DAY FOR 14 DAYS  2    LUMIGAN 0.01 % ophthalmic drops   11    AZOPT 1 % ophthalmic suspension   11    loteprednol etabonate (ALREX) 0.2 % drps Administer 1 Drop to both eyes four (4) times daily.  aspirin 81 mg tablet Take 81 mg by mouth.  timolol (BETIMOL) 0.5 % ophthalmic solution Administer 1 Drop to both eyes two (2) times a day. use in affected eye(s)       brimonidine (ALPHAGAN P) 0.15 % ophthalmic solution Administer 1 Drop to both eyes three (3) times daily. Indications: Pt not taking this dose.  EPINEPHrine (EPIPEN) 0.3 mg/0.3 mL injection INJECT INTRAMUSCULARLY ONCE. MAY REPEAT IF NECESSARY  0     Allergies   Allergen Reactions    Diclofenac Other (comments)     Ringing in ears    Propoxyphene-Acetaminophen Rash    Sulfa (Sulfonamide Antibiotics) Shortness of Breath     Pt states he is not allergic.     Zithromax [Azithromycin] Rash     Family History   Problem Relation Age of Onset    Heart Disease Mother     Hypertension Mother     Heart Disease Father     No Known Problems Brother     Cancer Brother      Social History   Substance Use Topics    Smoking status: Never Smoker    Smokeless tobacco: Never Used    Alcohol use No     Diet, Lifestyle: No special diet    Exercise level: moderately active    Depression Risk Screen     PHQ over the last two weeks 4/16/2018   Little interest or pleasure in doing things Not at all   Feeling down, depressed or hopeless Not at all   Total Score PHQ 2 0     Alcohol Risk Screen   You do not drink alcohol or very rarely. Functional Ability and Level of Safety   Hearing Loss  Hearing is good. Vision Screening  Vision is good. No exam data present      Activities of Daily Living  The home contains: no safety equipment. Patient does total self care    Fall Risk Screen  Fall Risk Assessment, last 12 mths 4/16/2018   Able to walk? Yes   Fall in past 12 months?  No       Abuse Screen  Patient is not abused    Screening EKG   EKG order placed: No      Review of Systems - General ROS: negative  ENT ROS: positive for - nasal congestion  Respiratory ROS: no cough, shortness of breath, or wheezing  Cardiovascular ROS: no chest pain or dyspnea on exertion  Gastrointestinal ROS: no abdominal pain, change in bowel habits, or black or bloody stools  Genito-Urinary ROS: no dysuria, trouble voiding, or hematuria  Musculoskeletal ROS: negative      General appearance - alert, well appearing, and in no distress  Mental status - alert, oriented to person, place, and time  Eyes - pupils equal and reactive, extraocular eye movements intact  Ears - bilateral TM's and external ear canals normal  Nose - normal and patent, no erythema, discharge or polyps  Mouth - mucous membranes moist, pharynx normal without lesions  Neck - supple, no significant adenopathy, carotids upstroke normal bilaterally, no bruits, thyroid exam: thyroid is normal in size without nodules or tenderness  Chest - clear to auscultation, no wheezes, rales or rhonchi, symmetric air entry  Heart - normal rate, regular rhythm, normal S1, S2, no murmurs, rubs, clicks or gallops  Abdomen - soft, nontender, nondistended, no masses or organomegaly  Musculoskeletal - no joint tenderness, deformity or swelling  Extremities - peripheral pulses normal, no pedal edema, no clubbing or cyanosis      Patient Care Team   Patient Care Team:  Demetrius Lubin MD as PCP - Katelyn Ireland RN as Ambulatory Care Navigator  Ary Hoang MD as Physician (Ophthalmology)     End of Life Planning   Advanced care planning directives were not discussed with the patient and/or family/caregiver. Assessment/Plan   Education and counseling provided:  Are appropriate based on today's review and evaluation    Diagnoses and all orders for this visit:    1. Medicare annual wellness visit, initial    2. Gastroesophageal reflux disease with esophagitis    3. Essential hypertension, benign  -     METABOLIC PANEL, COMPREHENSIVE  -     CBC WITH AUTOMATED DIFF  -     AMB POC URINALYSIS DIP STICK AUTO W/O MICRO    4. Mixed hyperlipidemia  -     CHOLESTEROL, TOTAL    5. Nocturia  -     PROSTATE SPECIFIC AG    . Continue current meds and treatments.       Health Maintenance Due   Topic Date Due    GLAUCOMA SCREENING Q2Y  11/08/2017    MEDICARE YEARLY EXAM  03/14/2018

## 2018-07-16 NOTE — MR AVS SNAPSHOT
[facility] 
 
 
 [facility] [id number] 
[phone] Patient: Tru Sims MRN: [id number] :1950 Visit Information Date & Time Provider Department Dept. Phone Encounter #  
 2018  3:15 PM Nate Nair Sin [phone] [id number] Follow-up Instructions Return in about 4 weeks (around 2018). Upcoming Health Maintenance Date Due  
 GLAUCOMA SCREENING Q2Y 2017 MEDICARE YEARLY EXAM 3/14/2018 Influenza Age 5 to Adult 2018 Pneumococcal 65+ Low/Medium Risk (2 of 2 - PPSV23) 1/15/2019 COLONOSCOPY 2024 DTaP/Tdap/Td series (2 - Td) 2026 Allergies as of 2018  Review Complete On: 2018 By: José Miguel Maynard MD  
  
 Severity Noted Reaction Type Reactions Diclofenac  2010    Other (comments) Ringing in ears Propoxyphene-acetaminophen  2010    Rash  
 Sulfa (Sulfonamide Antibiotics)  2010    Shortness of Breath Pt states he is not allergic. Zithromax [Azithromycin]  2014    Rash Current Immunizations  Reviewed on 1/15/2018 Name Date Influenza Vaccine 10/20/2014 Influenza Vaccine PF 1/3/2014 Pneumococcal Conjugate (PCV-13) 1/15/2018 Not reviewed this visit You Were Diagnosed With   
  
 Codes Comments Medicare annual wellness visit, initial    -  Primary ICD-10-CM: Z00.00 ICD-9-CM: V70.0 Gastroesophageal reflux disease with esophagitis     ICD-10-CM: K21.0 ICD-9-CM: 530.11 Essential hypertension, benign     ICD-10-CM: I10 
ICD-9-CM: 401.1 Mixed hyperlipidemia     ICD-10-CM: E78.2 ICD-9-CM: 272.2 Nocturia     ICD-10-CM: R35.1 ICD-9-CM: 788.43 Primary osteoarthritis involving multiple joints     ICD-10-CM: M15.0 ICD-9-CM: 715.09 Vitals BP Pulse Temp Resp Height(growth percentile) Weight(growth percentile) 142/84 (BP 1 Location: Left arm, BP Patient Position: Sitting) 90 98.5 °F (36.9 °C) (Oral) 22 6' (1.829 m) 256 lb 6.4 oz (116.3 kg) SpO2 BMI Smoking Status 97% 34.77 kg/m2 Never Smoker Vitals History BMI and BSA Data Body Mass Index Body Surface Area 34.77 kg/m 2 2.43 m 2 Preferred Pharmacy Pharmacy Name Phone [id number]- [place], [place] - [address] [place] AT [address] [place] [address] [phone] Your Updated Medication List  
  
   
This list is accurate as of 7/16/18  3:41 PM.  Always use your most recent med list.  
  
  
  
  
 albuterol 90 mcg/actuation inhaler Commonly known as:  PROAIR HFA Take 2 Puffs by inhalation every six (6) hours as needed for Wheezing. * ALPHAGAN P 0.15 % ophthalmic solution Generic drug:  brimonidine Administer 1 Drop to both eyes three (3) times daily. Indications: Pt not taking this dose. * brimonidine 0.2 % ophthalmic solution Commonly known as:  ALPHAGAN  
INSTILL 1 DROP IN BOTH EYES 3 TIMES DAILY ALREX 0.2 % Drps Generic drug:  loteprednol etabonate Administer 1 Drop to both eyes four (4) times daily. aspirin 81 mg tablet Take 81 mg by mouth. atorvastatin 80 mg tablet Commonly known as:  LIPITOR  
TAKE 1 TABLET BY MOUTH AT BEDTIME  
  
 AZOPT 1 % ophthalmic suspension Generic drug:  brinzolamide BETIMOL 0.5 % ophthalmic solution Generic drug:  timolol Administer 1 Drop to both eyes two (2) times a day. use in affected eye(s)  
  
 cyanocobalamin 500 mcg tablet Commonly known as:  VITAMIN B12 Take 1 Tab by mouth daily. dilTIAZem  mg ER capsule Commonly known as:  CARDIZEM CD  
TAKE 1 CAP BY MOUTH DAILY. EPINEPHrine 0.3 mg/0.3 mL injection Commonly known as:  Len Faria INJECT INTRAMUSCULARLY ONCE. MAY REPEAT IF NECESSARY  
  
 famotidine 40 mg tablet Commonly known as:  PEPCID  
TAKE 1 TABLET BY MOUTH EVERY DAY  
  
 fexofenadine 180 mg tablet Commonly known as:  Mojgan Christina Take  by mouth. hydroCHLOROthiazide 12.5 mg tablet Commonly known as:  HYDRODIURIL Take 1 Tab by mouth daily. ibuprofen 800 mg tablet Commonly known as:  MOTRIN  
TAKE 1 TABLET BY MOUTH EVERY 8 HOURS AS NEEDED FOR PAIN  
  
 LUMIGAN 0.01 % ophthalmic drops Generic drug:  bimatoprost  
  
 meclizine 12.5 mg tablet Commonly known as:  ANTIVERT Take 1 Tab by mouth three (3) times daily as needed. methazolAMIDE 50 mg tablet Commonly known as:  NEPTAZANE  
  
 montelukast 10 mg tablet Commonly known as:  SINGULAIR  
  
 multivitamin tablet Commonly known as:  ONE A DAY Take 1 Tab by mouth daily. OTHER  
  
 prednisoLONE acetate 1 % ophthalmic suspension Commonly known as:  PRED FORTE INSTILL 1 DROP INTO RIGHT EYE 4 TIMES A DAY  
  
 ramipril 10 mg capsule Commonly known as:  ALTACE  
TAKE 2 CAPSULES BY MOUTH EVERY DAY  
  
 SALINE NASAL 0.65 % nasal squeeze bottle Generic drug:  sodium chloride INHALE 2 SPRAYS IN EACH NOSTRIL 4 TIMES A DAY FOR 14 DAYS  
  
 sodium chloride irrigation 0.9 % irrigation IRRIGATE EACH NASAL CAVITY WITH 60CCS OF NORMAL SALINE SOLUTION DAILY. DISP: 1 LITER  
  
 triamcinolone acetonide 0.1 % topical cream  
Commonly known as:  KENALOG Apply  to affected area three (3) times daily as needed for Skin Irritation. * Notice: This list has 2 medication(s) that are the same as other medications prescribed for you. Read the directions carefully, and ask your doctor or other care provider to review them with you. Prescriptions Sent to Pharmacy Refills  
 ibuprofen (MOTRIN) 800 mg tablet 3 Sig: TAKE 1 TABLET BY MOUTH EVERY 8 HOURS AS NEEDED FOR PAIN Class: Normal  
 Pharmacy: [facility]/pharmacy #[id number]- [place], [place] - [address] [place] [address] AT [address] [place] [address] Ph #: [phone] We Performed the Following AMB POC URINALYSIS DIP STICK AUTO W/O MICRO [33760 CPT(R)] CBC WITH AUTOMATED DIFF [91806 CPT(R)] CHOLESTEROL, TOTAL [27671 CPT(R)] METABOLIC PANEL, COMPREHENSIVE [51146 CPT(R)] PSA, DIAGNOSTIC (PROSTATE SPECIFIC AG) [id number] CPT(R)] Follow-up Instructions Return in about 4 weeks (around 8/13/2018). Introducing [facility]! Ghislaine Gaston introduces [employer] patient portal. Now you can access parts of your medical record, email your doctor's office, and request medication refills online. 1. In your internet browser, go to [email or web address] 2. Click on the First Time User? Click Here link in the Sign In box. You will see the New Member Sign Up page. 3. Enter your [employer] Access Code exactly as it appears below. You will not need to use this code after youve completed the sign-up process. If you do not sign up before the expiration date, you must request a new code. · [employer] Access Code: [email or web address] Expires: 10/14/2018  3:13 PM 
 
4. Enter the last four digits of your Social Security Number (xxxx) and Date of Birth (mm/dd/yyyy) as indicated and click Submit. You will be taken to the next sign-up page. 5. Create a [employer] ID. This will be your [employer] login ID and cannot be changed, so think of one that is secure and easy to remember. 6. Create a [employer] password. You can change your password at any time. 7. Enter your Password Reset Question and Answer. This can be used at a later time if you forget your password. 8. Enter your e-mail address. You will receive e-mail notification when new information is available in [address] 9. Click Sign Up. You can now view and download portions of your medical record. 10. Click the Download Summary menu link to download a portable copy of your medical information.  
 
If you have questions, please visit the Frequently Asked Questions section of the [employer]. Remember, [employer] is NOT to be used for urgent needs. For medical emergencies, dial 911. Now available from your iPhone and Android! Please provide this summary of care documentation to your next provider. Your primary care clinician is listed as Janette Fernandez. If you have any questions after today's visit, please call [phone].

## 2018-07-16 NOTE — PROGRESS NOTES
.  Chief Complaint   Patient presents with    Diabetes     F/U on diabetes.  Hypertension     F/U on BP.  Cholesterol Problem     F/U on cholesterol.

## 2018-08-14 DIAGNOSIS — K21.00 GASTROESOPHAGEAL REFLUX DISEASE WITH ESOPHAGITIS: ICD-10-CM

## 2018-08-14 RX ORDER — FAMOTIDINE 40 MG/1
TABLET, FILM COATED ORAL
Qty: 30 TAB | Refills: 1 | Status: SHIPPED | OUTPATIENT
Start: 2018-08-14 | End: 2018-10-14 | Stop reason: SDUPTHER

## 2018-09-07 ENCOUNTER — OFFICE VISIT (OUTPATIENT)
Dept: FAMILY MEDICINE CLINIC | Age: 68
End: 2018-09-07

## 2018-09-07 ENCOUNTER — HOSPITAL ENCOUNTER (OUTPATIENT)
Dept: LAB | Age: 68
Discharge: HOME | End: 2018-09-07
Payer: MEDICARE

## 2018-09-07 VITALS
HEIGHT: 72 IN | SYSTOLIC BLOOD PRESSURE: 142 MMHG | OXYGEN SATURATION: 96 % | WEIGHT: 254.4 LBS | BODY MASS INDEX: 34.46 KG/M2 | HEART RATE: 67 BPM | DIASTOLIC BLOOD PRESSURE: 88 MMHG | TEMPERATURE: 98.1 F | RESPIRATION RATE: 18 BRPM

## 2018-09-07 DIAGNOSIS — R35.1 NOCTURIA: ICD-10-CM

## 2018-09-07 DIAGNOSIS — E78.2 MIXED HYPERLIPIDEMIA: Primary | ICD-10-CM

## 2018-09-07 DIAGNOSIS — J30.89 NON-SEASONAL ALLERGIC RHINITIS, UNSPECIFIED TRIGGER: ICD-10-CM

## 2018-09-07 DIAGNOSIS — I10 ESSENTIAL HYPERTENSION, BENIGN: ICD-10-CM

## 2018-09-07 DIAGNOSIS — K21.00 GASTROESOPHAGEAL REFLUX DISEASE WITH ESOPHAGITIS: ICD-10-CM

## 2018-09-07 PROCEDURE — 80053 COMPREHEN METABOLIC PANEL: CPT

## 2018-09-07 PROCEDURE — 85025 COMPLETE CBC W/AUTO DIFF WBC: CPT

## 2018-09-07 PROCEDURE — 36415 COLL VENOUS BLD VENIPUNCTURE: CPT

## 2018-09-07 PROCEDURE — 82550 ASSAY OF CK (CPK): CPT

## 2018-09-07 PROCEDURE — 84153 ASSAY OF PSA TOTAL: CPT

## 2018-09-07 PROCEDURE — 80061 LIPID PANEL: CPT

## 2018-09-07 NOTE — MR AVS SNAPSHOT
[address] [facility], [place] 
 
 
 [address] [facility] [place] [address] [id number] [phone] Patient: Steven Gil MRN: [id number] :1950 Visit Information Date & Time Provider Department Dept. Phone Encounter #  
 2018  9:00 AM Nate Brenner [phone] [id number] Follow-up Instructions Return in about 3 months (around 2018). Upcoming Health Maintenance Date Due  
 GLAUCOMA SCREENING Q2Y 2017 Influenza Age 5 to Adult 2018 Pneumococcal 65+ Low/Medium Risk (2 of 2 - PPSV23) 1/15/2019 MEDICARE YEARLY EXAM 2019 COLONOSCOPY 2024 DTaP/Tdap/Td series (2 - Td) 2026 Allergies as of 2018  Review Complete On: 2018 By: Sergei Arias MD  
  
 Severity Noted Reaction Type Reactions Diclofenac  2010    Other (comments) Ringing in ears Propoxyphene-acetaminophen  2010    Rash  
 Sulfa (Sulfonamide Antibiotics)  2010    Shortness of Breath Pt states he is not allergic. Zithromax [Azithromycin]  2014    Rash Current Immunizations  Reviewed on 1/15/2018 Name Date Influenza Vaccine 10/20/2014 Influenza Vaccine PF 1/3/2014 Pneumococcal Conjugate (PCV-13) 1/15/2018 Not reviewed this visit You Were Diagnosed With   
  
 Codes Comments Mixed hyperlipidemia    -  Primary ICD-10-CM: G97.8 ICD-9-CM: 272.2 Essential hypertension, benign     ICD-10-CM: I10 
ICD-9-CM: 401.1 Gastroesophageal reflux disease with esophagitis     ICD-10-CM: K21.0 ICD-9-CM: 530.11 Non-seasonal allergic rhinitis, unspecified trigger     ICD-10-CM: J30.89 ICD-9-CM: 477.8 Nocturia     ICD-10-CM: R35.1 ICD-9-CM: 788.43 Vitals BP Pulse Temp Resp Height(growth percentile) Weight(growth percentile)  142/88 (BP 1 Location: Left arm, BP Patient Position: Sitting) 67 98.1 °F (36.7 °C) (Oral) 18 6' (1.829 m) 254 lb 6.4 oz (115.4 kg) SpO2 BMI Smoking Status 96% 34.5 kg/m2 Never Smoker Vitals History BMI and BSA Data Body Mass Index Body Surface Area 34.5 kg/m 2 2.42 m 2 Preferred Pharmacy Pharmacy Name Phone [id number] [place] [place] - [address] [facility] AT [address] [place] [address] [phone] Your Updated Medication List  
  
   
This list is accurate as of 9/7/18  9:29 AM.  Always use your most recent med list.  
  
  
  
  
 albuterol 90 mcg/actuation inhaler Commonly known as:  PROAIR HFA Take 2 Puffs by inhalation every six (6) hours as needed for Wheezing. * ALPHAGAN P 0.15 % ophthalmic solution Generic drug:  brimonidine Administer 1 Drop to both eyes three (3) times daily. Indications: Pt not taking this dose. * brimonidine 0.2 % ophthalmic solution Commonly known as:  ALPHAGAN  
INSTILL 1 DROP IN BOTH EYES 3 TIMES DAILY ALREX 0.2 % Drps Generic drug:  loteprednol etabonate Administer 1 Drop to both eyes four (4) times daily. aspirin 81 mg tablet Take 81 mg by mouth. atorvastatin 80 mg tablet Commonly known as:  LIPITOR  
TAKE 1 TABLET BY MOUTH AT BEDTIME  
  
 AZOPT 1 % ophthalmic suspension Generic drug:  brinzolamide BETIMOL 0.5 % ophthalmic solution Generic drug:  timolol Administer 1 Drop to both eyes two (2) times a day. use in affected eye(s)  
  
 cyanocobalamin 500 mcg tablet Commonly known as:  VITAMIN B12 Take 1 Tab by mouth daily. dilTIAZem  mg ER capsule Commonly known as:  CARDIZEM CD  
TAKE 1 CAP BY MOUTH DAILY. EPINEPHrine 0.3 mg/0.3 mL injection Commonly known as:  Higinio Hamilton INJECT INTRAMUSCULARLY ONCE. MAY REPEAT IF NECESSARY  
  
 famotidine 40 mg tablet Commonly known as:  PEPCID  
TAKE 1 TABLET BY MOUTH EVERY DAY  
  
 fexofenadine 180 mg tablet Commonly known as:  Berhane Gordon Take  by mouth. hydroCHLOROthiazide 12.5 mg tablet Commonly known as:  HYDRODIURIL Take 1 Tab by mouth daily. ibuprofen 800 mg tablet Commonly known as:  MOTRIN  
TAKE 1 TABLET BY MOUTH EVERY 8 HOURS AS NEEDED FOR PAIN  
  
 LUMIGAN 0.01 % ophthalmic drops Generic drug:  bimatoprost  
  
 meclizine 12.5 mg tablet Commonly known as:  ANTIVERT Take 1 Tab by mouth three (3) times daily as needed. methazolAMIDE 50 mg tablet Commonly known as:  NEPTAZANE  
  
 montelukast 10 mg tablet Commonly known as:  SINGULAIR  
  
 multivitamin tablet Commonly known as:  ONE A DAY Take 1 Tab by mouth daily. OTHER  
  
 prednisoLONE acetate 1 % ophthalmic suspension Commonly known as:  PRED FORTE INSTILL 1 DROP INTO RIGHT EYE 4 TIMES A DAY  
  
 ramipril 10 mg capsule Commonly known as:  ALTACE  
TAKE 2 CAPSULES BY MOUTH EVERY DAY  
  
 SALINE NASAL 0.65 % nasal squeeze bottle Generic drug:  sodium chloride INHALE 2 SPRAYS IN EACH NOSTRIL 4 TIMES A DAY FOR 14 DAYS  
  
 sodium chloride irrigation 0.9 % irrigation IRRIGATE EACH NASAL CAVITY WITH 60CCS OF NORMAL SALINE SOLUTION DAILY. DISP: 1 LITER  
  
 triamcinolone acetonide 0.1 % topical cream  
Commonly known as:  KENALOG Apply  to affected area three (3) times daily as needed for Skin Irritation. * Notice: This list has 2 medication(s) that are the same as other medications prescribed for you. Read the directions carefully, and ask your doctor or other care provider to review them with you. We Performed the Following CBC WITH AUTOMATED DIFF [73847 CPT(R)] CK [id number] CPT(R)] LIPID PANEL [04962 CPT(R)] METABOLIC PANEL, COMPREHENSIVE [62945 CPT(R)] PSA, DIAGNOSTIC (PROSTATE SPECIFIC AG) [id number] CPT(R)] Follow-up Instructions Return in about 3 months (around 12/7/2018). Introducing [facility]!    
 [facility] [employer] introduces [facility] [employer] patient portal. Now you can access parts of your medical record, email your doctor's office, and request medication refills online. 1. In your internet browser, go to [email or web address] 2. Click on the First Time User? Click Here link in the Sign In box. You will see the New Member Sign Up page. 3. Enter your [email or web address] Access Code exactly as it appears below. You will not need to use this code after youve completed the sign-up process. If you do not sign up before the expiration date, you must request a new code. · [email or web address] Access Code: [email or web address] Expires: 10/14/2018  3:13 PM 
 
4. Enter the last four digits of your Social Security Number (xxxx) and Date of Birth (mm/dd/yyyy) as indicated and click Submit. You will be taken to the next sign-up page. 5. Create a [email or web address] ID. This will be your [email or web address] login ID and cannot be changed, so think of one that is secure and easy to remember. 6. Create a [email or web address] password. You can change your password at any time. 7. Enter your Password Reset Question and Answer. This can be used at a later time if you forget your password. 8. Enter your e-mail address. You will receive e-mail notification when new information is available in [address] 9. Click Sign Up. You can now view and download portions of your medical record. 10. Click the Download Summary menu link to download a portable copy of your medical information. If you have questions, please visit the Frequently Asked Questions section of the [email or web address] website. Remember, [email or web address] is NOT to be used for urgent needs. For medical emergencies, dial 911. Now available from your iPhone and Android! Please provide this summary of care documentation to your next provider. Your primary care clinician is listed as Jordan Sandoval. If you have any questions after today's visit, please call [phone].

## 2018-09-07 NOTE — PROGRESS NOTES
HISTORY OF PRESENT ILLNESS  Elaine Milton is a 74 y.o. male. f/u hbp,chol,,ar,glaucoma. Tolerating atorvastatin ok. Doing ok,getting allergy shots. Occasional lightheadednes  Hypertension    This is a chronic problem. The problem has not changed since onset. Pertinent negatives include no orthopnea, no blurred vision, no peripheral edema and no shortness of breath. Cholesterol Problem   This is a chronic problem. The problem occurs daily. The problem has been gradually improving. Pertinent negatives include no shortness of breath. Review of Systems   Eyes: Negative for blurred vision. Respiratory: Negative for shortness of breath. Cardiovascular: Negative for orthopnea. Psychiatric/Behavioral: Negative for depression. Physical Exam   Constitutional: He is oriented to person, place, and time. He appears well-developed and well-nourished. HENT:   Head: Normocephalic and atraumatic. Right Ear: External ear normal.   Left Ear: External ear normal.   Nose: Nose normal.   Mouth/Throat: Oropharynx is clear and moist.   Eyes: Conjunctivae are normal. Pupils are equal, round, and reactive to light. Neck: Normal range of motion. Neck supple. No tracheal deviation present. No thyromegaly present. Cardiovascular: Normal rate, regular rhythm, normal heart sounds and intact distal pulses. Pulmonary/Chest: Effort normal and breath sounds normal. No respiratory distress. Abdominal: Soft. Bowel sounds are normal. There is no tenderness. Lymphadenopathy:     He has no cervical adenopathy. Neurological: He is alert and oriented to person, place, and time. No cranial nerve deficit. Skin: Skin is warm and dry. Rash noted. Psychiatric: He has a normal mood and affect. Vitals reviewed. ASSESSMENT and PLAN  Diagnoses and all orders for this visit:    Diagnoses and all orders for this visit:    1. Mixed hyperlipidemia  -     LIPID PANEL  -     CK    2.  Essential hypertension, benign  - METABOLIC PANEL, COMPREHENSIVE  -     CBC WITH AUTOMATED DIFF    3. Gastroesophageal reflux disease with esophagitis    4. Non-seasonal allergic rhinitis, unspecified trigger    5. Nocturia  -     PROSTATE SPECIFIC AG      Follow-up Disposition:  Return in about 3 months (around 12/7/2018).         Follow-up Disposition: Not on File

## 2018-09-08 LAB
ALBUMIN SERPL-MCNC: 4.4 G/DL (ref 3.6–4.8)
ALBUMIN/GLOB SERPL: 1.6 {RATIO} (ref 1.2–2.2)
ALP SERPL-CCNC: 116 IU/L (ref 39–117)
ALT SERPL-CCNC: 25 IU/L (ref 0–44)
AST SERPL-CCNC: 19 IU/L (ref 0–40)
BASOPHILS # BLD AUTO: 0.1 X10E3/UL (ref 0–0.2)
BASOPHILS NFR BLD AUTO: 1 %
BILIRUB SERPL-MCNC: 0.9 MG/DL (ref 0–1.2)
BUN SERPL-MCNC: 13 MG/DL (ref 8–27)
BUN/CREAT SERPL: 14 (ref 10–24)
CALCIUM SERPL-MCNC: 9.6 MG/DL (ref 8.6–10.2)
CHLORIDE SERPL-SCNC: 98 MMOL/L (ref 96–106)
CHOLEST SERPL-MCNC: 110 MG/DL (ref 100–199)
CK SERPL-CCNC: 134 U/L (ref 24–204)
CO2 SERPL-SCNC: 26 MMOL/L (ref 20–29)
CREAT SERPL-MCNC: 0.96 MG/DL (ref 0.76–1.27)
EOSINOPHIL # BLD AUTO: 0.5 X10E3/UL (ref 0–0.4)
EOSINOPHIL NFR BLD AUTO: 6 %
ERYTHROCYTE [DISTWIDTH] IN BLOOD BY AUTOMATED COUNT: 14 % (ref 12.3–15.4)
GLOBULIN SER CALC-MCNC: 2.7 G/DL (ref 1.5–4.5)
GLUCOSE SERPL-MCNC: 112 MG/DL (ref 65–99)
HCT VFR BLD AUTO: 39.4 % (ref 37.5–51)
HDLC SERPL-MCNC: 41 MG/DL
HGB BLD-MCNC: 13.1 G/DL (ref 13–17.7)
IMM GRANULOCYTES # BLD: 0 X10E3/UL (ref 0–0.1)
IMM GRANULOCYTES NFR BLD: 0 %
INTERPRETATION, 910389: NORMAL
LDLC SERPL CALC-MCNC: 55 MG/DL (ref 0–99)
LYMPHOCYTES # BLD AUTO: 2.2 X10E3/UL (ref 0.7–3.1)
LYMPHOCYTES NFR BLD AUTO: 25 %
MCH RBC QN AUTO: 28.7 PG (ref 26.6–33)
MCHC RBC AUTO-ENTMCNC: 33.2 G/DL (ref 31.5–35.7)
MCV RBC AUTO: 86 FL (ref 79–97)
MONOCYTES # BLD AUTO: 0.6 X10E3/UL (ref 0.1–0.9)
MONOCYTES NFR BLD AUTO: 7 %
NEUTROPHILS # BLD AUTO: 5.5 X10E3/UL (ref 1.4–7)
NEUTROPHILS NFR BLD AUTO: 61 %
PLATELET # BLD AUTO: 304 X10E3/UL (ref 150–379)
POTASSIUM SERPL-SCNC: 3.8 MMOL/L (ref 3.5–5.2)
PROT SERPL-MCNC: 7.1 G/DL (ref 6–8.5)
PSA SERPL-MCNC: 1.4 NG/ML (ref 0–4)
RBC # BLD AUTO: 4.57 X10E6/UL (ref 4.14–5.8)
SODIUM SERPL-SCNC: 139 MMOL/L (ref 134–144)
TRIGL SERPL-MCNC: 70 MG/DL (ref 0–149)
VLDLC SERPL CALC-MCNC: 14 MG/DL (ref 5–40)
WBC # BLD AUTO: 8.9 X10E3/UL (ref 3.4–10.8)

## 2018-10-14 DIAGNOSIS — K21.00 GASTROESOPHAGEAL REFLUX DISEASE WITH ESOPHAGITIS: ICD-10-CM

## 2018-10-14 RX ORDER — FAMOTIDINE 40 MG/1
TABLET, FILM COATED ORAL
Qty: 30 TAB | Refills: 1 | Status: SHIPPED | OUTPATIENT
Start: 2018-10-14 | End: 2018-12-14 | Stop reason: SDUPTHER

## 2018-10-22 NOTE — TELEPHONE ENCOUNTER
Patient stated Luisa Karimi told him to call if he were having flu symptoms. Patient stated he is and they started yesterday. Patient is having cold chills, runny nose and body aches. Patient can be reached at [phone].

## 2018-10-23 RX ORDER — AMOXICILLIN 500 MG/1
500 CAPSULE ORAL 3 TIMES DAILY
Qty: 21 CAP | Refills: 0 | Status: SHIPPED | OUTPATIENT
Start: 2018-10-23 | End: 2018-10-30

## 2018-10-23 NOTE — TELEPHONE ENCOUNTER
Patient states that he feel better this morning, he has runny nose and body ache. He has used Amoxicillin before.

## 2018-11-21 RX ORDER — ATORVASTATIN CALCIUM 80 MG/1
TABLET, FILM COATED ORAL
Qty: 30 TAB | Refills: 5 | Status: SHIPPED | OUTPATIENT
Start: 2018-11-21 | End: 2019-01-04 | Stop reason: SINTOL

## 2018-12-07 ENCOUNTER — OFFICE VISIT (OUTPATIENT)
Dept: FAMILY MEDICINE CLINIC | Age: 68
End: 2018-12-07

## 2018-12-07 VITALS
WEIGHT: 260.6 LBS | TEMPERATURE: 98.4 F | BODY MASS INDEX: 35.3 KG/M2 | DIASTOLIC BLOOD PRESSURE: 82 MMHG | OXYGEN SATURATION: 97 % | HEART RATE: 82 BPM | HEIGHT: 72 IN | RESPIRATION RATE: 20 BRPM | SYSTOLIC BLOOD PRESSURE: 122 MMHG

## 2018-12-07 DIAGNOSIS — M15.9 PRIMARY OSTEOARTHRITIS INVOLVING MULTIPLE JOINTS: ICD-10-CM

## 2018-12-07 DIAGNOSIS — I10 ESSENTIAL HYPERTENSION, BENIGN: Primary | ICD-10-CM

## 2018-12-07 DIAGNOSIS — K21.00 GASTROESOPHAGEAL REFLUX DISEASE WITH ESOPHAGITIS: ICD-10-CM

## 2018-12-07 DIAGNOSIS — E78.2 MIXED HYPERLIPIDEMIA: ICD-10-CM

## 2018-12-07 DIAGNOSIS — M54.42 ACUTE BILATERAL LOW BACK PAIN WITH LEFT-SIDED SCIATICA: ICD-10-CM

## 2018-12-07 PROBLEM — E66.01 SEVERE OBESITY (HCC): Status: ACTIVE | Noted: 2018-12-07

## 2018-12-07 NOTE — PROGRESS NOTES
HISTORY OF PRESENT ILLNESS  Brayden Alvarado is a 79 y.o. male. f/u hbp,chol,ar,glaucoma. Doing ok,some low back pain intermittently  Hypertension    This is a chronic problem. The problem has been gradually improving. Pertinent negatives include no chest pain, no orthopnea, no malaise/fatigue, no headaches, no peripheral edema, no dizziness and no shortness of breath. Cholesterol Problem   This is a chronic problem. The problem occurs daily. The problem has not changed since onset. Pertinent negatives include no chest pain, no headaches and no shortness of breath. Back Pain    This is a new problem. The problem has been gradually improving. The problem occurs daily. The pain is associated with no known injury. The pain is present in the lumbar spine. The pain radiates to the left thigh. The pain is at a severity of 3/10. The pain is moderate. The symptoms are aggravated by certain positions, twisting and bending. Pertinent negatives include no chest pain and no headaches. Review of Systems   Constitutional: Negative for malaise/fatigue. HENT: Positive for congestion. Respiratory: Negative for shortness of breath. Cardiovascular: Negative for chest pain and orthopnea. Genitourinary: Negative for frequency. Musculoskeletal: Positive for back pain. Neurological: Negative for dizziness and headaches. Physical Exam   Constitutional: He is oriented to person, place, and time. He appears well-developed and well-nourished. HENT:   Head: Normocephalic and atraumatic. Eyes: Conjunctivae are normal. Pupils are equal, round, and reactive to light. Neck: Normal range of motion. Neck supple. Cardiovascular: Normal rate and regular rhythm. Exam reveals no gallop. No murmur heard. Pulmonary/Chest: Effort normal and breath sounds normal.   Abdominal: Soft. Bowel sounds are normal.   Musculoskeletal:        Lumbar back: He exhibits decreased range of motion and tenderness.  He exhibits no deformity. Back:    SLRs 90/90,DTRs normal and symmetrical     Neurological: He is alert and oriented to person, place, and time. Skin: Skin is warm and dry. Psychiatric: He has a normal mood and affect. ASSESSMENT and PLAN  Diagnoses and all orders for this visit:    1. Essential hypertension, benign    2. Mixed hyperlipidemia    3. Gastroesophageal reflux disease with esophagitis    4. Primary osteoarthritis involving multiple joints  -     ibuprofen (MOTRIN) 800 mg tablet; TAKE 1 TABLET BY MOUTH EVERY 8 HOURS AS NEEDED FOR PAIN    5. Acute bilateral low back pain with left-sided sciatica      Follow-up Disposition:  Return in about 4 weeks (around 1/4/2019).

## 2018-12-09 RX ORDER — IBUPROFEN 800 MG/1
TABLET ORAL
Qty: 50 TAB | Refills: 6 | Status: SHIPPED | OUTPATIENT
Start: 2018-12-09 | End: 2019-04-09 | Stop reason: SDUPTHER

## 2018-12-14 DIAGNOSIS — K21.00 GASTROESOPHAGEAL REFLUX DISEASE WITH ESOPHAGITIS: ICD-10-CM

## 2018-12-14 RX ORDER — FAMOTIDINE 40 MG/1
TABLET, FILM COATED ORAL
Qty: 30 TAB | Refills: 1 | Status: SHIPPED | OUTPATIENT
Start: 2018-12-14 | End: 2019-02-14 | Stop reason: SDUPTHER

## 2018-12-21 DIAGNOSIS — I10 ESSENTIAL HYPERTENSION, BENIGN: ICD-10-CM

## 2018-12-21 RX ORDER — HYDROCHLOROTHIAZIDE 12.5 MG/1
TABLET ORAL
Qty: 30 TAB | Refills: 11 | Status: SHIPPED | OUTPATIENT
Start: 2018-12-21 | End: 2019-10-09 | Stop reason: SDUPTHER

## 2019-01-04 ENCOUNTER — OFFICE VISIT (OUTPATIENT)
Dept: FAMILY MEDICINE CLINIC | Age: 69
End: 2019-01-04

## 2019-01-04 ENCOUNTER — HOSPITAL ENCOUNTER (OUTPATIENT)
Dept: LAB | Age: 69
Discharge: HOME | End: 2019-01-04
Payer: MEDICARE

## 2019-01-04 VITALS
HEART RATE: 74 BPM | SYSTOLIC BLOOD PRESSURE: 138 MMHG | BODY MASS INDEX: 35.08 KG/M2 | DIASTOLIC BLOOD PRESSURE: 84 MMHG | TEMPERATURE: 98.2 F | WEIGHT: 259 LBS | RESPIRATION RATE: 20 BRPM | HEIGHT: 72 IN | OXYGEN SATURATION: 97 %

## 2019-01-04 DIAGNOSIS — I10 ESSENTIAL HYPERTENSION, BENIGN: Primary | ICD-10-CM

## 2019-01-04 DIAGNOSIS — E78.2 MIXED HYPERLIPIDEMIA: ICD-10-CM

## 2019-01-04 PROCEDURE — 36415 COLL VENOUS BLD VENIPUNCTURE: CPT

## 2019-01-04 PROCEDURE — 80061 LIPID PANEL: CPT

## 2019-01-04 NOTE — PROGRESS NOTES
HISTORY OF PRESENT ILLNESS  Bailey Max is a 79 y.o. malef/u hbp,chol. Not taking atorvastatin  To see how lipids are without it. Feeling well. Hypertension    This is a chronic problem. The problem has not changed since onset. Pertinent negatives include no chest pain, no orthopnea, no tinnitus, no neck pain, no peripheral edema, no dizziness and no shortness of breath. Cholesterol Problem   This is a chronic problem. The problem occurs daily. The problem has not changed since onset. Pertinent negatives include no chest pain and no shortness of breath. Review of Systems   Constitutional: Negative for chills and fever. HENT: Negative for tinnitus. Respiratory: Negative for shortness of breath. Cardiovascular: Negative for chest pain and orthopnea. Genitourinary: Negative for frequency. Musculoskeletal: Negative for back pain, myalgias and neck pain. Neurological: Negative for dizziness. Physical Exam   Constitutional: He is oriented to person, place, and time. He appears well-developed and well-nourished. HENT:   Head: Normocephalic and atraumatic. Right Ear: External ear normal.   Left Ear: External ear normal.   Nose: Nose normal.   Mouth/Throat: Oropharynx is clear and moist.   Cardiovascular: Normal rate and regular rhythm. Pulmonary/Chest: Effort normal and breath sounds normal.   Abdominal: Soft. Bowel sounds are normal.   Neurological: He is alert and oriented to person, place, and time. Skin: Skin is warm and dry. Psychiatric: He has a normal mood and affect. ASSESSMENT and PLAN  Diagnoses and all orders for this visit:    1. Essential hypertension, benign    2. Mixed hyperlipidemia. awaiting results while off atorvastatin  -     LIPID PANEL      Follow-up Disposition:  Return in about 3 months (around 4/4/2019).

## 2019-01-05 LAB
CHOLEST SERPL-MCNC: 219 MG/DL (ref 100–199)
HDLC SERPL-MCNC: 45 MG/DL
INTERPRETATION, 910389: NORMAL
LDLC SERPL CALC-MCNC: 143 MG/DL (ref 0–99)
TRIGL SERPL-MCNC: 156 MG/DL (ref 0–149)
VLDLC SERPL CALC-MCNC: 31 MG/DL (ref 5–40)

## 2019-02-01 DIAGNOSIS — E78.2 MIXED HYPERLIPIDEMIA: Primary | ICD-10-CM

## 2019-02-01 RX ORDER — ATORVASTATIN CALCIUM 10 MG/1
10 TABLET, FILM COATED ORAL DAILY
Qty: 30 TAB | Refills: 11 | Status: SHIPPED | OUTPATIENT
Start: 2019-02-01 | End: 2019-12-17 | Stop reason: SDUPTHER

## 2019-02-14 DIAGNOSIS — K21.00 GASTROESOPHAGEAL REFLUX DISEASE WITH ESOPHAGITIS: ICD-10-CM

## 2019-02-14 RX ORDER — FAMOTIDINE 40 MG/1
TABLET, FILM COATED ORAL
Qty: 30 TAB | Refills: 1 | Status: SHIPPED | OUTPATIENT
Start: 2019-02-14 | End: 2019-04-16 | Stop reason: SDUPTHER

## 2019-02-18 RX ORDER — RAMIPRIL 10 MG/1
CAPSULE ORAL
Qty: 60 CAP | Refills: 10 | Status: SHIPPED | OUTPATIENT
Start: 2019-02-18 | End: 2020-01-06

## 2019-04-09 ENCOUNTER — HOSPITAL ENCOUNTER (OUTPATIENT)
Dept: LAB | Age: 69
Discharge: HOME | End: 2019-04-09
Payer: MEDICARE

## 2019-04-09 ENCOUNTER — OFFICE VISIT (OUTPATIENT)
Dept: FAMILY MEDICINE CLINIC | Age: 69
End: 2019-04-09

## 2019-04-09 VITALS
HEIGHT: 72 IN | RESPIRATION RATE: 20 BRPM | WEIGHT: 262.2 LBS | BODY MASS INDEX: 35.51 KG/M2 | HEART RATE: 63 BPM | OXYGEN SATURATION: 97 % | SYSTOLIC BLOOD PRESSURE: 140 MMHG | TEMPERATURE: 98 F | DIASTOLIC BLOOD PRESSURE: 86 MMHG

## 2019-04-09 DIAGNOSIS — S33.8XXA SPRAIN OF GROIN, INITIAL ENCOUNTER: Primary | ICD-10-CM

## 2019-04-09 DIAGNOSIS — I10 ESSENTIAL HYPERTENSION, BENIGN: ICD-10-CM

## 2019-04-09 DIAGNOSIS — H40.9 GLAUCOMA OF BOTH EYES, UNSPECIFIED GLAUCOMA TYPE: ICD-10-CM

## 2019-04-09 DIAGNOSIS — E78.2 MIXED HYPERLIPIDEMIA: ICD-10-CM

## 2019-04-09 DIAGNOSIS — M15.9 PRIMARY OSTEOARTHRITIS INVOLVING MULTIPLE JOINTS: ICD-10-CM

## 2019-04-09 DIAGNOSIS — K21.00 GASTROESOPHAGEAL REFLUX DISEASE WITH ESOPHAGITIS: ICD-10-CM

## 2019-04-09 DIAGNOSIS — J45.20 MILD INTERMITTENT ASTHMA WITHOUT COMPLICATION: ICD-10-CM

## 2019-04-09 PROCEDURE — 85025 COMPLETE CBC W/AUTO DIFF WBC: CPT

## 2019-04-09 PROCEDURE — 80061 LIPID PANEL: CPT

## 2019-04-09 PROCEDURE — 80053 COMPREHEN METABOLIC PANEL: CPT

## 2019-04-09 PROCEDURE — 36415 COLL VENOUS BLD VENIPUNCTURE: CPT

## 2019-04-09 RX ORDER — MUPIROCIN 20 MG/G
OINTMENT TOPICAL DAILY
Qty: 22 G | Refills: 0 | Status: SHIPPED | OUTPATIENT
Start: 2019-04-09 | End: 2019-10-07

## 2019-04-09 RX ORDER — IBUPROFEN 800 MG/1
TABLET ORAL
Qty: 50 TAB | Refills: 6 | Status: SHIPPED | OUTPATIENT
Start: 2019-04-09 | End: 2020-04-27

## 2019-04-09 RX ORDER — ALBUTEROL SULFATE 90 UG/1
2 AEROSOL, METERED RESPIRATORY (INHALATION)
Qty: 1 INHALER | Refills: 11 | Status: SHIPPED | OUTPATIENT
Start: 2019-04-09 | End: 2020-05-07 | Stop reason: SDUPTHER

## 2019-04-09 NOTE — PROGRESS NOTES
HISTORY OF PRESENT ILLNESS  Elaine Renner is a 79 y.o. male. sprained l groin lifting mulch last week,slowly improving pain. F/U HBP,CHOL. Hypertension    The history is provided by the patient. This is a chronic problem. Pertinent negatives include no chest pain, no malaise/fatigue, no peripheral edema, no dizziness and no shortness of breath. Cholesterol Problem   The history is provided by the patient. This is a chronic problem. The problem occurs daily. The problem has not changed since onset. Associated symptoms include abdominal pain. Pertinent negatives include no chest pain and no shortness of breath. Groin Injury   The history is provided by the patient. This is a chronic problem. The problem occurs daily. The problem has not changed since onset. Pertinent negatives include no dysuria. Associated symptoms include abdominal pain. Pertinent negatives include no frequency. Review of Systems   Constitutional: Negative for malaise/fatigue. Respiratory: Negative for shortness of breath. Cardiovascular: Negative for chest pain. Gastrointestinal: Positive for abdominal pain. Negative for blood in stool. Genitourinary: Negative for dysuria, frequency and urgency. Neurological: Negative for dizziness. Physical Exam   Constitutional: He is oriented to person, place, and time. He appears well-developed and well-nourished. HENT:   Head: Normocephalic and atraumatic. Right Ear: External ear normal.   Left Ear: External ear normal.   Nose: Nose normal.   Mouth/Throat: Oropharynx is clear and moist.   Eyes: Pupils are equal, round, and reactive to light. Conjunctivae are normal.   Neck: Normal range of motion. Neck supple. Cardiovascular: Normal rate and regular rhythm. Pulmonary/Chest: Effort normal and breath sounds normal.   Abdominal: Soft.  Bowel sounds are normal.   Genitourinary:   Genitourinary Comments: Mild tenderness LLQ ,no mass  Or hernia appreciated   Neurological: He is alert and oriented to person, place, and time. Skin: Skin is warm and dry. ASSESSMENT and PLAN  Diagnoses and all orders for this visit:    1. Sprain of groin, initial encounter  -     ibuprofen (MOTRIN) 800 mg tablet; TAKE 1 TABLET BY MOUTH EVERY 8 HOURS AS NEEDED FOR PAIN  -     mupirocin (BACTROBAN) 2 % ointment; Apply  to affected area daily. 2. Essential hypertension, benign  -     METABOLIC PANEL, COMPREHENSIVE  -     CBC WITH AUTOMATED DIFF    3. Mixed hyperlipidemia  -     LIPID PANEL    4. Gastroesophageal reflux disease with esophagitis    5. Glaucoma of both eyes, unspecified glaucoma type    6. Primary osteoarthritis involving multiple joints  -     ibuprofen (MOTRIN) 800 mg tablet; TAKE 1 TABLET BY MOUTH EVERY 8 HOURS AS NEEDED FOR PAIN    7. Mild intermittent asthma without complication  -     albuterol (PROAIR HFA) 90 mcg/actuation inhaler; Take 2 Puffs by inhalation every six (6) hours as needed for Wheezing. Follow-up and Dispositions    · Return in about 3 months (around 7/9/2019).

## 2019-04-09 NOTE — PROGRESS NOTES
Chief Complaint   Patient presents with    Hypertension     F/U on BP.  Cholesterol Problem     F/U on cholesterol. 1. Have you been to the ER, urgent care clinic since your last visit? Hospitalized since your last visit? No    2. Have you seen or consulted any other health care providers outside of the [address] [place] [address] since your last visit? Include any pap smears or colon screening.  No

## 2019-04-10 LAB
ALBUMIN SERPL-MCNC: 4.5 G/DL (ref 3.6–4.8)
ALBUMIN/GLOB SERPL: 1.7 {RATIO} (ref 1.2–2.2)
ALP SERPL-CCNC: 94 IU/L (ref 39–117)
ALT SERPL-CCNC: 32 IU/L (ref 0–44)
AST SERPL-CCNC: 25 IU/L (ref 0–40)
BASOPHILS # BLD AUTO: 0.1 X10E3/UL (ref 0–0.2)
BASOPHILS NFR BLD AUTO: 1 %
BILIRUB SERPL-MCNC: 1 MG/DL (ref 0–1.2)
BUN SERPL-MCNC: 11 MG/DL (ref 8–27)
BUN/CREAT SERPL: 12 (ref 10–24)
CALCIUM SERPL-MCNC: 9.7 MG/DL (ref 8.6–10.2)
CHLORIDE SERPL-SCNC: 99 MMOL/L (ref 96–106)
CHOLEST SERPL-MCNC: 171 MG/DL (ref 100–199)
CO2 SERPL-SCNC: 27 MMOL/L (ref 20–29)
CREAT SERPL-MCNC: 0.89 MG/DL (ref 0.76–1.27)
EOSINOPHIL # BLD AUTO: 0.3 X10E3/UL (ref 0–0.4)
EOSINOPHIL NFR BLD AUTO: 4 %
ERYTHROCYTE [DISTWIDTH] IN BLOOD BY AUTOMATED COUNT: 13.6 % (ref 12.3–15.4)
GLOBULIN SER CALC-MCNC: 2.6 G/DL (ref 1.5–4.5)
GLUCOSE SERPL-MCNC: 99 MG/DL (ref 65–99)
HCT VFR BLD AUTO: 41.8 % (ref 37.5–51)
HDLC SERPL-MCNC: 50 MG/DL
HGB BLD-MCNC: 13.8 G/DL (ref 13–17.7)
IMM GRANULOCYTES # BLD AUTO: 0 X10E3/UL (ref 0–0.1)
IMM GRANULOCYTES NFR BLD AUTO: 0 %
INTERPRETATION, 910389: NORMAL
LDLC SERPL CALC-MCNC: 97 MG/DL (ref 0–99)
LYMPHOCYTES # BLD AUTO: 2.7 X10E3/UL (ref 0.7–3.1)
LYMPHOCYTES NFR BLD AUTO: 34 %
MCH RBC QN AUTO: 29.2 PG (ref 26.6–33)
MCHC RBC AUTO-ENTMCNC: 33 G/DL (ref 31.5–35.7)
MCV RBC AUTO: 89 FL (ref 79–97)
MONOCYTES # BLD AUTO: 0.6 X10E3/UL (ref 0.1–0.9)
MONOCYTES NFR BLD AUTO: 8 %
NEUTROPHILS # BLD AUTO: 4.3 X10E3/UL (ref 1.4–7)
NEUTROPHILS NFR BLD AUTO: 53 %
PLATELET # BLD AUTO: 302 X10E3/UL (ref 150–379)
POTASSIUM SERPL-SCNC: 4.1 MMOL/L (ref 3.5–5.2)
PROT SERPL-MCNC: 7.1 G/DL (ref 6–8.5)
RBC # BLD AUTO: 4.72 X10E6/UL (ref 4.14–5.8)
SODIUM SERPL-SCNC: 140 MMOL/L (ref 134–144)
TRIGL SERPL-MCNC: 120 MG/DL (ref 0–149)
VLDLC SERPL CALC-MCNC: 24 MG/DL (ref 5–40)
WBC # BLD AUTO: 8.1 X10E3/UL (ref 3.4–10.8)

## 2019-04-11 DIAGNOSIS — S33.8XXA SPRAIN OF GROIN, INITIAL ENCOUNTER: ICD-10-CM

## 2019-04-11 NOTE — TELEPHONE ENCOUNTER
Pharmacy faxed over script clarification asking that a new prescription be sent over for Bactroban 2%ointment with day supply on it.

## 2019-04-12 DIAGNOSIS — T14.8XXA ABRASION: Primary | ICD-10-CM

## 2019-04-12 RX ORDER — MUPIROCIN 20 MG/G
OINTMENT TOPICAL
Qty: 22 G | Refills: 0 | OUTPATIENT
Start: 2019-04-12

## 2019-04-12 RX ORDER — MUPIROCIN 20 MG/G
OINTMENT TOPICAL
Qty: 22 G | Refills: 0 | Status: SHIPPED | OUTPATIENT
Start: 2019-04-12 | End: 2019-10-07

## 2019-04-16 DIAGNOSIS — K21.00 GASTROESOPHAGEAL REFLUX DISEASE WITH ESOPHAGITIS: ICD-10-CM

## 2019-04-16 RX ORDER — FAMOTIDINE 40 MG/1
TABLET, FILM COATED ORAL
Qty: 30 TAB | Refills: 1 | Status: SHIPPED | OUTPATIENT
Start: 2019-04-16 | End: 2019-06-15 | Stop reason: SDUPTHER

## 2019-04-18 DIAGNOSIS — I10 ESSENTIAL HYPERTENSION, BENIGN: ICD-10-CM

## 2019-04-18 RX ORDER — DILTIAZEM HYDROCHLORIDE 240 MG/1
CAPSULE, COATED, EXTENDED RELEASE ORAL
Qty: 30 CAP | Refills: 11 | Status: SHIPPED | OUTPATIENT
Start: 2019-04-18 | End: 2020-03-09

## 2019-06-07 ENCOUNTER — OFFICE VISIT (OUTPATIENT)
Dept: FAMILY MEDICINE CLINIC | Age: 69
End: 2019-06-07

## 2019-06-07 VITALS
TEMPERATURE: 98.6 F | HEART RATE: 74 BPM | OXYGEN SATURATION: 95 % | BODY MASS INDEX: 36.16 KG/M2 | WEIGHT: 267 LBS | SYSTOLIC BLOOD PRESSURE: 122 MMHG | DIASTOLIC BLOOD PRESSURE: 62 MMHG | RESPIRATION RATE: 20 BRPM | HEIGHT: 72 IN

## 2019-06-07 DIAGNOSIS — M25.562 ACUTE PAIN OF LEFT KNEE: Primary | ICD-10-CM

## 2019-06-07 DIAGNOSIS — I10 ESSENTIAL HYPERTENSION, BENIGN: ICD-10-CM

## 2019-06-07 DIAGNOSIS — K21.00 GASTROESOPHAGEAL REFLUX DISEASE WITH ESOPHAGITIS: ICD-10-CM

## 2019-06-07 RX ORDER — SULINDAC 200 MG/1
200 TABLET ORAL 2 TIMES DAILY WITH MEALS
Qty: 30 TAB | Refills: 1 | Status: SHIPPED | OUTPATIENT
Start: 2019-06-07 | End: 2019-07-09

## 2019-06-07 NOTE — PROGRESS NOTES
Chief Complaint   Patient presents with    Knee Pain     Pt having R knee pain. 1. Have you been to the ER, urgent care clinic since your last visit? Hospitalized since your last visit? No    2. Have you seen or consulted any other health care providers outside of the [address] [place] [address] since your last visit? Include any pap smears or colon screening.  No

## 2019-06-07 NOTE — PROGRESS NOTES
HISTORY OF PRESENT ILLNESS  Edwige Medrano is a 79 y.o. male. 3 weeks l knee and distal thigh pain. No apparent injury,sx improved with motrin  Knee Pain    The history is provided by the patient. This is a new problem. The current episode started more than 1 week ago. The problem occurs hourly. The problem has not changed since onset. The pain is present in the left knee. The quality of the pain is described as aching. The pain is at a severity of 4/10. The pain is moderate. Associated symptoms include limited range of motion and stiffness. Pertinent negatives include no back pain. He has tried OTC pain medications for the symptoms. Review of Systems   Constitutional: Negative for fever and weight loss. Musculoskeletal: Positive for joint pain, myalgias and stiffness. Negative for back pain. Physical Exam   Constitutional: He appears well-developed and well-nourished. HENT:   Head: Normocephalic and atraumatic. Right Ear: External ear normal.   Left Ear: External ear normal.   Nose: Nose normal.   Mouth/Throat: Oropharynx is clear and moist.   Cardiovascular: Normal rate and regular rhythm. Pulmonary/Chest: Effort normal and breath sounds normal.   Musculoskeletal:        Left knee: He exhibits decreased range of motion and swelling. He exhibits no effusion, no deformity and no bony tenderness. Legs:      ASSESSMENT and PLAN  Diagnoses and all orders for this visit:    1. Acute pain of left knee,icing ,rest  -     XR KNEE LT 3 V; Future  -     sulindac (CLINORIL) 200 mg tablet; Take 1 Tab by mouth two (2) times daily (with meals). 2. Essential hypertension, benign    3. Gastroesophageal reflux disease with esophagitis      Follow-up and Dispositions    · Return if symptoms worsen or fail to improve.

## 2019-06-10 DIAGNOSIS — M25.562 ACUTE PAIN OF LEFT KNEE: Primary | ICD-10-CM

## 2019-06-10 RX ORDER — PREDNISONE 10 MG/1
10 TABLET ORAL
Qty: 6 TAB | Refills: 0 | Status: SHIPPED | OUTPATIENT
Start: 2019-06-10 | End: 2019-07-09

## 2019-06-15 DIAGNOSIS — K21.00 GASTROESOPHAGEAL REFLUX DISEASE WITH ESOPHAGITIS: ICD-10-CM

## 2019-06-16 RX ORDER — FAMOTIDINE 40 MG/1
TABLET, FILM COATED ORAL
Qty: 30 TAB | Refills: 1 | Status: SHIPPED | OUTPATIENT
Start: 2019-06-16 | End: 2019-07-09 | Stop reason: SDUPTHER

## 2019-06-17 DIAGNOSIS — M25.562 ACUTE PAIN OF LEFT KNEE: Primary | ICD-10-CM

## 2019-07-09 ENCOUNTER — OFFICE VISIT (OUTPATIENT)
Dept: FAMILY MEDICINE CLINIC | Age: 69
End: 2019-07-09

## 2019-07-09 ENCOUNTER — HOSPITAL ENCOUNTER (OUTPATIENT)
Dept: LAB | Age: 69
Discharge: HOME | End: 2019-07-09
Payer: MEDICARE

## 2019-07-09 VITALS
OXYGEN SATURATION: 98 % | TEMPERATURE: 98 F | HEART RATE: 68 BPM | DIASTOLIC BLOOD PRESSURE: 88 MMHG | SYSTOLIC BLOOD PRESSURE: 128 MMHG | HEIGHT: 72 IN | WEIGHT: 265 LBS | RESPIRATION RATE: 18 BRPM | BODY MASS INDEX: 35.89 KG/M2

## 2019-07-09 DIAGNOSIS — J30.89 NON-SEASONAL ALLERGIC RHINITIS, UNSPECIFIED TRIGGER: ICD-10-CM

## 2019-07-09 DIAGNOSIS — M17.12 PRIMARY OSTEOARTHRITIS OF LEFT KNEE: ICD-10-CM

## 2019-07-09 DIAGNOSIS — E78.2 MIXED HYPERLIPIDEMIA: ICD-10-CM

## 2019-07-09 DIAGNOSIS — J45.20 MILD INTERMITTENT ASTHMA WITHOUT COMPLICATION: ICD-10-CM

## 2019-07-09 DIAGNOSIS — I10 ESSENTIAL HYPERTENSION, BENIGN: Primary | ICD-10-CM

## 2019-07-09 DIAGNOSIS — K21.00 GASTROESOPHAGEAL REFLUX DISEASE WITH ESOPHAGITIS: ICD-10-CM

## 2019-07-09 PROCEDURE — 82465 ASSAY BLD/SERUM CHOLESTEROL: CPT

## 2019-07-09 PROCEDURE — 80053 COMPREHEN METABOLIC PANEL: CPT

## 2019-07-09 PROCEDURE — 36415 COLL VENOUS BLD VENIPUNCTURE: CPT

## 2019-07-09 NOTE — PROGRESS NOTES
Chief Complaint   Patient presents with    Hypertension     F/U on BP.  Cholesterol Problem     F/U on cholesterol. 1. Have you been to the ER, urgent care clinic since your last visit? Hospitalized since your last visit? No    2. Have you seen or consulted any other health care providers outside of the [address] [place] [address] since your last visit? Include any pap smears or colon screening.  No

## 2019-07-09 NOTE — PROGRESS NOTES
HISTORY OF PRESENT ILLNESS  Blue Landeros is a 79 y.o. malef/u hbp,chol. Feeling well,seen by ortho for l knee oa,given injection  Hypertension    This is a chronic problem. The problem has not changed since onset. Pertinent negatives include no chest pain, no orthopnea, no tinnitus, no neck pain, no peripheral edema, no dizziness and no shortness of breath. Cholesterol Problem   This is a chronic problem. The problem occurs daily. The problem has not changed since onset. Pertinent negatives include no chest pain and no shortness of breath. Review of Systems   Constitutional: Negative for chills and fever. HENT: Negative for tinnitus. Respiratory: Negative for shortness of breath. Cardiovascular: Negative for chest pain and orthopnea. Genitourinary: Negative for frequency. Musculoskeletal: Negative for back pain, myalgias and neck pain. Neurological: Negative for dizziness. Physical Exam   Constitutional: He is oriented to person, place, and time. He appears well-developed and well-nourished. HENT:   Head: Normocephalic and atraumatic. Right Ear: External ear normal.   Left Ear: External ear normal.   Nose: Nose normal.   Mouth/Throat: Oropharynx is clear and moist.   Cardiovascular: Normal rate and regular rhythm. Pulmonary/Chest: Effort normal and breath sounds normal.   Abdominal: Soft. Bowel sounds are normal.   Neurological: He is alert and oriented to person, place, and time. Skin: Skin is warm and dry. Psychiatric: He has a normal mood and affect. Diagnoses and all orders for this visit:    1. Essential hypertension, benign,controlled  -     METABOLIC PANEL, COMPREHENSIVE    2. Mixed hyperlipidemia  -     CHOLESTEROL, TOTAL    3. Mild intermittent asthma without complication    4. Non-seasonal allergic rhinitis, unspecified trigger    5. Primary osteoarthritis of left knee,stable      Follow-up and Dispositions    · Return in about 3 months (around 10/9/2019).

## 2019-07-10 LAB
ALBUMIN SERPL-MCNC: 4.5 G/DL (ref 3.6–4.8)
ALBUMIN/GLOB SERPL: 1.7 {RATIO} (ref 1.2–2.2)
ALP SERPL-CCNC: 93 IU/L (ref 39–117)
ALT SERPL-CCNC: 34 IU/L (ref 0–44)
AST SERPL-CCNC: 21 IU/L (ref 0–40)
BILIRUB SERPL-MCNC: 0.7 MG/DL (ref 0–1.2)
BUN SERPL-MCNC: 14 MG/DL (ref 8–27)
BUN/CREAT SERPL: 14 (ref 10–24)
CALCIUM SERPL-MCNC: 9.6 MG/DL (ref 8.6–10.2)
CHLORIDE SERPL-SCNC: 98 MMOL/L (ref 96–106)
CHOLEST SERPL-MCNC: 150 MG/DL (ref 100–199)
CO2 SERPL-SCNC: 26 MMOL/L (ref 20–29)
CREAT SERPL-MCNC: 1.03 MG/DL (ref 0.76–1.27)
GLOBULIN SER CALC-MCNC: 2.7 G/DL (ref 1.5–4.5)
GLUCOSE SERPL-MCNC: 101 MG/DL (ref 65–99)
POTASSIUM SERPL-SCNC: 4.1 MMOL/L (ref 3.5–5.2)
PROT SERPL-MCNC: 7.2 G/DL (ref 6–8.5)
SODIUM SERPL-SCNC: 140 MMOL/L (ref 134–144)

## 2019-07-10 RX ORDER — FAMOTIDINE 40 MG/1
TABLET, FILM COATED ORAL
Qty: 30 TAB | Refills: 1 | Status: SHIPPED | OUTPATIENT
Start: 2019-07-10 | End: 2019-08-06 | Stop reason: SDUPTHER

## 2019-08-06 DIAGNOSIS — K21.00 GASTROESOPHAGEAL REFLUX DISEASE WITH ESOPHAGITIS: ICD-10-CM

## 2019-08-06 RX ORDER — FAMOTIDINE 40 MG/1
TABLET, FILM COATED ORAL
Qty: 30 TAB | Refills: 1 | Status: SHIPPED | OUTPATIENT
Start: 2019-08-06 | End: 2019-08-29 | Stop reason: SDUPTHER

## 2019-10-04 ENCOUNTER — OFFICE VISIT (OUTPATIENT)
Dept: FAMILY MEDICINE CLINIC | Age: 69
End: 2019-10-04

## 2019-10-04 ENCOUNTER — HOSPITAL ENCOUNTER (OUTPATIENT)
Dept: LAB | Age: 69
Discharge: HOME | End: 2019-10-04
Payer: MEDICARE

## 2019-10-04 VITALS
RESPIRATION RATE: 18 BRPM | DIASTOLIC BLOOD PRESSURE: 82 MMHG | HEART RATE: 85 BPM | BODY MASS INDEX: 35.76 KG/M2 | OXYGEN SATURATION: 97 % | SYSTOLIC BLOOD PRESSURE: 134 MMHG | HEIGHT: 72 IN | TEMPERATURE: 98.6 F | WEIGHT: 264 LBS

## 2019-10-04 DIAGNOSIS — R10.9 FLANK PAIN, ACUTE: ICD-10-CM

## 2019-10-04 DIAGNOSIS — R10.32 LLQ ABDOMINAL PAIN: Primary | ICD-10-CM

## 2019-10-04 LAB
BILIRUB UR QL STRIP: NEGATIVE
GLUCOSE UR-MCNC: NEGATIVE MG/DL
KETONES P FAST UR STRIP-MCNC: NEGATIVE MG/DL
PH UR STRIP: 6 [PH] (ref 4.6–8)
PROT UR QL STRIP: NEGATIVE
SP GR UR STRIP: 1.01 (ref 1–1.03)
UA UROBILINOGEN AMB POC: NORMAL (ref 0.2–1)
URINALYSIS CLARITY POC: CLEAR
URINALYSIS COLOR POC: YELLOW
URINE BLOOD POC: NORMAL
URINE LEUKOCYTES POC: NEGATIVE
URINE NITRITES POC: NEGATIVE

## 2019-10-04 PROCEDURE — 80053 COMPREHEN METABOLIC PANEL: CPT

## 2019-10-04 PROCEDURE — 36415 COLL VENOUS BLD VENIPUNCTURE: CPT

## 2019-10-04 RX ORDER — METRONIDAZOLE 250 MG/1
250 TABLET ORAL 3 TIMES DAILY
Qty: 21 TAB | Refills: 0 | Status: SHIPPED | OUTPATIENT
Start: 2019-10-04 | End: 2019-10-11 | Stop reason: ALTCHOICE

## 2019-10-04 RX ORDER — CIPROFLOXACIN 500 MG/1
500 TABLET ORAL 2 TIMES DAILY
Qty: 14 TAB | Refills: 0 | Status: SHIPPED | OUTPATIENT
Start: 2019-10-04 | End: 2019-10-11 | Stop reason: ALTCHOICE

## 2019-10-04 NOTE — PROGRESS NOTES
Chief Complaint   Patient presents with    Side Pain     Pt having L side pain. 1. Have you been to the ER, urgent care clinic since your last visit? Hospitalized since your last visit? No    2. Have you seen or consulted any other health care providers outside of the [address] [place] [address] since your last visit? Include any pap smears or colon screening.  No

## 2019-10-04 NOTE — PROGRESS NOTES
HISTORY OF PRESENT ILLNESS  Tova Gibbons is a 79 y.o. male. llq abdominal and flank pain x 3 days. No fever or chill,melena brbpr,no hematuria  Side Pain   The history is provided by the patient. This is a new problem. The current episode started more than 2 days ago. The problem occurs daily. The problem has been gradually worsening. Associated symptoms include abdominal pain. Pertinent negatives include no chest pain. Review of Systems   Constitutional: Negative for chills and fever. Cardiovascular: Negative for chest pain. Gastrointestinal: Positive for abdominal pain. Negative for blood in stool, constipation, diarrhea and melena. Genitourinary: Negative for dysuria, frequency and hematuria. Physical Exam   Constitutional: He appears well-developed and well-nourished. HENT:   Head: Normocephalic and atraumatic. Right Ear: External ear normal.   Left Ear: External ear normal.   Nose: Nose normal.   Mouth/Throat: Oropharynx is clear and moist.   Cardiovascular: Normal rate and regular rhythm. Pulmonary/Chest: Effort normal and breath sounds normal.   Abdominal: Soft. Bowel sounds are normal.   Mild direct LLq tenderness   Musculoskeletal:        Lumbar back: He exhibits tenderness. He exhibits normal range of motion and no bony tenderness. Back:        ASSESSMENT and PLAN  Diagnoses and all orders for this visit:    1. LLQ abdominal pain,likely mild diverticulitis  -     METABOLIC PANEL, COMPREHENSIVE  -     AMB POC URINALYSIS DIP STICK AUTO W/ MICRO  -     AMB POC COMPLETE CBC, AUTOMATED  -     metroNIDAZOLE (FLAGYL) 250 mg tablet; Take 1 Tab by mouth three (3) times daily for 7 days. -     ciprofloxacin HCl (CIPRO) 500 mg tablet; Take 1 Tab by mouth two (2) times a day for 7 days. 2. Flank pain, acute    To call prn failure to improve      Follow-up and Dispositions    · Return if symptoms worsen or fail to improve.

## 2019-10-05 LAB
ALBUMIN SERPL-MCNC: 4.1 G/DL (ref 3.6–4.8)
ALBUMIN/GLOB SERPL: 1.5 {RATIO} (ref 1.2–2.2)
ALP SERPL-CCNC: 84 IU/L (ref 39–117)
ALT SERPL-CCNC: 26 IU/L (ref 0–44)
AST SERPL-CCNC: 21 IU/L (ref 0–40)
BILIRUB SERPL-MCNC: 0.7 MG/DL (ref 0–1.2)
BUN SERPL-MCNC: 9 MG/DL (ref 8–27)
BUN/CREAT SERPL: 9 (ref 10–24)
CALCIUM SERPL-MCNC: 9.7 MG/DL (ref 8.6–10.2)
CHLORIDE SERPL-SCNC: 98 MMOL/L (ref 96–106)
CO2 SERPL-SCNC: 26 MMOL/L (ref 20–29)
CREAT SERPL-MCNC: 1.04 MG/DL (ref 0.76–1.27)
GLOBULIN SER CALC-MCNC: 2.7 G/DL (ref 1.5–4.5)
GLUCOSE SERPL-MCNC: 157 MG/DL (ref 65–99)
POTASSIUM SERPL-SCNC: 3.8 MMOL/L (ref 3.5–5.2)
PROT SERPL-MCNC: 6.8 G/DL (ref 6–8.5)
SODIUM SERPL-SCNC: 140 MMOL/L (ref 134–144)

## 2019-10-07 ENCOUNTER — HOSPITAL ENCOUNTER (EMERGENCY)
Age: 69
Discharge: HOME | End: 2019-10-07
Attending: EMERGENCY MEDICINE | Admitting: EMERGENCY MEDICINE
Payer: MEDICARE

## 2019-10-07 ENCOUNTER — APPOINTMENT (OUTPATIENT)
Dept: CT IMAGING | Age: 69
End: 2019-10-07
Attending: PHYSICIAN ASSISTANT
Payer: MEDICARE

## 2019-10-07 ENCOUNTER — TELEPHONE (OUTPATIENT)
Dept: FAMILY MEDICINE CLINIC | Age: 69
End: 2019-10-07

## 2019-10-07 VITALS
OXYGEN SATURATION: 99 % | HEART RATE: 72 BPM | RESPIRATION RATE: 16 BRPM | TEMPERATURE: 98.4 F | SYSTOLIC BLOOD PRESSURE: 163 MMHG | DIASTOLIC BLOOD PRESSURE: 86 MMHG

## 2019-10-07 DIAGNOSIS — R10.9 FLANK PAIN, ACUTE: Primary | ICD-10-CM

## 2019-10-07 DIAGNOSIS — M54.42 ACUTE LEFT-SIDED LOW BACK PAIN WITH LEFT-SIDED SCIATICA: Primary | ICD-10-CM

## 2019-10-07 LAB
ALBUMIN SERPL-MCNC: 3.8 G/DL (ref 3.5–5)
ALBUMIN/GLOB SERPL: 0.9 {RATIO} (ref 1.1–2.2)
ALP SERPL-CCNC: 89 U/L (ref 45–117)
ALT SERPL-CCNC: 34 U/L (ref 12–78)
ANION GAP SERPL CALC-SCNC: 7 MMOL/L (ref 5–15)
APPEARANCE UR: CLEAR
AST SERPL-CCNC: 25 U/L (ref 15–37)
BACTERIA URNS QL MICRO: NEGATIVE /HPF
BASOPHILS # BLD: 0.1 K/UL (ref 0–0.1)
BASOPHILS NFR BLD: 1 % (ref 0–1)
BILIRUB SERPL-MCNC: 0.6 MG/DL (ref 0.2–1)
BILIRUB UR QL: NEGATIVE
BUN SERPL-MCNC: 8 MG/DL (ref 6–20)
BUN/CREAT SERPL: 8 (ref 12–20)
CALCIUM SERPL-MCNC: 9.3 MG/DL (ref 8.5–10.1)
CHLORIDE SERPL-SCNC: 104 MMOL/L (ref 97–108)
CO2 SERPL-SCNC: 27 MMOL/L (ref 21–32)
COLOR UR: NORMAL
CREAT SERPL-MCNC: 1.03 MG/DL (ref 0.7–1.3)
DIFFERENTIAL METHOD BLD: ABNORMAL
EOSINOPHIL # BLD: 0.2 K/UL (ref 0–0.4)
EOSINOPHIL NFR BLD: 2 % (ref 0–7)
EPITH CASTS URNS QL MICRO: NORMAL /LPF
ERYTHROCYTE [DISTWIDTH] IN BLOOD BY AUTOMATED COUNT: 13.9 % (ref 11.5–14.5)
GLOBULIN SER CALC-MCNC: 4.3 G/DL (ref 2–4)
GLUCOSE SERPL-MCNC: 124 MG/DL (ref 65–100)
GLUCOSE UR STRIP.AUTO-MCNC: NEGATIVE MG/DL
HCT VFR BLD AUTO: 43.7 % (ref 36.6–50.3)
HGB BLD-MCNC: 14.2 G/DL (ref 12.1–17)
HGB UR QL STRIP: NEGATIVE
IMM GRANULOCYTES # BLD AUTO: 0 K/UL (ref 0–0.04)
IMM GRANULOCYTES NFR BLD AUTO: 1 % (ref 0–0.5)
KETONES UR QL STRIP.AUTO: NEGATIVE MG/DL
LEUKOCYTE ESTERASE UR QL STRIP.AUTO: NEGATIVE
LIPASE SERPL-CCNC: 88 U/L (ref 73–393)
LYMPHOCYTES # BLD: 1.8 K/UL (ref 0.8–3.5)
LYMPHOCYTES NFR BLD: 23 % (ref 12–49)
MCH RBC QN AUTO: 29.3 PG (ref 26–34)
MCHC RBC AUTO-ENTMCNC: 32.5 G/DL (ref 30–36.5)
MCV RBC AUTO: 90.3 FL (ref 80–99)
MONOCYTES # BLD: 0.7 K/UL (ref 0–1)
MONOCYTES NFR BLD: 9 % (ref 5–13)
NEUTS SEG # BLD: 5 K/UL (ref 1.8–8)
NEUTS SEG NFR BLD: 64 % (ref 32–75)
NITRITE UR QL STRIP.AUTO: NEGATIVE
NRBC # BLD: 0 K/UL (ref 0–0.01)
NRBC BLD-RTO: 0 PER 100 WBC
PH UR STRIP: 7.5 [PH] (ref 5–8)
PLATELET # BLD AUTO: 298 K/UL (ref 150–400)
PMV BLD AUTO: 10.6 FL (ref 8.9–12.9)
POTASSIUM SERPL-SCNC: 3.8 MMOL/L (ref 3.5–5.1)
PROT SERPL-MCNC: 8.1 G/DL (ref 6.4–8.2)
PROT UR STRIP-MCNC: NEGATIVE MG/DL
RBC # BLD AUTO: 4.84 M/UL (ref 4.1–5.7)
RBC #/AREA URNS HPF: NORMAL /HPF (ref 0–5)
SODIUM SERPL-SCNC: 138 MMOL/L (ref 136–145)
SP GR UR REFRACTOMETRY: 1 (ref 1–1.03)
UROBILINOGEN UR QL STRIP.AUTO: 0.2 EU/DL (ref 0.2–1)
WBC # BLD AUTO: 7.7 K/UL (ref 4.1–11.1)
WBC URNS QL MICRO: NORMAL /HPF (ref 0–4)

## 2019-10-07 PROCEDURE — 74011000250 HC RX REV CODE- 250: Performed by: PHYSICIAN ASSISTANT

## 2019-10-07 PROCEDURE — 81003 URINALYSIS AUTO W/O SCOPE: CPT

## 2019-10-07 PROCEDURE — 36415 COLL VENOUS BLD VENIPUNCTURE: CPT

## 2019-10-07 PROCEDURE — 74011250636 HC RX REV CODE- 250/636: Performed by: PHYSICIAN ASSISTANT

## 2019-10-07 PROCEDURE — 96374 THER/PROPH/DIAG INJ IV PUSH: CPT

## 2019-10-07 PROCEDURE — 99283 EMERGENCY DEPT VISIT LOW MDM: CPT

## 2019-10-07 PROCEDURE — 74011250637 HC RX REV CODE- 250/637: Performed by: PHYSICIAN ASSISTANT

## 2019-10-07 PROCEDURE — 85025 COMPLETE CBC W/AUTO DIFF WBC: CPT

## 2019-10-07 PROCEDURE — 74176 CT ABD & PELVIS W/O CONTRAST: CPT

## 2019-10-07 PROCEDURE — 83690 ASSAY OF LIPASE: CPT

## 2019-10-07 PROCEDURE — 80053 COMPREHEN METABOLIC PANEL: CPT

## 2019-10-07 RX ORDER — BRIMONIDINE TARTRATE, TIMOLOL MALEATE 2; 5 MG/ML; MG/ML
1 SOLUTION/ DROPS OPHTHALMIC 3 TIMES DAILY
COMMUNITY

## 2019-10-07 RX ORDER — METHOCARBAMOL 750 MG/1
750 TABLET, FILM COATED ORAL 4 TIMES DAILY
Qty: 10 TAB | Refills: 0 | Status: SHIPPED | OUTPATIENT
Start: 2019-10-07 | End: 2020-10-20

## 2019-10-07 RX ORDER — FENTANYL CITRATE 50 UG/ML
50 INJECTION, SOLUTION INTRAMUSCULAR; INTRAVENOUS
Status: COMPLETED | OUTPATIENT
Start: 2019-10-07 | End: 2019-10-07

## 2019-10-07 RX ORDER — METHOCARBAMOL 750 MG/1
750 TABLET, FILM COATED ORAL 4 TIMES DAILY
Qty: 10 TAB | Refills: 0 | Status: SHIPPED | OUTPATIENT
Start: 2019-10-07 | End: 2019-10-07

## 2019-10-07 RX ORDER — METHOCARBAMOL 750 MG/1
750 TABLET, FILM COATED ORAL
Status: COMPLETED | OUTPATIENT
Start: 2019-10-07 | End: 2019-10-07

## 2019-10-07 RX ORDER — LIDOCAINE 50 MG/G
1 PATCH TOPICAL
Status: DISCONTINUED | OUTPATIENT
Start: 2019-10-07 | End: 2019-10-07 | Stop reason: HOSPADM

## 2019-10-07 RX ORDER — LIDOCAINE 50 MG/G
PATCH TOPICAL
Qty: 15 EACH | Refills: 0 | Status: SHIPPED | OUTPATIENT
Start: 2019-10-07 | End: 2021-03-22 | Stop reason: ALTCHOICE

## 2019-10-07 RX ORDER — MELOXICAM 15 MG/1
15 TABLET ORAL DAILY
Qty: 20 TAB | Refills: 1 | Status: SHIPPED | OUTPATIENT
Start: 2019-10-07 | End: 2019-11-12 | Stop reason: SDUPTHER

## 2019-10-07 RX ORDER — DICLOFENAC SODIUM 10 MG/G
4 GEL TOPICAL 4 TIMES DAILY
COMMUNITY
End: 2021-10-22 | Stop reason: ALTCHOICE

## 2019-10-07 RX ORDER — LIDOCAINE 50 MG/G
PATCH TOPICAL
Qty: 15 EACH | Refills: 0 | Status: SHIPPED | OUTPATIENT
Start: 2019-10-07 | End: 2019-10-07

## 2019-10-07 RX ADMIN — FENTANYL CITRATE 50 MCG: 50 INJECTION INTRAMUSCULAR; INTRAVENOUS at 08:20

## 2019-10-07 RX ADMIN — SODIUM CHLORIDE 1000 ML: 900 INJECTION, SOLUTION INTRAVENOUS at 08:21

## 2019-10-07 RX ADMIN — METHOCARBAMOL 750 MG: 750 TABLET ORAL at 08:20

## 2019-10-07 NOTE — DISCHARGE INSTRUCTIONS
Patient Education        Back Pain: Care Instructions  Your Care Instructions    Back pain has many possible causes. It is often related to problems with muscles and ligaments of the back. It may also be related to problems with the nerves, discs, or bones of the back. Moving, lifting, standing, sitting, or sleeping in an awkward way can strain the back. Sometimes you don't notice the injury until later. Arthritis is another common cause of back pain. Although it may hurt a lot, back pain usually improves on its own within several weeks. Most people recover in 12 weeks or less. Using good home treatment and being careful not to stress your back can help you feel better sooner. Follow-up care is a key part of your treatment and safety. Be sure to make and go to all appointments, and call your doctor if you are having problems. It's also a good idea to know your test results and keep a list of the medicines you take. How can you care for yourself at home? · Sit or lie in positions that are most comfortable and reduce your pain. Try one of these positions when you lie down:  ? Lie on your back with your knees bent and supported by large pillows. ? Lie on the floor with your legs on the seat of a sofa or chair. ? Lie on your side with your knees and hips bent and a pillow between your legs. ? Lie on your stomach if it does not make pain worse. · Do not sit up in bed, and avoid soft couches and twisted positions. Bed rest can help relieve pain at first, but it delays healing. Avoid bed rest after the first day of back pain. · Change positions every 30 minutes. If you must sit for long periods of time, take breaks from sitting. Get up and walk around, or lie in a comfortable position. · Try using a heating pad on a low or medium setting for 15 to 20 minutes every 2 or 3 hours. Try a warm shower in place of one session with the heating pad. · You can also try an ice pack for 10 to 15 minutes every 2 to 3 hours. Put a thin cloth between the ice pack and your skin. · Take pain medicines exactly as directed. ? If the doctor gave you a prescription medicine for pain, take it as prescribed. ? If you are not taking a prescription pain medicine, ask your doctor if you can take an over-the-counter medicine. · Take short walks several times a day. You can start with 5 to 10 minutes, 3 or 4 times a day, and work up to longer walks. Walk on level surfaces and avoid hills and stairs until your back is better. · Return to work and other activities as soon as you can. Continued rest without activity is usually not good for your back. · To prevent future back pain, do exercises to stretch and strengthen your back and stomach. Learn how to use good posture, safe lifting techniques, and proper body mechanics. When should you call for help? Call your doctor now or seek immediate medical care if:    · You have new or worsening numbness in your legs.     · You have new or worsening weakness in your legs. (This could make it hard to stand up.)     · You lose control of your bladder or bowels.    Watch closely for changes in your health, and be sure to contact your doctor if:    · You have a fever, lose weight, or don't feel well.     · You do not get better as expected. Where can you learn more? Go to http://treasure-luis.info/. Enter T289 in the search box to learn more about \"Back Pain: Care Instructions. \"  Current as of: June 26, 2019  Content Version: 12.2  © 4120-2390 [employer]. Care instructions adapted under license by [employer] (which disclaims liability or warranty for this information). If you have questions about a medical condition or this instruction, always ask your healthcare professional. [place] [address] any warranty or liability for your use of this information.

## 2019-10-07 NOTE — ED NOTES
Bedside shift change report given to stephon nix  (oncoming nurse) by kOsana Phillips  (offgoing nurse). Report included the following information ED Summary.

## 2019-10-07 NOTE — ED PROVIDER NOTES
This is a 80 yo AAM with medical hx remarkable for allergic rhinitis, asthma, cervical radiculitis, essential hypertension, mixed hyperlipidemia, and OA presenting with complaint of left flank pain for past 6 days. Pain is described as waxing and waning. Was initially radiating into the LLQ but has improved. Now notices some radiation into the left lower extremity. Associated diarrhea. Pain is worse with ambulating. Taking Advil, last dose was last night, with limited improvement. Feels similar to diverticulitis. Saw PCP 5 days ago and was prescribed flagyl and cipro with minimal improvement except abdominal pain has lightened. No fever, chills, HA, CP, SOB, constipation, dysuria, urinary frequency, hesitancy, saddle paresthesia, urinary incontinence, bowel incontinence or melena.              Past Medical History:   Diagnosis Date    Allergic rhinitis, cause unspecified 11/4/2010    Asthma 11/4/2010    Cervical radiculitis 11/9/2012    Encounter for long-term (current) use of other medications 11/26/2010    Essential hypertension, benign 11/4/2010    Glaucoma 11/4/2010    Hypertension     Mixed hyperlipidemia 5/27/2011    Nocturia 11/9/2012    OA (osteoarthritis) 11/26/2010       Past Surgical History:   Procedure Laterality Date    HX COLONOSCOPY      NASAL SCOPY,OPEN MAXILL SINUS           Family History:   Problem Relation Age of Onset    Heart Disease Mother     Hypertension Mother     Heart Disease Father     No Known Problems Brother     Cancer Brother        Social History     Socioeconomic History    Marital status:      Spouse name: Not on file    Number of children: Not on file    Years of education: Not on file    Highest education level: Not on file   Occupational History    Not on file   Social Needs    Financial resource strain: Not on file    Food insecurity:     Worry: Not on file     Inability: Not on file    Transportation needs:     Medical: Not on file Non-medical: Not on file   Tobacco Use    Smoking status: Never Smoker    Smokeless tobacco: Never Used   Substance and Sexual Activity    Alcohol use: No    Drug use: No    Sexual activity: Yes     Partners: Female   Lifestyle    Physical activity:     Days per week: Not on file     Minutes per session: Not on file    Stress: Not on file   Relationships    Social connections:     Talks on phone: Not on file     Gets together: Not on file     Attends Judaism service: Not on file     Active member of club or organization: Not on file     Attends meetings of clubs or organizations: Not on file     Relationship status: Not on file    Intimate partner violence:     Fear of current or ex partner: Not on file     Emotionally abused: Not on file     Physically abused: Not on file     Forced sexual activity: Not on file   Other Topics Concern    Not on file   Social History Narrative    Not on file         ALLERGIES: Diclofenac; Propoxyphene-acetaminophen; Sulfa (sulfonamide antibiotics); Sulindac; and Zithromax [azithromycin]    Review of Systems   Constitutional: Negative. Negative for chills and fever. HENT: Negative for congestion, ear pain, rhinorrhea and sore throat. Eyes: Negative. Respiratory: Negative for cough, chest tightness and shortness of breath. Cardiovascular: Negative for chest pain. Gastrointestinal: Positive for abdominal pain and diarrhea. Negative for constipation and vomiting. Genitourinary: Positive for flank pain. Negative for difficulty urinating, dysuria, frequency and urgency. Musculoskeletal: Positive for arthralgias. Negative for joint swelling. Neurological: Negative for weakness, numbness and headaches. All other systems reviewed and are negative. Vitals:    10/07/19 0730 10/07/19 0736   BP:  174/90   Pulse: 96 71   Resp:  18   Temp:  98.2 °F (36.8 °C)   SpO2: 96% 98%            Physical Exam   Constitutional: He is oriented to person, place, and time. He appears well-developed and well-nourished. No distress. AAM appears to be in pain   HENT:   Head: Normocephalic and atraumatic. Right Ear: External ear normal.   Left Ear: External ear normal.   Nose: Nose normal.   Mouth/Throat: Oropharynx is clear and moist. No oropharyngeal exudate. Eyes: Pupils are equal, round, and reactive to light. Conjunctivae and EOM are normal. Right eye exhibits no discharge. Left eye exhibits no discharge. Neck: Normal range of motion. Neck supple. Cardiovascular: Normal rate, regular rhythm and normal heart sounds. Pulmonary/Chest: Effort normal and breath sounds normal. He has no wheezes. He has no rales. Abdominal: Soft. Bowel sounds are normal. He exhibits no distension. There is no tenderness. There is no guarding. Musculoskeletal: Normal range of motion. Back:    Lymphadenopathy:     He has no cervical adenopathy. Neurological: He is alert and oriented to person, place, and time. No cranial nerve deficit. Skin: Skin is warm and dry. He is not diaphoretic. Psychiatric: He has a normal mood and affect. His behavior is normal.   Nursing note and vitals reviewed. MDM  Number of Diagnoses or Management Options  Diagnosis management comments: 80 yo AAM with complaint of left flank pain radiating into the abdomen and leg with positional exacerbation. He appears well with stable vitals. Abdomen is soft and non-tender. Has some left flank/muscular tenderness on exam.  DDx includes diverticular disease vs pancreatitis vs UTI vs obstructive uropathy vs MSK pain  Plan  CBC  CMP  Lipase  UA  CT abd/pel  Analgesia  Reassess. April C Benson, [place]         Amount and/or Complexity of Data Reviewed  Clinical lab tests: reviewed and ordered  Tests in the radiology section of CPT®: ordered and reviewed  Independent visualization of images, tracings, or specimens: yes           Procedures  Progress note        Labs and imaging reviewed. Electrolytes stable.  UA clear. Ct - for acute findings DJD noted in L spine. Suspect MSK pain. Jessica BabbOrange, [place]    Patient's results have been reviewed with them. Patient and/or family have verbally conveyed their understanding and agreement of the patient's signs, symptoms, diagnosis, treatment and prognosis and additionally agree to follow up as recommended or return to the Emergency Room should their condition change prior to follow-up. Discharge instructions have also been provided to the patient with some educational information regarding their diagnosis as well a list of reasons why they would want to return to the ER prior to their follow-up appointment should their condition change.  Jessica Wolf [place]

## 2019-10-09 DIAGNOSIS — I10 ESSENTIAL HYPERTENSION, BENIGN: ICD-10-CM

## 2019-10-09 RX ORDER — HYDROCHLOROTHIAZIDE 12.5 MG/1
TABLET ORAL
Qty: 90 TAB | Refills: 3 | Status: SHIPPED | OUTPATIENT
Start: 2019-10-09 | End: 2020-10-01

## 2019-10-10 ENCOUNTER — TELEPHONE (OUTPATIENT)
Dept: FAMILY MEDICINE CLINIC | Age: 69
End: 2019-10-10

## 2019-10-10 DIAGNOSIS — M54.32 SCIATICA OF LEFT SIDE: Primary | ICD-10-CM

## 2019-10-10 RX ORDER — PREDNISONE 10 MG/1
TABLET ORAL
Qty: 21 TAB | Refills: 0 | Status: SHIPPED | OUTPATIENT
Start: 2019-10-10 | End: 2020-05-07 | Stop reason: ALTCHOICE

## 2019-10-10 NOTE — TELEPHONE ENCOUNTER
Patient wants a return call regarding having pain in his right leg & in back.  Please give a call @ [phone]

## 2019-10-11 ENCOUNTER — OFFICE VISIT (OUTPATIENT)
Dept: FAMILY MEDICINE CLINIC | Age: 69
End: 2019-10-11

## 2019-10-11 VITALS
TEMPERATURE: 98.3 F | DIASTOLIC BLOOD PRESSURE: 82 MMHG | WEIGHT: 264 LBS | HEIGHT: 72 IN | RESPIRATION RATE: 18 BRPM | SYSTOLIC BLOOD PRESSURE: 128 MMHG | OXYGEN SATURATION: 95 % | HEART RATE: 85 BPM | BODY MASS INDEX: 35.76 KG/M2

## 2019-10-11 DIAGNOSIS — M54.42 ACUTE LEFT-SIDED LOW BACK PAIN WITH LEFT-SIDED SCIATICA: Primary | ICD-10-CM

## 2019-10-11 RX ORDER — HYDROCODONE BITARTRATE AND ACETAMINOPHEN 5; 325 MG/1; MG/1
1 TABLET ORAL
Qty: 20 TAB | Refills: 0 | Status: SHIPPED | OUTPATIENT
Start: 2019-10-11 | End: 2019-10-14

## 2019-10-11 NOTE — PROGRESS NOTES
HISTORY OF PRESENT ILLNESS  Nakul Roach is a 79 y.o. male. 5 days worsening flank pain with development of severe LLExt radiation 2 days ago. Seen in ER . Somewhat better since starting prednisone. Having difficulty ambulating  Hospital Follow Up   The history is provided by the patient. This is a new problem. The current episode started yesterday. The problem occurs daily. The problem has been rapidly improving. Pertinent negatives include no abdominal pain. Back Pain    The history is provided by the patient. This is a new problem. The current episode started more than 2 days ago. The problem has been gradually improving. The problem occurs hourly. The pain is associated with no known injury. The pain is present in the lower back. The quality of the pain is described as aching. The pain radiates to the left knee. The pain is at a severity of 7/10. The pain is moderate. The symptoms are aggravated by certain positions. The pain is worse during the day. Associated symptoms include pelvic pain and tingling. Pertinent negatives include no fever, no abdominal pain, no paresthesias, no paresis and no weakness. Review of Systems   Constitutional: Negative for fever. Gastrointestinal: Negative for abdominal pain. Genitourinary: Positive for pelvic pain. Musculoskeletal: Positive for back pain. Negative for joint pain. Skin: Negative for rash. Neurological: Positive for tingling. Negative for weakness and paresthesias. Physical Exam   Constitutional: He appears well-developed and well-nourished. He appears distressed. HENT:   Head: Normocephalic and atraumatic. Right Ear: External ear normal.   Left Ear: External ear normal.   Nose: Nose normal.   Mouth/Throat: Oropharynx is clear and moist.   Pulmonary/Chest: Effort normal and breath sounds normal.   Abdominal: Soft.  Bowel sounds are normal.   Musculoskeletal:        Lumbar back: He exhibits decreased range of motion, tenderness and bony tenderness. He exhibits no deformity. Back:    Positive SLR ,RT       ASSESSMENT and PLAN  Diagnoses and all orders for this visit:    1. Acute left-sided low back pain with left-sided sciatica  -     HYDROcodone-acetaminophen (NORCO) 5-325 mg per tablet; Take 1 Tab by mouth every eight (8) hours as needed for Pain for up to 3 days. Max Daily Amount: 3 Tabs. Follow-up and Dispositions    · Return if symptoms worsen or fail to improve.

## 2019-10-11 NOTE — PROGRESS NOTES
Chief Complaint   Patient presents with   [facility] Follow Up     F/U from ED [facility] emergency for back and L leg pain. 1. Have you been to the ER, urgent care clinic since your last visit? Hospitalized since your last visit? No    2. Have you seen or consulted any other health care providers outside of the [address] [place] [address] since your last visit? Include any pap smears or colon screening.  No

## 2019-10-15 ENCOUNTER — OFFICE VISIT (OUTPATIENT)
Dept: FAMILY MEDICINE CLINIC | Age: 69
End: 2019-10-15

## 2019-10-15 VITALS
RESPIRATION RATE: 18 BRPM | SYSTOLIC BLOOD PRESSURE: 132 MMHG | BODY MASS INDEX: 35.76 KG/M2 | WEIGHT: 264 LBS | TEMPERATURE: 98.3 F | HEART RATE: 66 BPM | HEIGHT: 72 IN | OXYGEN SATURATION: 98 % | DIASTOLIC BLOOD PRESSURE: 86 MMHG

## 2019-10-15 DIAGNOSIS — M54.42 ACUTE LEFT-SIDED LOW BACK PAIN WITH LEFT-SIDED SCIATICA: Primary | ICD-10-CM

## 2019-10-15 RX ORDER — PREDNISONE 10 MG/1
TABLET ORAL
Qty: 21 TAB | Refills: 0 | Status: SHIPPED | OUTPATIENT
Start: 2019-10-15 | End: 2020-05-07 | Stop reason: ALTCHOICE

## 2019-10-15 NOTE — PROGRESS NOTES
HISTORY OF PRESENT ILLNESS  Jules Gautam is a 79 y.o. male. Lumbago with l sciatica somewhat better since starting prednisone,about to complete dosepack. Having difficulty ambulating  Back Pain    The history is provided by the patient. This is a new problem. The current episode started more than 1 week ago. The problem has been gradually improving. The problem occurs hourly. The pain is associated with no known injury. The pain is present in the lower back. The quality of the pain is described as aching. The pain radiates to the left knee. The pain is at a severity of 7/10. The pain is moderate. The symptoms are aggravated by certain positions. The pain is worse during the day. Associated symptoms include pelvic pain and tingling. Pertinent negatives include no fever, no paresthesias, no paresis and no weakness. Review of Systems   Constitutional: Negative for fever. Genitourinary: Positive for pelvic pain. Musculoskeletal: Positive for back pain. Negative for joint pain. Skin: Negative for rash. Neurological: Positive for tingling. Negative for weakness and paresthesias. Physical Exam   Constitutional: He is oriented to person, place, and time. He appears well-developed and well-nourished. HENT:   Head: Normocephalic and atraumatic. Right Ear: External ear normal.   Left Ear: External ear normal.   Nose: Nose normal.   Mouth/Throat: Oropharynx is clear and moist.   Pulmonary/Chest: Effort normal and breath sounds normal.   Abdominal: Soft. Bowel sounds are normal.   Musculoskeletal:        Lumbar back: He exhibits decreased range of motion, tenderness and bony tenderness. He exhibits no deformity. Back:    Positive SLR ,RT   Neurological: He is alert and oriented to person, place, and time. He has normal reflexes. Skin: Skin is warm and dry. ASSESSMENT and PLAN  Diagnoses and all orders for this visit:    1.  Acute left-sided low back pain with left-sided sciatica,slowly improving will repeat prednisone dosepack,continue bedreast,call prn  -     predniSONE (STERAPRED DS) 10 mg dose pack; See administration instruction per 10mg dose pack      Follow-up and Dispositions    · Return in about 4 weeks (around 11/12/2019).

## 2019-10-15 NOTE — PROGRESS NOTES
Chief Complaint   Patient presents with    Back Pain     F/U on back and L leg pain. 1. Have you been to the ER, urgent care clinic since your last visit? Hospitalized since your last visit? No    2. Have you seen or consulted any other health care providers outside of the [address] [place] [address] since your last visit? Include any pap smears or colon screening.  No

## 2019-10-23 DIAGNOSIS — M54.42 ACUTE LEFT-SIDED LOW BACK PAIN WITH LEFT-SIDED SCIATICA: Primary | ICD-10-CM

## 2019-10-23 NOTE — PROGRESS NOTES
Discussed with patient,having ongoing disabling lumbago with l sciatica,will schedule mri,ortho spine appt

## 2019-10-30 ENCOUNTER — HOSPITAL ENCOUNTER (OUTPATIENT)
Dept: MRI IMAGING | Age: 69
Discharge: HOME | End: 2019-10-30
Attending: FAMILY MEDICINE
Payer: MEDICARE

## 2019-10-30 DIAGNOSIS — M54.42 ACUTE LEFT-SIDED LOW BACK PAIN WITH LEFT-SIDED SCIATICA: ICD-10-CM

## 2019-10-30 PROCEDURE — 72148 MRI LUMBAR SPINE W/O DYE: CPT

## 2019-11-12 RX ORDER — MELOXICAM 15 MG/1
TABLET ORAL
Qty: 20 TAB | Refills: 1 | Status: SHIPPED | OUTPATIENT
Start: 2019-11-12 | End: 2020-02-14

## 2019-12-17 DIAGNOSIS — E78.2 MIXED HYPERLIPIDEMIA: ICD-10-CM

## 2019-12-17 RX ORDER — ATORVASTATIN CALCIUM 10 MG/1
10 TABLET, FILM COATED ORAL DAILY
Qty: 90 TAB | Refills: 3 | Status: SHIPPED | OUTPATIENT
Start: 2019-12-17 | End: 2021-05-02

## 2020-01-06 RX ORDER — RAMIPRIL 10 MG/1
CAPSULE ORAL
Qty: 180 CAP | Refills: 3 | Status: SHIPPED | OUTPATIENT
Start: 2020-01-06 | End: 2020-12-18

## 2020-02-14 RX ORDER — MELOXICAM 15 MG/1
TABLET ORAL
Qty: 20 TAB | Refills: 1 | Status: SHIPPED | OUTPATIENT
Start: 2020-02-14 | End: 2020-08-31 | Stop reason: ALTCHOICE

## 2020-03-09 DIAGNOSIS — I10 ESSENTIAL HYPERTENSION, BENIGN: ICD-10-CM

## 2020-03-09 RX ORDER — DILTIAZEM HYDROCHLORIDE 240 MG/1
CAPSULE, COATED, EXTENDED RELEASE ORAL
Qty: 90 CAP | Refills: 3 | Status: SHIPPED | OUTPATIENT
Start: 2020-03-09 | End: 2020-06-08

## 2020-04-25 DIAGNOSIS — M15.9 PRIMARY OSTEOARTHRITIS INVOLVING MULTIPLE JOINTS: ICD-10-CM

## 2020-04-25 DIAGNOSIS — S33.8XXA SPRAIN OF GROIN, INITIAL ENCOUNTER: ICD-10-CM

## 2020-04-27 RX ORDER — IBUPROFEN 800 MG/1
TABLET ORAL
Qty: 50 TAB | Refills: 6 | Status: SHIPPED | OUTPATIENT
Start: 2020-04-27 | End: 2020-10-06

## 2020-05-07 ENCOUNTER — VIRTUAL VISIT (OUTPATIENT)
Dept: FAMILY MEDICINE CLINIC | Age: 70
End: 2020-05-07

## 2020-05-07 DIAGNOSIS — K21.00 GASTROESOPHAGEAL REFLUX DISEASE WITH ESOPHAGITIS: ICD-10-CM

## 2020-05-07 DIAGNOSIS — E78.2 MIXED HYPERLIPIDEMIA: ICD-10-CM

## 2020-05-07 DIAGNOSIS — Z00.00 MEDICARE ANNUAL WELLNESS VISIT, SUBSEQUENT: Primary | ICD-10-CM

## 2020-05-07 DIAGNOSIS — J45.20 MILD INTERMITTENT ASTHMA WITHOUT COMPLICATION: ICD-10-CM

## 2020-05-07 DIAGNOSIS — I10 ESSENTIAL HYPERTENSION, BENIGN: ICD-10-CM

## 2020-05-07 RX ORDER — FAMOTIDINE 40 MG/1
40 TABLET, FILM COATED ORAL DAILY
Qty: 30 TAB | Refills: 5 | Status: SHIPPED | OUTPATIENT
Start: 2020-05-07 | End: 2020-08-31 | Stop reason: SDUPTHER

## 2020-05-07 RX ORDER — ALBUTEROL SULFATE 90 UG/1
2 AEROSOL, METERED RESPIRATORY (INHALATION)
Qty: 1 INHALER | Refills: 11 | Status: SHIPPED | OUTPATIENT
Start: 2020-05-07

## 2020-05-07 NOTE — PROGRESS NOTES
This is the Subsequent Medicare Annual Wellness Exam, performed 12 months or more after the Initial AWV or the last Subsequent AWV. He reports doing well,with no c/o  Patient encounter by synchronous (real time) audio-visual technology which is patient initiated. The Patient is aware that this encounter is a billable service with coverage determined by their insurance carrier,as discussed at the time of check in. The patient has given verbal consent to proceed      I have reviewed the patient's medical history in detail and updated the computerized patient record. History     Patient Active Problem List   Diagnosis Code    Essential hypertension, benign I10    Glaucoma H40.9    Asthma J45.909    Allergic rhinitis J30.9    OA (osteoarthritis) M19.90    Encounter for long-term (current) use of other medications Z79.899    Mixed hyperlipidemia E78.2    Nocturia R35.1    Cervical radiculitis M54.12    GERD (gastroesophageal reflux disease) K21.9    Chronic maxillary sinusitis J32.0    Severe obesity ([address] E66.01     Past Medical History:   Diagnosis Date    Allergic rhinitis, cause unspecified 11/4/2010    Asthma 11/4/2010    Cervical radiculitis 11/9/2012    Encounter for long-term (current) use of other medications 11/26/2010    Essential hypertension, benign 11/4/2010    Glaucoma 11/4/2010    Hypertension     Mixed hyperlipidemia 5/27/2011    Nocturia 11/9/2012    OA (osteoarthritis) 11/26/2010      Past Surgical History:   Procedure Laterality Date    HX COLONOSCOPY      MS NASAL SCOPY,OPEN MAXILL SINUS       Current Outpatient Medications   Medication Sig Dispense Refill    ibuprofen (MOTRIN) 800 mg tablet TAKE 1 TABLET BY MOUTH EVERY 8 HOURS AS NEEDED FOR PAIN 50 Tab 6    dilTIAZem CD (CARDIZEM CD) 240 mg ER capsule TAKE 1 CAP BY MOUTH DAILY.  90 Cap 3    ramipril (ALTACE) 10 mg capsule TAKE 2 CAPSULES BY MOUTH EVERY  Cap 3    atorvastatin (LIPITOR) 10 mg tablet Take 1 Tab by mouth daily. 90 Tab 3    hydroCHLOROthiazide (HYDRODIURIL) 12.5 mg tablet TAKE 1 TABLET BY MOUTH EVERY DAY 90 Tab 3    brimonidine-timolol (COMBIGAN) 0.2-0.5 % drop ophthalmic solution Administer 1 Drop to both eyes three (3) times daily.  famotidine (PEPCID) 40 mg tablet TAKE 1 TABLET BY MOUTH EVERY DAY 30 Tab 5    albuterol (PROAIR HFA) 90 mcg/actuation inhaler Take 2 Puffs by inhalation every six (6) hours as needed for Wheezing. 1 Inhaler 11    montelukast (SINGULAIR) 10 mg tablet Take 10 mg by mouth daily.  triamcinolone acetonide (KENALOG) 0.1 % topical cream Apply  to affected area three (3) times daily as needed for Skin Irritation. 85 g 2    multivitamin (ONE A DAY) tablet Take 1 Tab by mouth daily.  sodium chloride irrigation 0.9 % irrigation IRRIGATE EACH NASAL CAVITY WITH 60CCS OF NORMAL SALINE SOLUTION DAILY. DISP: 1 LITER  10    SALINE NASAL 0.65 % nasal spray INHALE 2 SPRAYS IN EACH NOSTRIL 4 TIMES A DAY FOR 14 DAYS  2    LUMIGAN 0.01 % ophthalmic drops Administer 1 Drop to both eyes nightly. 11    aspirin 81 mg tablet Take 81 mg by mouth.  meloxicam (MOBIC) 15 mg tablet TAKE 1 TABLET BY MOUTH EVERY DAY 20 Tab 1    diclofenac (VOLTAREN) 1 % gel Apply 4 g to affected area four (4) times daily.  lidocaine (LIDODERM) 5 % Apply patch to the affected area for 12 hours a day and remove for 12 hours a day. 15 Each 0    methocarbamol (ROBAXIN-750) 750 mg tablet Take 1 Tab by mouth four (4) times daily. 10 Tab 0    EPINEPHrine (EPIPEN) 0.3 mg/0.3 mL injection INJECT INTRAMUSCULARLY ONCE. MAY REPEAT IF NECESSARY  0    meclizine (ANTIVERT) 12.5 mg tablet Take 1 Tab by mouth three (3) times daily as needed. 30 Tab 1    fexofenadine (ALLEGRA) 180 mg tablet Take  by mouth.  loteprednol etabonate (ALREX) 0.2 % drps Administer 1 Drop to both eyes four (4) times daily as needed.        Allergies   Allergen Reactions    Diclofenac Other (comments)     Ringing in ears    Propoxyphene-Acetaminophen Rash    Sulfa (Sulfonamide Antibiotics) Shortness of Breath     Pt states he is not allergic.  Sulindac Diarrhea    Zithromax [Azithromycin] Rash       Family History   Problem Relation Age of Onset    Heart Disease Mother     Hypertension Mother     Heart Disease Father     No Known Problems Brother     Cancer Brother      Social History     Tobacco Use    Smoking status: Never Smoker    Smokeless tobacco: Never Used   Substance Use Topics    Alcohol use: No       Depression Risk Factor Screening:     3 most recent PHQ Screens 2020   Little interest or pleasure in doing things Not at all   Feeling down, depressed, irritable, or hopeless Not at all   Total Score PHQ 2 0       Alcohol Risk Factor Screening (MALE > 65): Do you average more 1 drink per night or more than 7 drinks a week: No    In the past three months have you have had more than 4 drinks containing alcohol on one occasion: No      Functional Ability and Level of Safety:   Hearing: Hearing is good. Activities of Daily Living: The home contains: handrails and smoke alarm  Patient does total self care    Ambulation: with no difficulty    Fall Risk:  Fall Risk Assessment, last 12 mths 2020   Able to walk? Yes   Fall in past 12 months? No       Abuse Screen:  Patient is not abused    Cognitive Screening   Has your family/caregiver stated any concerns about your memory: no  Cognitive Screenin    Patient Care Team   Patient Care Team:  Sindi Cantu MD as PCP - [address] [place] [address], Joyce Webster MD as PCP - [facility] Provider  Other, MD Ary as Physician (Ophthalmology)    Assessment/Plan   Education and counseling provided:  Are appropriate based on today's review and evaluation    Diagnoses and all orders for this visit:    1. Medicare annual wellness visit, subsequent    2. Mild intermittent asthma without complication  -     albuterol (ProAir HFA) 90 mcg/actuation inhaler;  Take 2 Puffs by inhalation every six (6) hours as needed for Wheezing. 3. Gastroesophageal reflux disease with esophagitis  -     famotidine (PEPCID) 40 mg tablet; Take 1 Tab by mouth daily. 4. Essential hypertension, benign    5. Mixed hyperlipidemia        Health Maintenance Due   Topic Date Due    Shingrix Vaccine Age 49> (1 of 2) 11/08/2002    GLAUCOMA SCREENING Q2Y  11/08/2017    Medicare Yearly Exam  07/17/2019     Doing well,continue current meds and treatments  rtc 2 mo    Due to this being a telehealth encounter (During  [address] [facility]),evaluation of the following organ systems was limited:Vitals/Constitutional/EENT,Respiratory,CV,GI,,MS,Neuro,Skin /Heme-Lymph-Imm  Pursuant to the emergency declaration under the [address] [facility] [place] [address] waiver authority and the [employer] and [employer] General Act,this Virtual Visit was conducted ,with patient consent,to reduce the patients risk of exposure to Covid 19 and to provide continuity of care  for an established patient. Services were provided through a video synchronous discussion virtually to substitute for in person appointment. This visit was completed using D[employer]. me    Time in Virtual Visit:20    minutes

## 2020-05-07 NOTE — PROGRESS NOTES
Chief Complaint   Patient presents with    Well Male     Pt getting annual medicare wellness. 1. Have you been to the ER, urgent care clinic since your last visit? Hospitalized since your last visit? No    2. Have you seen or consulted any other health care providers outside of the [address] [place] [address] since your last visit? Include any pap smears or colon screening.  No

## 2020-06-08 ENCOUNTER — TELEPHONE (OUTPATIENT)
Dept: FAMILY MEDICINE CLINIC | Age: 70
End: 2020-06-08

## 2020-06-08 RX ORDER — AMLODIPINE BESYLATE 5 MG/1
5 TABLET ORAL DAILY
Qty: 30 TAB | Refills: 3 | Status: SHIPPED | OUTPATIENT
Start: 2020-06-08 | End: 2020-07-23 | Stop reason: DRUGHIGH

## 2020-06-08 NOTE — TELEPHONE ENCOUNTER
Pls call Phil Cerda with [facility]     RE: new script for amlodipine     States pt already takes diltiazem     Best number to reach her is [phone]

## 2020-07-23 ENCOUNTER — HOSPITAL ENCOUNTER (OUTPATIENT)
Dept: LAB | Age: 70
Discharge: HOME | End: 2020-07-23
Payer: MEDICARE

## 2020-07-23 ENCOUNTER — OFFICE VISIT (OUTPATIENT)
Dept: FAMILY MEDICINE CLINIC | Age: 70
End: 2020-07-23

## 2020-07-23 VITALS
BODY MASS INDEX: 35.92 KG/M2 | RESPIRATION RATE: 18 BRPM | TEMPERATURE: 97.5 F | OXYGEN SATURATION: 98 % | HEIGHT: 72 IN | DIASTOLIC BLOOD PRESSURE: 102 MMHG | SYSTOLIC BLOOD PRESSURE: 158 MMHG | HEART RATE: 91 BPM | WEIGHT: 265.2 LBS

## 2020-07-23 DIAGNOSIS — M79.672 LEFT FOOT PAIN: ICD-10-CM

## 2020-07-23 DIAGNOSIS — I10 ESSENTIAL HYPERTENSION, BENIGN: ICD-10-CM

## 2020-07-23 DIAGNOSIS — Z00.00 MEDICARE ANNUAL WELLNESS VISIT, SUBSEQUENT: Primary | ICD-10-CM

## 2020-07-23 DIAGNOSIS — I73.9 PERIPHERAL VASCULAR DISEASE (HCC): ICD-10-CM

## 2020-07-23 DIAGNOSIS — E78.2 MIXED HYPERLIPIDEMIA: ICD-10-CM

## 2020-07-23 LAB
BILIRUB UR QL STRIP: NEGATIVE
GLUCOSE UR-MCNC: NEGATIVE MG/DL
KETONES P FAST UR STRIP-MCNC: NEGATIVE MG/DL
PH UR STRIP: 7 [PH] (ref 4.6–8)
PROT UR QL STRIP: NEGATIVE
SP GR UR STRIP: 1.01 (ref 1–1.03)
UA UROBILINOGEN AMB POC: NORMAL (ref 0.2–1)
URINALYSIS CLARITY POC: CLEAR
URINALYSIS COLOR POC: YELLOW
URINE BLOOD POC: NEGATIVE
URINE LEUKOCYTES POC: NORMAL
URINE NITRITES POC: NEGATIVE

## 2020-07-23 PROCEDURE — 80053 COMPREHEN METABOLIC PANEL: CPT

## 2020-07-23 PROCEDURE — 80061 LIPID PANEL: CPT

## 2020-07-23 PROCEDURE — 85025 COMPLETE CBC W/AUTO DIFF WBC: CPT

## 2020-07-23 RX ORDER — AMLODIPINE BESYLATE 10 MG/1
10 TABLET ORAL DAILY
Qty: 30 TAB | Refills: 5 | Status: SHIPPED | OUTPATIENT
Start: 2020-07-23 | End: 2020-10-06 | Stop reason: ALTCHOICE

## 2020-07-23 NOTE — PROGRESS NOTES
Chief Complaint   Patient presents with    Leg Pain     left foot is cool to touch and left leg pain at times     1. Have you been to the ER, urgent care clinic since your last visit? Hospitalized since your last visit?no    2. Have you seen or consulted any other health care providers outside of the [address] [place] [address] since your last visit? Include any pap smears or colon screening.  Hong Redmond

## 2020-07-23 NOTE — PROGRESS NOTES
This is the Subsequent Medicare Annual Wellness Exam, performed 12 months or more after the Initial AWV or the last Subsequent AWV    I have reviewed the patient's medical history in detail and updated the computerized patient record. History     Patient Active Problem List   Diagnosis Code    Essential hypertension, benign I10    Glaucoma H40.9    Asthma J45.909    Allergic rhinitis J30.9    OA (osteoarthritis) M19.90    Encounter for long-term (current) use of other medications Z79.899    Mixed hyperlipidemia E78.2    Nocturia R35.1    Cervical radiculitis M54.12    GERD (gastroesophageal reflux disease) K21.9    Chronic maxillary sinusitis J32.0    Severe obesity ([address] E66.01    Peripheral vascular disease ([address] I73.9     Past Medical History:   Diagnosis Date    Allergic rhinitis, cause unspecified 11/4/2010    Asthma 11/4/2010    Cervical radiculitis 11/9/2012    Encounter for long-term (current) use of other medications 11/26/2010    Essential hypertension, benign 11/4/2010    Glaucoma 11/4/2010    Hypertension     Mixed hyperlipidemia 5/27/2011    Nocturia 11/9/2012    OA (osteoarthritis) 11/26/2010      Past Surgical History:   Procedure Laterality Date    HX COLONOSCOPY      OR NASAL SCOPY,OPEN MAXILL SINUS       Current Outpatient Medications   Medication Sig Dispense Refill    amLODIPine (NORVASC) 10 mg tablet Take 1 Tab by mouth daily. 30 Tab 5    albuterol (ProAir HFA) 90 mcg/actuation inhaler Take 2 Puffs by inhalation every six (6) hours as needed for Wheezing. 1 Inhaler 11    ramipril (ALTACE) 10 mg capsule TAKE 2 CAPSULES BY MOUTH EVERY  Cap 3    atorvastatin (LIPITOR) 10 mg tablet Take 1 Tab by mouth daily. 90 Tab 3    hydroCHLOROthiazide (HYDRODIURIL) 12.5 mg tablet TAKE 1 TABLET BY MOUTH EVERY DAY 90 Tab 3    montelukast (SINGULAIR) 10 mg tablet Take 10 mg by mouth daily.       meclizine (ANTIVERT) 12.5 mg tablet Take 1 Tab by mouth three (3) times daily as needed. 30 Tab 1    triamcinolone acetonide (KENALOG) 0.1 % topical cream Apply  to affected area three (3) times daily as needed for Skin Irritation. 85 g 2    fexofenadine (ALLEGRA) 180 mg tablet Take  by mouth.  multivitamin (ONE A DAY) tablet Take 1 Tab by mouth daily.  sodium chloride irrigation 0.9 % irrigation IRRIGATE EACH NASAL CAVITY WITH 60CCS OF NORMAL SALINE SOLUTION DAILY. DISP: 1 LITER  10    SALINE NASAL 0.65 % nasal spray INHALE 2 SPRAYS IN EACH NOSTRIL 4 TIMES A DAY FOR 14 DAYS  2    LUMIGAN 0.01 % ophthalmic drops Administer 1 Drop to both eyes nightly. 11    loteprednol etabonate (ALREX) 0.2 % drps Administer 1 Drop to both eyes four (4) times daily as needed.  aspirin 81 mg tablet Take 81 mg by mouth.  famotidine (PEPCID) 40 mg tablet Take 1 Tab by mouth daily. 30 Tab 5    ibuprofen (MOTRIN) 800 mg tablet TAKE 1 TABLET BY MOUTH EVERY 8 HOURS AS NEEDED FOR PAIN 50 Tab 6    meloxicam (MOBIC) 15 mg tablet TAKE 1 TABLET BY MOUTH EVERY DAY 20 Tab 1    brimonidine-timolol (COMBIGAN) 0.2-0.5 % drop ophthalmic solution Administer 1 Drop to both eyes three (3) times daily.  diclofenac (VOLTAREN) 1 % gel Apply 4 g to affected area four (4) times daily.  lidocaine (LIDODERM) 5 % Apply patch to the affected area for 12 hours a day and remove for 12 hours a day. 15 Each 0    methocarbamol (ROBAXIN-750) 750 mg tablet Take 1 Tab by mouth four (4) times daily. 10 Tab 0    EPINEPHrine (EPIPEN) 0.3 mg/0.3 mL injection INJECT INTRAMUSCULARLY ONCE. MAY REPEAT IF NECESSARY  0     Allergies   Allergen Reactions    Latex, Natural Rubber Itching    Diclofenac Other (comments)     Ringing in ears    Propoxyphene-Acetaminophen Rash    Sulfa (Sulfonamide Antibiotics) Shortness of Breath     Pt states he is not allergic.     Sulindac Diarrhea    Zithromax [Azithromycin] Rash       Family History   Problem Relation Age of Onset    Heart Disease Mother     Hypertension Mother     Heart Disease Father     No Known Problems Brother     Cancer Brother      Social History     Tobacco Use    Smoking status: Never Smoker    Smokeless tobacco: Never Used   Substance Use Topics    Alcohol use: No       Depression Risk Factor Screening:     3 most recent PHQ Screens 7/23/2020   Little interest or pleasure in doing things Several days   Feeling down, depressed, irritable, or hopeless Several days   Total Score PHQ 2 2       Alcohol Risk Factor Screening (MALE > 65): Do you average more 1 drink per night or more than 7 drinks a week: No    In the past three months have you have had more than 4 drinks containing alcohol on one occasion: No      Functional Ability and Level of Safety:   Hearing: Hearing is good. Activities of Daily Living: The home contains: handrails and poor lighting  Patient does total self care     Ambulation: with no difficulty     Fall Risk:  Fall Risk Assessment, last 12 mths 7/23/2020   Able to walk? Yes   Fall in past 12 months?  No     Abuse Screen:  Patient is not abused       Cognitive Screening   Has your family/caregiver stated any concerns about your memory: no     Cognitive Screening: Normal - 0    Patient Care Team   Patient Care Team:  Calvin Salvador MD as PCP - [address] [place], [place] [address]Nelson MD as PCP - [facility] Empaneled Provider  Ary Hoang MD as Physician (Ophthalmology)    Assessment/Plan

## 2020-07-23 NOTE — PROGRESS NOTES
HISTORY OF PRESENT ILLNESS  Monet Claire is a 81 y.o. malef/u hbp,chol. Feeling well,s/p coiling of aneurysm at [facility]. Had some l foot coolness and pain,now resolved  Hypertension    This is a chronic problem. The problem has not changed since onset. Associated symptoms include dizziness. Pertinent negatives include no chest pain, no orthopnea, no tinnitus, no neck pain, no peripheral edema and no shortness of breath. Cholesterol Problem   This is a chronic problem. The problem occurs daily. The problem has not changed since onset. Pertinent negatives include no chest pain and no shortness of breath. Leg Pain    The history is provided by the patient. This is a new problem. The current episode started more than 1 week ago. The problem occurs daily. The problem has been rapidly improving. The pain is present in the left foot. The pain is at a severity of 2/10. The pain is mild. Pertinent negatives include no back pain and no neck pain. Review of Systems   Constitutional: Negative for chills and fever. HENT: Negative for tinnitus. Respiratory: Negative for shortness of breath. Cardiovascular: Negative for chest pain and orthopnea. Genitourinary: Negative for dysuria and frequency. Musculoskeletal: Negative for back pain, myalgias and neck pain. Neurological: Positive for dizziness. Psychiatric/Behavioral: Negative for depression. Physical Exam  Constitutional:       Appearance: He is well-developed. HENT:      Head: Normocephalic and atraumatic. Right Ear: External ear normal.      Left Ear: External ear normal.      Nose: Nose normal.   Cardiovascular:      Rate and Rhythm: Normal rate and regular rhythm. Pulses: Normal pulses. Heart sounds: Normal heart sounds. Pulmonary:      Effort: Pulmonary effort is normal.      Breath sounds: Normal breath sounds. Abdominal:      General: Bowel sounds are normal.      Palpations: Abdomen is soft.    Skin:     General: Skin is warm and dry. Neurological:      Mental Status: He is alert and oriented to person, place, and time. Diagnoses and all orders for this visit:    1. Essential hypertension, benign,controlled  -     METABOLIC PANEL, COMPREHENSIVE    2. Mixed hyperlipidemia  -     CHOLESTEROL, TOTAL    3. Mild intermittent asthma without complication    4. Non-seasonal allergic rhinitis, unspecified trigger    5.  Primary osteoarthritis of left knee,stable

## 2020-07-24 LAB
ALBUMIN SERPL-MCNC: 4.5 G/DL (ref 3.8–4.8)
ALBUMIN/GLOB SERPL: 1.6 {RATIO} (ref 1.2–2.2)
ALP SERPL-CCNC: 97 IU/L (ref 39–117)
ALT SERPL-CCNC: 28 IU/L (ref 0–44)
AST SERPL-CCNC: 23 IU/L (ref 0–40)
BASOPHILS # BLD AUTO: 0.1 X10E3/UL (ref 0–0.2)
BASOPHILS NFR BLD AUTO: 1 %
BILIRUB SERPL-MCNC: 0.6 MG/DL (ref 0–1.2)
BUN SERPL-MCNC: 10 MG/DL (ref 8–27)
BUN/CREAT SERPL: 8 (ref 10–24)
CALCIUM SERPL-MCNC: 9.5 MG/DL (ref 8.6–10.2)
CHLORIDE SERPL-SCNC: 98 MMOL/L (ref 96–106)
CHOLEST SERPL-MCNC: 143 MG/DL (ref 100–199)
CO2 SERPL-SCNC: 26 MMOL/L (ref 20–29)
CREAT SERPL-MCNC: 1.23 MG/DL (ref 0.76–1.27)
EOSINOPHIL # BLD AUTO: 0.3 X10E3/UL (ref 0–0.4)
EOSINOPHIL NFR BLD AUTO: 3 %
ERYTHROCYTE [DISTWIDTH] IN BLOOD BY AUTOMATED COUNT: 12.9 % (ref 11.6–15.4)
GLOBULIN SER CALC-MCNC: 2.8 G/DL (ref 1.5–4.5)
GLUCOSE SERPL-MCNC: 102 MG/DL (ref 65–99)
HCT VFR BLD AUTO: 37.1 % (ref 37.5–51)
HDLC SERPL-MCNC: 34 MG/DL
HGB BLD-MCNC: 12.7 G/DL (ref 13–17.7)
IMM GRANULOCYTES # BLD AUTO: 0 X10E3/UL (ref 0–0.1)
IMM GRANULOCYTES NFR BLD AUTO: 0 %
INTERPRETATION, 910389: NORMAL
LDLC SERPL CALC-MCNC: 65 MG/DL (ref 0–99)
LYMPHOCYTES # BLD AUTO: 3 X10E3/UL (ref 0.7–3.1)
LYMPHOCYTES NFR BLD AUTO: 34 %
MCH RBC QN AUTO: 29.5 PG (ref 26.6–33)
MCHC RBC AUTO-ENTMCNC: 34.2 G/DL (ref 31.5–35.7)
MCV RBC AUTO: 86 FL (ref 79–97)
MONOCYTES # BLD AUTO: 0.8 X10E3/UL (ref 0.1–0.9)
MONOCYTES NFR BLD AUTO: 9 %
NEUTROPHILS # BLD AUTO: 4.6 X10E3/UL (ref 1.4–7)
NEUTROPHILS NFR BLD AUTO: 53 %
PLATELET # BLD AUTO: 433 X10E3/UL (ref 150–450)
POTASSIUM SERPL-SCNC: 3.8 MMOL/L (ref 3.5–5.2)
PROT SERPL-MCNC: 7.3 G/DL (ref 6–8.5)
RBC # BLD AUTO: 4.3 X10E6/UL (ref 4.14–5.8)
SODIUM SERPL-SCNC: 140 MMOL/L (ref 134–144)
TRIGL SERPL-MCNC: 221 MG/DL (ref 0–149)
VLDLC SERPL CALC-MCNC: 44 MG/DL (ref 5–40)
WBC # BLD AUTO: 8.7 X10E3/UL (ref 3.4–10.8)

## 2020-08-31 ENCOUNTER — OFFICE VISIT (OUTPATIENT)
Dept: FAMILY MEDICINE CLINIC | Age: 70
End: 2020-08-31
Payer: MEDICARE

## 2020-08-31 VITALS
RESPIRATION RATE: 20 BRPM | BODY MASS INDEX: 36.44 KG/M2 | HEIGHT: 72 IN | WEIGHT: 269 LBS | TEMPERATURE: 97.5 F | SYSTOLIC BLOOD PRESSURE: 134 MMHG | HEART RATE: 92 BPM | OXYGEN SATURATION: 97 % | DIASTOLIC BLOOD PRESSURE: 80 MMHG

## 2020-08-31 DIAGNOSIS — I67.1 CEREBRAL ANEURYSM: ICD-10-CM

## 2020-08-31 DIAGNOSIS — K21.00 GASTROESOPHAGEAL REFLUX DISEASE WITH ESOPHAGITIS: ICD-10-CM

## 2020-08-31 DIAGNOSIS — I10 ESSENTIAL HYPERTENSION, BENIGN: Primary | ICD-10-CM

## 2020-08-31 DIAGNOSIS — Z12.11 SCREEN FOR COLON CANCER: ICD-10-CM

## 2020-08-31 DIAGNOSIS — E78.2 MIXED HYPERLIPIDEMIA: ICD-10-CM

## 2020-08-31 PROCEDURE — G8536 NO DOC ELDER MAL SCRN: HCPCS | Performed by: FAMILY MEDICINE

## 2020-08-31 PROCEDURE — G8752 SYS BP LESS 140: HCPCS | Performed by: FAMILY MEDICINE

## 2020-08-31 PROCEDURE — G8417 CALC BMI ABV UP PARAM F/U: HCPCS | Performed by: FAMILY MEDICINE

## 2020-08-31 PROCEDURE — G0463 HOSPITAL OUTPT CLINIC VISIT: HCPCS | Performed by: FAMILY MEDICINE

## 2020-08-31 PROCEDURE — G8427 DOCREV CUR MEDS BY ELIG CLIN: HCPCS | Performed by: FAMILY MEDICINE

## 2020-08-31 PROCEDURE — 99212 OFFICE O/P EST SF 10 MIN: CPT | Performed by: FAMILY MEDICINE

## 2020-08-31 PROCEDURE — G8754 DIAS BP LESS 90: HCPCS | Performed by: FAMILY MEDICINE

## 2020-08-31 PROCEDURE — 3017F COLORECTAL CA SCREEN DOC REV: CPT | Performed by: FAMILY MEDICINE

## 2020-08-31 PROCEDURE — G8510 SCR DEP NEG, NO PLAN REQD: HCPCS | Performed by: FAMILY MEDICINE

## 2020-08-31 PROCEDURE — 1101F PT FALLS ASSESS-DOCD LE1/YR: CPT | Performed by: FAMILY MEDICINE

## 2020-08-31 RX ORDER — FAMOTIDINE 40 MG/1
40 TABLET, FILM COATED ORAL DAILY
Qty: 30 TAB | Refills: 5 | Status: SHIPPED | OUTPATIENT
Start: 2020-08-31 | End: 2020-10-20

## 2020-08-31 NOTE — PROGRESS NOTES
HISTORY OF PRESENT ILLNESS  Ishmael Carlton is a 81 y.o. male. f/u coiling aneurysm,hbp,cholar. Doing well. Wishes to have FIT test as he is on NOAC for aneurysm. Colonoscopy note from 2014 reviewed,no polyps  Hypertension    The history is provided by the patient. This is a chronic problem. The problem has been resolved. Pertinent negatives include no malaise/fatigue, no peripheral edema and no dizziness. Cholesterol Problem   The history is provided by the patient. This is a chronic problem. The problem occurs daily. The problem has not changed since onset. Review of Systems   Constitutional: Negative for malaise/fatigue. HENT: Negative for hearing loss. Respiratory: Negative for cough, hemoptysis and sputum production. Gastrointestinal: Negative for blood in stool, constipation and melena. Genitourinary: Negative for frequency. Musculoskeletal: Negative for back pain. Neurological: Negative for dizziness. Physical Exam  Constitutional:       Appearance: Normal appearance. HENT:      Head: Normocephalic. Right Ear: Tympanic membrane normal.      Left Ear: Tympanic membrane normal.      Nose: Nose normal.   Cardiovascular:      Rate and Rhythm: Normal rate and regular rhythm. Pulses: Normal pulses. Heart sounds: Normal heart sounds. Pulmonary:      Effort: Pulmonary effort is normal.      Breath sounds: Normal breath sounds. Neurological:      Mental Status: He is alert. ASSESSMENT and PLAN  Diagnoses and all orders for this visit:    1. Essential hypertension, benign,controlled    2. Mixed hyperlipidemia    3. Cerebral aneurysm    4. Gastroesophageal reflux disease with esophagitis  -     famotidine (PEPCID) 40 mg tablet; Take 1 Tab by mouth daily. 5. Screen for colon cancer,low risk patient  -     OCCULT BLOOD IMMUNOASSAY,DIAGNOSTIC      Follow-up and Dispositions    · Return in about 3 months (around 11/30/2020).

## 2020-08-31 NOTE — PROGRESS NOTES
Chief Complaint   Patient presents with    Hypertension     follow up    Cholesterol Problem     follow up     1. Have you been to the ER, urgent care clinic since your last visit? Hospitalized since your last visit?no    2. Have you seen or consulted any other health care providers outside of the [address] [place] [address] since your last visit? Include any pap smears or colon screening.  no

## 2020-09-04 RX ORDER — FAMOTIDINE 20 MG/1
40 TABLET, FILM COATED ORAL 2 TIMES DAILY
Qty: 120 TAB | Refills: 0 | Status: SHIPPED | OUTPATIENT
Start: 2020-09-04 | End: 2020-10-04

## 2020-09-04 NOTE — TELEPHONE ENCOUNTER
[facility] sent fax stating that Pepcid 40mg is on backorder. Can a script for 2 20mg be sent in? Kat Jeffers    Last visit:8/31/20  Next visit: 11/30/20  Previous refill 8/31/20(40mg is on nationwide backorder)    Requested Prescriptions     Pending Prescriptions Disp Refills    famotidine (PEPCID) 20 mg tablet 120 Tab 0     Sig: Take 2 Tabs by mouth two (2) times a day.

## 2020-09-23 ENCOUNTER — HOSPITAL ENCOUNTER (OUTPATIENT)
Dept: LAB | Age: 70
Discharge: HOME | End: 2020-09-23
Payer: MEDICARE

## 2020-09-23 PROCEDURE — 82270 OCCULT BLOOD FECES: CPT

## 2020-10-01 DIAGNOSIS — I10 ESSENTIAL HYPERTENSION, BENIGN: ICD-10-CM

## 2020-10-01 RX ORDER — HYDROCHLOROTHIAZIDE 12.5 MG/1
TABLET ORAL
Qty: 90 TAB | Refills: 3 | Status: SHIPPED | OUTPATIENT
Start: 2020-10-01 | End: 2020-10-06 | Stop reason: ALTCHOICE

## 2020-10-02 ENCOUNTER — TELEPHONE (OUTPATIENT)
Dept: FAMILY MEDICINE CLINIC | Age: 70
End: 2020-10-02

## 2020-10-02 LAB — HEMOCCULT STL QL IA: NEGATIVE

## 2020-10-02 NOTE — TELEPHONE ENCOUNTER
Patient wants a return call regarding having a swollen left ankle.   Please give him a call   @ Cell [phone]

## 2020-10-02 NOTE — TELEPHONE ENCOUNTER
Patient states his ankles were swollen when he got up this morning, no coughing , no SOB. He will elevate his ankles while sitting, and he put on compression stockings. Patient will call on Monday if ankles are no better.

## 2020-10-04 RX ORDER — FAMOTIDINE 20 MG/1
TABLET, FILM COATED ORAL
Qty: 120 TAB | Refills: 0 | Status: SHIPPED | OUTPATIENT
Start: 2020-10-04 | End: 2020-10-06

## 2020-10-05 ENCOUNTER — TELEPHONE (OUTPATIENT)
Dept: FAMILY MEDICINE CLINIC | Age: 70
End: 2020-10-05

## 2020-10-05 NOTE — TELEPHONE ENCOUNTER
----- Message from Thong Yates sent at 10/5/2020  9:52 AM EDT -----  Regarding: Dr. Zoya Cobb first and last name: Self  Reason for call: Nurse advised to call Monday  Callback required yes/no and why: Yes to schedule  Best contact number(s): [phone]  Preferred date and time: Any this week  Scheduled appointment date and time: N/A  Reason for appointment: Foot pain  Details to clarify the request: Pt was advised by Dr. Evette Ireland nurse to call in on today if still having issue with left foot. Pt would like to come in to be seen by Dr. Judy Sandifer this week.

## 2020-10-06 ENCOUNTER — OFFICE VISIT (OUTPATIENT)
Dept: FAMILY MEDICINE CLINIC | Age: 70
End: 2020-10-06
Payer: MEDICARE

## 2020-10-06 VITALS
TEMPERATURE: 97.8 F | HEART RATE: 90 BPM | SYSTOLIC BLOOD PRESSURE: 142 MMHG | DIASTOLIC BLOOD PRESSURE: 82 MMHG | WEIGHT: 269 LBS | HEIGHT: 72 IN | OXYGEN SATURATION: 98 % | BODY MASS INDEX: 36.44 KG/M2 | RESPIRATION RATE: 20 BRPM

## 2020-10-06 DIAGNOSIS — K21.00 GASTROESOPHAGEAL REFLUX DISEASE WITH ESOPHAGITIS WITHOUT HEMORRHAGE: ICD-10-CM

## 2020-10-06 DIAGNOSIS — I10 ESSENTIAL HYPERTENSION, BENIGN: ICD-10-CM

## 2020-10-06 DIAGNOSIS — R60.9 PERIPHERAL EDEMA: Primary | ICD-10-CM

## 2020-10-06 PROCEDURE — 1101F PT FALLS ASSESS-DOCD LE1/YR: CPT | Performed by: FAMILY MEDICINE

## 2020-10-06 PROCEDURE — 3017F COLORECTAL CA SCREEN DOC REV: CPT | Performed by: FAMILY MEDICINE

## 2020-10-06 PROCEDURE — G8754 DIAS BP LESS 90: HCPCS | Performed by: FAMILY MEDICINE

## 2020-10-06 PROCEDURE — G8427 DOCREV CUR MEDS BY ELIG CLIN: HCPCS | Performed by: FAMILY MEDICINE

## 2020-10-06 PROCEDURE — G8510 SCR DEP NEG, NO PLAN REQD: HCPCS | Performed by: FAMILY MEDICINE

## 2020-10-06 PROCEDURE — G8753 SYS BP > OR = 140: HCPCS | Performed by: FAMILY MEDICINE

## 2020-10-06 PROCEDURE — G8536 NO DOC ELDER MAL SCRN: HCPCS | Performed by: FAMILY MEDICINE

## 2020-10-06 PROCEDURE — 99213 OFFICE O/P EST LOW 20 MIN: CPT | Performed by: FAMILY MEDICINE

## 2020-10-06 PROCEDURE — G0463 HOSPITAL OUTPT CLINIC VISIT: HCPCS | Performed by: FAMILY MEDICINE

## 2020-10-06 PROCEDURE — G8417 CALC BMI ABV UP PARAM F/U: HCPCS | Performed by: FAMILY MEDICINE

## 2020-10-06 RX ORDER — DILTIAZEM HYDROCHLORIDE 240 MG/1
240 CAPSULE, COATED, EXTENDED RELEASE ORAL DAILY
Qty: 30 CAP | Refills: 5 | Status: SHIPPED | OUTPATIENT
Start: 2020-10-06 | End: 2020-10-13 | Stop reason: DRUGHIGH

## 2020-10-06 RX ORDER — PANTOPRAZOLE SODIUM 20 MG/1
20 TABLET, DELAYED RELEASE ORAL DAILY
Qty: 30 TAB | Refills: 6 | Status: SHIPPED | OUTPATIENT
Start: 2020-10-06 | End: 2021-03-22 | Stop reason: ALTCHOICE

## 2020-10-06 RX ORDER — HYDROCHLOROTHIAZIDE 25 MG/1
25 TABLET ORAL DAILY
Qty: 30 TAB | Refills: 5 | Status: SHIPPED | OUTPATIENT
Start: 2020-10-06 | End: 2021-01-31

## 2020-10-06 NOTE — PROGRESS NOTES
Chief Complaint   Patient presents with    Ankle swelling     left swollen ankle     1. Have you been to the ER, urgent care clinic since your last visit? Hospitalized since your last visit?no    2. Have you seen or consulted any other health care providers outside of the [address] [place] [address] since your last visit? Include any pap smears or colon screening.  no 36.9

## 2020-10-08 ENCOUNTER — TELEPHONE (OUTPATIENT)
Dept: FAMILY MEDICINE CLINIC | Age: 70
End: 2020-10-08

## 2020-10-08 RX ORDER — ASPIRIN 325 MG
325 TABLET ORAL 2 TIMES DAILY
COMMUNITY

## 2020-10-08 RX ORDER — PRASUGREL 10 MG/1
10 TABLET, FILM COATED ORAL 2 TIMES DAILY
COMMUNITY
End: 2020-12-21 | Stop reason: ALTCHOICE

## 2020-10-08 NOTE — TELEPHONE ENCOUNTER
Per pharmacist Orlin Mariano XT interferes with Amlodipine, advised pharmacist that Amlodipine is not on patient current med list,

## 2020-10-08 NOTE — TELEPHONE ENCOUNTER
----- Message from Alexander Caceres sent at 10/8/2020 10:46 AM EDT -----  Regarding: Dr. Romayne Ginger / Telephone  Caller's first and last name: self  Reason for call: Pt has harrison questions about his medications. He was recently prescribed 2 new medications, prasugrel 10mg x2 daily and aspirin 325mg x2 daily and wants to confirm these medications won't interact with his current medications, pantoprazole & diltiazem er.   Callback required yes/no and why: Yes  Best contact number(s): [phone]  Details to clarify the request: prasugrel 10mg 2 x daily, aspirin 325mg x2 daily, pantoparozole, diltlazem er

## 2020-10-08 NOTE — TELEPHONE ENCOUNTER
----- Message from Macy Wong sent at 10/8/2020 10:46 AM EDT -----  Regarding: Dr. Yudi Greene / Reinier Grigsby (if not patient): self  Relationship of caller (if not patient): self  Best contact number(s): [phone]  Name of medication and dosage if known: diltlazem er  Is patient out of this medication (yes/no):  Pharmacy name: [facility] listed in chart? (yes/no): Yes  Pharmacy phone number: [phone]  Date of last visit: 10/06/20  Details to clarify the request: Pt advised he called pharmacy but above medication needs a prior authorization.

## 2020-10-13 DIAGNOSIS — I10 ESSENTIAL HYPERTENSION, BENIGN: Primary | ICD-10-CM

## 2020-10-13 RX ORDER — DILTIAZEM HYDROCHLORIDE 300 MG/1
300 CAPSULE, COATED, EXTENDED RELEASE ORAL DAILY
Qty: 30 CAP | Refills: 11 | Status: SHIPPED | OUTPATIENT
Start: 2020-10-13 | End: 2021-10-22 | Stop reason: ALTCHOICE

## 2020-10-20 ENCOUNTER — OFFICE VISIT (OUTPATIENT)
Dept: FAMILY MEDICINE CLINIC | Age: 70
End: 2020-10-20
Payer: MEDICARE

## 2020-10-20 VITALS
DIASTOLIC BLOOD PRESSURE: 82 MMHG | HEIGHT: 72 IN | SYSTOLIC BLOOD PRESSURE: 142 MMHG | OXYGEN SATURATION: 98 % | HEART RATE: 76 BPM | BODY MASS INDEX: 36.87 KG/M2 | WEIGHT: 272.2 LBS | TEMPERATURE: 98 F | RESPIRATION RATE: 20 BRPM

## 2020-10-20 DIAGNOSIS — R60.9 PERIPHERAL EDEMA: ICD-10-CM

## 2020-10-20 DIAGNOSIS — I67.1 CEREBRAL ANEURYSM: ICD-10-CM

## 2020-10-20 DIAGNOSIS — I10 ESSENTIAL HYPERTENSION, BENIGN: Primary | ICD-10-CM

## 2020-10-20 PROCEDURE — G8536 NO DOC ELDER MAL SCRN: HCPCS | Performed by: FAMILY MEDICINE

## 2020-10-20 PROCEDURE — G8753 SYS BP > OR = 140: HCPCS | Performed by: FAMILY MEDICINE

## 2020-10-20 PROCEDURE — G8427 DOCREV CUR MEDS BY ELIG CLIN: HCPCS | Performed by: FAMILY MEDICINE

## 2020-10-20 PROCEDURE — 1101F PT FALLS ASSESS-DOCD LE1/YR: CPT | Performed by: FAMILY MEDICINE

## 2020-10-20 PROCEDURE — G0463 HOSPITAL OUTPT CLINIC VISIT: HCPCS | Performed by: FAMILY MEDICINE

## 2020-10-20 PROCEDURE — G8417 CALC BMI ABV UP PARAM F/U: HCPCS | Performed by: FAMILY MEDICINE

## 2020-10-20 PROCEDURE — 99212 OFFICE O/P EST SF 10 MIN: CPT | Performed by: FAMILY MEDICINE

## 2020-10-20 PROCEDURE — G8754 DIAS BP LESS 90: HCPCS | Performed by: FAMILY MEDICINE

## 2020-10-20 PROCEDURE — G8510 SCR DEP NEG, NO PLAN REQD: HCPCS | Performed by: FAMILY MEDICINE

## 2020-10-20 PROCEDURE — 3017F COLORECTAL CA SCREEN DOC REV: CPT | Performed by: FAMILY MEDICINE

## 2020-10-20 NOTE — PROGRESS NOTES
HISTORY OF PRESENT ILLNESS  Karma Santo is a 81 y.o. male. f/u hbp,leg edema,cerebral aneurysm s/p coiling. Feeling well,edema slowly improving,tolerating Diltiazem well  Ankle swelling    The history is provided by the patient. This is a chronic problem. The problem occurs daily. The problem has been gradually improving. The pain is present in the right ankle and left ankle. The patient is experiencing no pain. Hypertension    The history is provided by the patient. This is a chronic problem. The problem has been gradually improving. Associated symptoms include peripheral edema. Pertinent negatives include no chest pain, no palpitations, no anxiety, no malaise/fatigue, no headaches, no dizziness and no shortness of breath. Review of Systems   Constitutional: Negative for fever and malaise/fatigue. Respiratory: Negative for shortness of breath. Cardiovascular: Positive for leg swelling. Negative for chest pain and palpitations. Genitourinary: Negative for frequency. Neurological: Negative for dizziness and headaches. Physical Exam  Constitutional:       Appearance: Normal appearance. He is obese. HENT:      Head: Normocephalic and atraumatic. Nose: Nose normal.      Mouth/Throat:      Mouth: Mucous membranes are moist.   Neck:      Musculoskeletal: Normal range of motion and neck supple. Cardiovascular:      Rate and Rhythm: Normal rate and regular rhythm. Pulses: Normal pulses. Heart sounds: Normal heart sounds. Pulmonary:      Effort: Pulmonary effort is normal.      Breath sounds: Normal breath sounds. Musculoskeletal:      Right lower leg: Edema present. Left lower leg: Edema present. Comments: 1-2 + ppe,bilaterally   Neurological:      Mental Status: He is alert. Diagnoses and all orders for this visit:    1. Essential hypertension, benign,controlled    2. Cerebral aneurysm    3.  Peripheral edema,improving    Continue current meds and treatments. Follow-up and Dispositions    · Return in about 2 months (around 12/20/2020). Follow-up and Dispositions    · Return in about 2 months (around 12/20/2020).

## 2020-10-20 NOTE — PROGRESS NOTES
Chief Complaint   Patient presents with    Ankle swelling     f/u ankle swelling     1. Have you been to the ER, urgent care clinic since your last visit? Hospitalized since your last visit?no    2. Have you seen or consulted any other health care providers outside of the [address] [place] [address] since your last visit? Include any pap smears or colon screening.  no

## 2020-10-28 RX ORDER — FAMOTIDINE 20 MG/1
TABLET, FILM COATED ORAL
Qty: 120 TAB | Refills: 0 | Status: SHIPPED | OUTPATIENT
Start: 2020-10-28 | End: 2020-11-23

## 2020-12-18 RX ORDER — RAMIPRIL 10 MG/1
CAPSULE ORAL
Qty: 180 CAP | Refills: 3 | Status: SHIPPED | OUTPATIENT
Start: 2020-12-18 | End: 2022-01-09

## 2020-12-21 ENCOUNTER — OFFICE VISIT (OUTPATIENT)
Dept: FAMILY MEDICINE CLINIC | Age: 70
End: 2020-12-21
Payer: MEDICARE

## 2020-12-21 VITALS
BODY MASS INDEX: 36.9 KG/M2 | RESPIRATION RATE: 18 BRPM | HEIGHT: 72 IN | DIASTOLIC BLOOD PRESSURE: 78 MMHG | TEMPERATURE: 97.5 F | OXYGEN SATURATION: 97 % | SYSTOLIC BLOOD PRESSURE: 140 MMHG | HEART RATE: 71 BPM | WEIGHT: 272.4 LBS

## 2020-12-21 DIAGNOSIS — R60.9 PERIPHERAL EDEMA: ICD-10-CM

## 2020-12-21 DIAGNOSIS — I10 ESSENTIAL HYPERTENSION, BENIGN: Primary | ICD-10-CM

## 2020-12-21 DIAGNOSIS — I67.1 CEREBRAL ANEURYSM: ICD-10-CM

## 2020-12-21 DIAGNOSIS — E78.2 MIXED HYPERLIPIDEMIA: ICD-10-CM

## 2020-12-21 DIAGNOSIS — K21.00 GASTROESOPHAGEAL REFLUX DISEASE WITH ESOPHAGITIS WITHOUT HEMORRHAGE: ICD-10-CM

## 2020-12-21 PROCEDURE — 1101F PT FALLS ASSESS-DOCD LE1/YR: CPT | Performed by: FAMILY MEDICINE

## 2020-12-21 PROCEDURE — G0463 HOSPITAL OUTPT CLINIC VISIT: HCPCS | Performed by: FAMILY MEDICINE

## 2020-12-21 PROCEDURE — G8417 CALC BMI ABV UP PARAM F/U: HCPCS | Performed by: FAMILY MEDICINE

## 2020-12-21 PROCEDURE — 99213 OFFICE O/P EST LOW 20 MIN: CPT | Performed by: FAMILY MEDICINE

## 2020-12-21 PROCEDURE — G8754 DIAS BP LESS 90: HCPCS | Performed by: FAMILY MEDICINE

## 2020-12-21 PROCEDURE — 3017F COLORECTAL CA SCREEN DOC REV: CPT | Performed by: FAMILY MEDICINE

## 2020-12-21 PROCEDURE — G8536 NO DOC ELDER MAL SCRN: HCPCS | Performed by: FAMILY MEDICINE

## 2020-12-21 PROCEDURE — G8753 SYS BP > OR = 140: HCPCS | Performed by: FAMILY MEDICINE

## 2020-12-21 PROCEDURE — G8510 SCR DEP NEG, NO PLAN REQD: HCPCS | Performed by: FAMILY MEDICINE

## 2020-12-21 PROCEDURE — G8427 DOCREV CUR MEDS BY ELIG CLIN: HCPCS | Performed by: FAMILY MEDICINE

## 2020-12-21 NOTE — PROGRESS NOTES
Chief Complaint   Patient presents with    Hypertension     F/U on BP.  Cholesterol Problem     F/U on cholesterol. 1. Have you been to the ER, urgent care clinic since your last visit? Hospitalized since your last visit? No    2. Have you seen or consulted any other health care providers outside of the [address] [place] [address] since your last visit? Include any pap smears or colon screening.  No

## 2020-12-21 NOTE — PROGRESS NOTES
HISTORY OF PRESENT ILLNESS  Mary Carmen Alfonso is a 78 y.o. male. s/p coiling aneurysm,f/u hbp,chol,ar. Doing well. Has f/u mri,neuro appt next mo  Hypertension   The history is provided by the patient. This is a chronic problem. The problem has not changed since onset. Pertinent negatives include no chest pain, no malaise/fatigue, no headaches, no peripheral edema, no dizziness and no shortness of breath. Cholesterol Problem  The history is provided by the patient. This is a chronic problem. The problem occurs daily. The problem has not changed since onset. Pertinent negatives include no chest pain, no headaches and no shortness of breath. Leg Swelling  The history is provided by the patient. This is a chronic problem. The problem occurs daily. The problem has been gradually improving. Pertinent negatives include no chest pain, no headaches and no shortness of breath. Review of Systems   Constitutional: Negative for malaise/fatigue. HENT: Negative for hearing loss. Respiratory: Negative for cough, hemoptysis, sputum production and shortness of breath. Cardiovascular: Positive for leg swelling. Negative for chest pain. Gastrointestinal: Negative for blood in stool, constipation and melena. Genitourinary: Negative for frequency. Musculoskeletal: Negative for back pain and myalgias. Skin: Negative for rash. Neurological: Negative for dizziness and headaches. Physical Exam  Constitutional:       Appearance: Normal appearance. He is obese. HENT:      Head: Normocephalic. Right Ear: Tympanic membrane normal.      Left Ear: Tympanic membrane normal.      Nose: Nose normal.   Neck:      Musculoskeletal: Neck supple. Cardiovascular:      Rate and Rhythm: Normal rate and regular rhythm. Pulses: Normal pulses. Heart sounds: Normal heart sounds. Pulmonary:      Effort: Pulmonary effort is normal.      Breath sounds: Normal breath sounds.    Neurological:      General: No focal deficit present. Mental Status: He is alert and oriented to person, place, and time. Mental status is at baseline. Psychiatric:         Mood and Affect: Mood normal.       Diagnoses and all orders for this visit:    1. Essential hypertension, benign,controlled    2. Cerebral aneurysm    3. Peripheral edema    4. Mixed hyperlipidemia    5. Gastroesophageal reflux disease with esophagitis without hemorrhage      Follow-up and Dispositions    · Return in about 3 months (around 3/21/2021).

## 2021-01-30 DIAGNOSIS — I10 ESSENTIAL HYPERTENSION, BENIGN: ICD-10-CM

## 2021-01-31 RX ORDER — HYDROCHLOROTHIAZIDE 25 MG/1
TABLET ORAL
Qty: 90 TAB | Refills: 1 | Status: SHIPPED | OUTPATIENT
Start: 2021-01-31 | End: 2021-06-16

## 2021-03-22 ENCOUNTER — OFFICE VISIT (OUTPATIENT)
Dept: FAMILY MEDICINE CLINIC | Age: 71
End: 2021-03-22
Payer: MEDICARE

## 2021-03-22 VITALS
BODY MASS INDEX: 37.17 KG/M2 | HEIGHT: 72 IN | WEIGHT: 274.4 LBS | SYSTOLIC BLOOD PRESSURE: 140 MMHG | HEART RATE: 78 BPM | RESPIRATION RATE: 20 BRPM | DIASTOLIC BLOOD PRESSURE: 94 MMHG | TEMPERATURE: 96.4 F | OXYGEN SATURATION: 97 %

## 2021-03-22 DIAGNOSIS — I67.1 CEREBRAL ANEURYSM: ICD-10-CM

## 2021-03-22 DIAGNOSIS — N52.9 ERECTILE DYSFUNCTION, UNSPECIFIED ERECTILE DYSFUNCTION TYPE: ICD-10-CM

## 2021-03-22 DIAGNOSIS — M17.12 PRIMARY OSTEOARTHRITIS OF LEFT KNEE: ICD-10-CM

## 2021-03-22 DIAGNOSIS — E78.2 MIXED HYPERLIPIDEMIA: ICD-10-CM

## 2021-03-22 DIAGNOSIS — M79.642 PAIN OF LEFT HAND: ICD-10-CM

## 2021-03-22 DIAGNOSIS — K21.00 GASTROESOPHAGEAL REFLUX DISEASE WITH ESOPHAGITIS WITHOUT HEMORRHAGE: ICD-10-CM

## 2021-03-22 DIAGNOSIS — I10 ESSENTIAL HYPERTENSION, BENIGN: Primary | ICD-10-CM

## 2021-03-22 DIAGNOSIS — R35.1 NOCTURIA: ICD-10-CM

## 2021-03-22 PROCEDURE — G8536 NO DOC ELDER MAL SCRN: HCPCS | Performed by: FAMILY MEDICINE

## 2021-03-22 PROCEDURE — 1101F PT FALLS ASSESS-DOCD LE1/YR: CPT | Performed by: FAMILY MEDICINE

## 2021-03-22 PROCEDURE — G8417 CALC BMI ABV UP PARAM F/U: HCPCS | Performed by: FAMILY MEDICINE

## 2021-03-22 PROCEDURE — G8753 SYS BP > OR = 140: HCPCS | Performed by: FAMILY MEDICINE

## 2021-03-22 PROCEDURE — G0463 HOSPITAL OUTPT CLINIC VISIT: HCPCS | Performed by: FAMILY MEDICINE

## 2021-03-22 PROCEDURE — G8510 SCR DEP NEG, NO PLAN REQD: HCPCS | Performed by: FAMILY MEDICINE

## 2021-03-22 PROCEDURE — G8427 DOCREV CUR MEDS BY ELIG CLIN: HCPCS | Performed by: FAMILY MEDICINE

## 2021-03-22 PROCEDURE — G8755 DIAS BP > OR = 90: HCPCS | Performed by: FAMILY MEDICINE

## 2021-03-22 PROCEDURE — 99213 OFFICE O/P EST LOW 20 MIN: CPT | Performed by: FAMILY MEDICINE

## 2021-03-22 PROCEDURE — 3017F COLORECTAL CA SCREEN DOC REV: CPT | Performed by: FAMILY MEDICINE

## 2021-03-22 RX ORDER — IBUPROFEN 400 MG/1
400 TABLET ORAL
Qty: 30 TAB | Refills: 11 | Status: SHIPPED | OUTPATIENT
Start: 2021-03-22 | End: 2021-10-22 | Stop reason: ALTCHOICE

## 2021-03-22 RX ORDER — FAMOTIDINE 40 MG/1
40 TABLET, FILM COATED ORAL DAILY
COMMUNITY
End: 2021-10-11

## 2021-03-22 RX ORDER — SILDENAFIL 50 MG/1
50 TABLET, FILM COATED ORAL AS NEEDED
Qty: 4 TAB | Refills: 5 | Status: SHIPPED | OUTPATIENT
Start: 2021-03-22 | End: 2022-02-18 | Stop reason: ALTCHOICE

## 2021-03-22 NOTE — PROGRESS NOTES
HISTORY OF PRESENT ILLNESS  Mckenzie Fitzgerald is a 78 y.o. male. f/u coiling aneurysm,hbp,chol,ar. Doing well,dizziness and sinus troubles much improved after sinus surgery and aneurysm coiling. Having some pain l ring finger mp joint,no apparent injury  Hypertension   The history is provided by the patient. This is a chronic problem. The problem has been resolved. Pertinent negatives include no chest pain, no orthopnea, no palpitations, no malaise/fatigue, no peripheral edema and no dizziness. Cholesterol Problem  The history is provided by the patient. This is a chronic problem. The problem occurs daily. The problem has not changed since onset. Pertinent negatives include no chest pain. Hand Pain   This is a new problem. The current episode started more than 1 week ago. The problem occurs daily. The pain is present in the left fingers. The pain is at a severity of 4/10. The pain is moderate. Associated symptoms include stiffness. Pertinent negatives include no back pain. Review of Systems   Constitutional: Negative for fever and malaise/fatigue. HENT: Negative for hearing loss. Respiratory: Negative for cough, hemoptysis and sputum production. Cardiovascular: Negative for chest pain, palpitations and orthopnea. Gastrointestinal: Negative for blood in stool, constipation and melena. Genitourinary: Negative for frequency. Musculoskeletal: Positive for stiffness. Negative for back pain. Neurological: Negative for dizziness. Psychiatric/Behavioral: Negative for depression. Physical Exam  Constitutional:       Appearance: Normal appearance. HENT:      Head: Normocephalic. Right Ear: Tympanic membrane normal.      Left Ear: Tympanic membrane normal.      Nose: Nose normal.   Cardiovascular:      Rate and Rhythm: Normal rate and regular rhythm. Pulses: Normal pulses. Heart sounds: Normal heart sounds.    Pulmonary:      Effort: Pulmonary effort is normal.      Breath sounds: Normal breath sounds. Abdominal:      Palpations: Abdomen is soft. Musculoskeletal:      Left hand: He exhibits tenderness and bony tenderness. Hands:       Comments: Mild swelling mp joint ring finger   Skin:     General: Skin is warm and dry. Neurological:      Mental Status: He is alert. Diagnoses and all orders for this visit:    1. Essential hypertension, benign,controlled  -     METABOLIC PANEL, COMPREHENSIVE; Future  -     CBC WITH AUTOMATED DIFF; Future    2. Cerebral aneurysm,s/p coiling    3. Mixed hyperlipidemia  -     LIPID PANEL; Future    4. Gastroesophageal reflux disease with esophagitis without hemorrhage,controlled    5. Nocturia  -     PSA, DIAGNOSTIC (PROSTATE SPECIFIC AG); Future  -     PSA, DIAGNOSTIC (PROSTATE SPECIFIC AG); Future    6. Primary osteoarthritis of left knee  -     ibuprofen (MOTRIN) 400 mg tablet; Take 1 Tab by mouth every six (6) hours as needed for Pain. 7. Erectile dysfunction, unspecified erectile dysfunction type  -     sildenafil citrate (Viagra) 50 mg tablet; Take 1 Tab by mouth as needed for Erectile Dysfunction. 8. Pain of left hand,likely oa,recommend Voltaren Gel OTC      Follow-up and Dispositions    · Return in about 4 months (around 7/22/2021). Follow-up and Dispositions    · Return in about 4 months (around 7/22/2021).

## 2021-03-22 NOTE — PROGRESS NOTES
Chief Complaint   Patient presents with    Hypertension     follow up    Cholesterol Problem     follow up    Hand Pain     left hand       1. Have you been to the ER, urgent care clinic since your last visit? Hospitalized since your last visit?no    2. Have you seen or consulted any other health care providers outside of the [address] [place] [address] since your last visit? Include any pap smears or colon screening.  no

## 2021-03-25 LAB
ALBUMIN SERPL-MCNC: 4.5 G/DL (ref 3.8–4.8)
ALBUMIN/GLOB SERPL: 1.6 {RATIO} (ref 1.2–2.2)
ALP SERPL-CCNC: 110 IU/L (ref 39–117)
ALT SERPL-CCNC: 32 IU/L (ref 0–44)
AST SERPL-CCNC: 28 IU/L (ref 0–40)
BASOPHILS # BLD AUTO: 0.1 X10E3/UL (ref 0–0.2)
BASOPHILS NFR BLD AUTO: 1 %
BILIRUB SERPL-MCNC: 1 MG/DL (ref 0–1.2)
BUN SERPL-MCNC: 10 MG/DL (ref 8–27)
BUN/CREAT SERPL: 10 (ref 10–24)
CALCIUM SERPL-MCNC: 9.9 MG/DL (ref 8.6–10.2)
CHLORIDE SERPL-SCNC: 97 MMOL/L (ref 96–106)
CHOLEST SERPL-MCNC: 163 MG/DL (ref 100–199)
CO2 SERPL-SCNC: 28 MMOL/L (ref 20–29)
CREAT SERPL-MCNC: 1 MG/DL (ref 0.76–1.27)
EOSINOPHIL # BLD AUTO: 0.3 X10E3/UL (ref 0–0.4)
EOSINOPHIL NFR BLD AUTO: 4 %
ERYTHROCYTE [DISTWIDTH] IN BLOOD BY AUTOMATED COUNT: 13.1 % (ref 11.6–15.4)
GLOBULIN SER CALC-MCNC: 2.9 G/DL (ref 1.5–4.5)
GLUCOSE SERPL-MCNC: 136 MG/DL (ref 65–99)
HCT VFR BLD AUTO: 42.3 % (ref 37.5–51)
HDLC SERPL-MCNC: 48 MG/DL
HGB BLD-MCNC: 14.2 G/DL (ref 13–17.7)
IMM GRANULOCYTES # BLD AUTO: 0 X10E3/UL (ref 0–0.1)
IMM GRANULOCYTES NFR BLD AUTO: 0 %
IMP & REVIEW OF LAB RESULTS: NORMAL
LDLC SERPL CALC-MCNC: 93 MG/DL (ref 0–99)
LYMPHOCYTES # BLD AUTO: 2.4 X10E3/UL (ref 0.7–3.1)
LYMPHOCYTES NFR BLD AUTO: 30 %
MCH RBC QN AUTO: 28.9 PG (ref 26.6–33)
MCHC RBC AUTO-ENTMCNC: 33.6 G/DL (ref 31.5–35.7)
MCV RBC AUTO: 86 FL (ref 79–97)
MONOCYTES # BLD AUTO: 0.7 X10E3/UL (ref 0.1–0.9)
MONOCYTES NFR BLD AUTO: 9 %
NEUTROPHILS # BLD AUTO: 4.7 X10E3/UL (ref 1.4–7)
NEUTROPHILS NFR BLD AUTO: 56 %
PLATELET # BLD AUTO: 312 X10E3/UL (ref 150–450)
POTASSIUM SERPL-SCNC: 4 MMOL/L (ref 3.5–5.2)
PROT SERPL-MCNC: 7.4 G/DL (ref 6–8.5)
PSA SERPL-MCNC: 1.9 NG/ML (ref 0–4)
RBC # BLD AUTO: 4.92 X10E6/UL (ref 4.14–5.8)
SODIUM SERPL-SCNC: 140 MMOL/L (ref 134–144)
TRIGL SERPL-MCNC: 123 MG/DL (ref 0–149)
VLDLC SERPL CALC-MCNC: 22 MG/DL (ref 5–40)
WBC # BLD AUTO: 8.2 X10E3/UL (ref 3.4–10.8)

## 2021-04-06 DIAGNOSIS — I10 ESSENTIAL HYPERTENSION, BENIGN: ICD-10-CM

## 2021-04-06 RX ORDER — DILTIAZEM HYDROCHLORIDE 240 MG/1
CAPSULE, COATED, EXTENDED RELEASE ORAL
Qty: 90 CAP | Refills: 1 | Status: SHIPPED | OUTPATIENT
Start: 2021-04-06 | End: 2021-07-11

## 2021-04-08 DIAGNOSIS — M79.642 PAIN OF LEFT HAND: Primary | ICD-10-CM

## 2021-05-01 DIAGNOSIS — E78.2 MIXED HYPERLIPIDEMIA: ICD-10-CM

## 2021-05-02 RX ORDER — ATORVASTATIN CALCIUM 10 MG/1
TABLET, FILM COATED ORAL
Qty: 90 TAB | Refills: 3 | Status: SHIPPED | OUTPATIENT
Start: 2021-05-02 | End: 2021-05-03 | Stop reason: SDUPTHER

## 2021-05-03 DIAGNOSIS — E78.2 MIXED HYPERLIPIDEMIA: ICD-10-CM

## 2021-05-03 RX ORDER — ATORVASTATIN CALCIUM 10 MG/1
TABLET, FILM COATED ORAL
Qty: 90 TAB | Refills: 3 | Status: SHIPPED | OUTPATIENT
Start: 2021-05-03 | End: 2022-05-31

## 2021-05-03 RX ORDER — ATORVASTATIN CALCIUM 10 MG/1
TABLET, FILM COATED ORAL
Qty: 90 TAB | Refills: 3 | Status: CANCELLED | OUTPATIENT
Start: 2021-05-03

## 2021-05-03 NOTE — TELEPHONE ENCOUNTER
----- Message from Valencia Seo sent at 5/1/2021  9:28 AM EDT -----  Regarding: Dr. Trent Chawla (if not patient): pt      Relationship of caller (if not patient): pt      Best contact number(s): [phone]      Name of medication and dosage if known: \"atorvastatin 10mg\"      Is patient out of this medication (yes/no): yes      Pharmacy name: [facility] listed in chart? (yes/no): yes  Pharmacy phone number: [phone]    Date of last visit: 3/22/21    Details to clarify the request: n/a      Valencia Seo

## 2021-05-08 DIAGNOSIS — K21.00 GASTROESOPHAGEAL REFLUX DISEASE WITH ESOPHAGITIS WITHOUT HEMORRHAGE: ICD-10-CM

## 2021-05-09 RX ORDER — PANTOPRAZOLE SODIUM 20 MG/1
TABLET, DELAYED RELEASE ORAL
Qty: 90 TAB | Refills: 2 | Status: SHIPPED | OUTPATIENT
Start: 2021-05-09 | End: 2022-01-09

## 2021-05-26 DIAGNOSIS — M15.9 PRIMARY OSTEOARTHRITIS INVOLVING MULTIPLE JOINTS: ICD-10-CM

## 2021-05-26 DIAGNOSIS — S33.8XXA SPRAIN OF GROIN, INITIAL ENCOUNTER: ICD-10-CM

## 2021-05-26 RX ORDER — FAMOTIDINE 20 MG/1
TABLET, FILM COATED ORAL
Qty: 360 TABLET | Refills: 1 | Status: SHIPPED | OUTPATIENT
Start: 2021-05-26 | End: 2021-10-22 | Stop reason: ALTCHOICE

## 2021-05-27 RX ORDER — IBUPROFEN 800 MG/1
TABLET ORAL
Qty: 50 TABLET | Refills: 6 | Status: SHIPPED | OUTPATIENT
Start: 2021-05-27 | End: 2022-10-17 | Stop reason: SDUPTHER

## 2021-06-16 DIAGNOSIS — I10 ESSENTIAL HYPERTENSION, BENIGN: ICD-10-CM

## 2021-06-16 RX ORDER — HYDROCHLOROTHIAZIDE 25 MG/1
TABLET ORAL
Qty: 90 TABLET | Refills: 1 | Status: SHIPPED | OUTPATIENT
Start: 2021-06-16 | End: 2021-10-22 | Stop reason: ALTCHOICE

## 2021-07-11 DIAGNOSIS — I10 ESSENTIAL HYPERTENSION, BENIGN: ICD-10-CM

## 2021-07-11 RX ORDER — DILTIAZEM HYDROCHLORIDE 240 MG/1
CAPSULE, COATED, EXTENDED RELEASE ORAL
Qty: 90 CAPSULE | Refills: 1 | Status: SHIPPED | OUTPATIENT
Start: 2021-07-11 | End: 2021-07-22 | Stop reason: DRUGHIGH

## 2021-07-21 PROBLEM — E11.9 TYPE 2 DIABETES MELLITUS (HCC): Status: ACTIVE | Noted: 2021-07-21

## 2021-07-22 ENCOUNTER — OFFICE VISIT (OUTPATIENT)
Dept: FAMILY MEDICINE CLINIC | Age: 71
End: 2021-07-22
Payer: MEDICARE

## 2021-07-22 VITALS
TEMPERATURE: 99.1 F | RESPIRATION RATE: 20 BRPM | BODY MASS INDEX: 36.14 KG/M2 | WEIGHT: 266.8 LBS | HEIGHT: 72 IN | DIASTOLIC BLOOD PRESSURE: 90 MMHG | SYSTOLIC BLOOD PRESSURE: 130 MMHG | HEART RATE: 76 BPM | OXYGEN SATURATION: 97 %

## 2021-07-22 DIAGNOSIS — E66.01 SEVERE OBESITY (BMI 35.0-35.9 WITH COMORBIDITY) (HCC): ICD-10-CM

## 2021-07-22 DIAGNOSIS — E78.2 MIXED HYPERLIPIDEMIA: ICD-10-CM

## 2021-07-22 DIAGNOSIS — M15.9 PRIMARY OSTEOARTHRITIS INVOLVING MULTIPLE JOINTS: ICD-10-CM

## 2021-07-22 DIAGNOSIS — Z00.00 MEDICARE ANNUAL WELLNESS VISIT, SUBSEQUENT: Primary | ICD-10-CM

## 2021-07-22 DIAGNOSIS — I10 ESSENTIAL HYPERTENSION, BENIGN: ICD-10-CM

## 2021-07-22 DIAGNOSIS — I67.1 CEREBRAL ANEURYSM: ICD-10-CM

## 2021-07-22 PROCEDURE — G8510 SCR DEP NEG, NO PLAN REQD: HCPCS | Performed by: FAMILY MEDICINE

## 2021-07-22 PROCEDURE — 1101F PT FALLS ASSESS-DOCD LE1/YR: CPT | Performed by: FAMILY MEDICINE

## 2021-07-22 PROCEDURE — G8417 CALC BMI ABV UP PARAM F/U: HCPCS | Performed by: FAMILY MEDICINE

## 2021-07-22 PROCEDURE — G0463 HOSPITAL OUTPT CLINIC VISIT: HCPCS | Performed by: FAMILY MEDICINE

## 2021-07-22 PROCEDURE — G8536 NO DOC ELDER MAL SCRN: HCPCS | Performed by: FAMILY MEDICINE

## 2021-07-22 PROCEDURE — G8752 SYS BP LESS 140: HCPCS | Performed by: FAMILY MEDICINE

## 2021-07-22 PROCEDURE — G8755 DIAS BP > OR = 90: HCPCS | Performed by: FAMILY MEDICINE

## 2021-07-22 PROCEDURE — 99213 OFFICE O/P EST LOW 20 MIN: CPT | Performed by: FAMILY MEDICINE

## 2021-07-22 PROCEDURE — 3017F COLORECTAL CA SCREEN DOC REV: CPT | Performed by: FAMILY MEDICINE

## 2021-07-22 PROCEDURE — G8427 DOCREV CUR MEDS BY ELIG CLIN: HCPCS | Performed by: FAMILY MEDICINE

## 2021-07-22 PROCEDURE — G0439 PPPS, SUBSEQ VISIT: HCPCS | Performed by: FAMILY MEDICINE

## 2021-07-22 RX ORDER — DILTIAZEM HYDROCHLORIDE 300 MG/1
300 CAPSULE, COATED, EXTENDED RELEASE ORAL DAILY
Qty: 90 CAPSULE | Refills: 3 | Status: SHIPPED | OUTPATIENT
Start: 2021-07-22 | End: 2022-02-22

## 2021-07-22 NOTE — PROGRESS NOTES
HISTORY OF PRESENT ILLNESS  Syeda Hanna is a 78 y.o. male. f/u coiling aneurysm,hbp,chol,ar,mwv. Doing well,dizziness and sinus troubles much improved after sinus surgery and aneurysm coiling. Hypertension   The history is provided by the patient. This is a chronic problem. The problem has been gradually worsening. Associated symptoms include blurred vision and dizziness. Pertinent negatives include no chest pain, no orthopnea, no palpitations, no malaise/fatigue and no peripheral edema. Cholesterol Problem  The history is provided by the patient. This is a chronic problem. The problem occurs daily. The problem has not changed since onset. Pertinent negatives include no chest pain. Dizziness   The history is provided by the patient. The problem occurs rarely. The problem has been gradually improving. Associated symptoms include dizziness. Pertinent negatives include no chest pain, no palpitations, no fever, no malaise/fatigue and no melena. Review of Systems   Constitutional: Negative for fever and malaise/fatigue. HENT: Negative for hearing loss. Eyes: Positive for blurred vision. Respiratory: Negative for cough, hemoptysis and sputum production. Cardiovascular: Negative for chest pain, palpitations and orthopnea. Gastrointestinal: Negative for blood in stool, constipation and melena. Genitourinary: Negative for frequency. Neurological: Positive for dizziness. Psychiatric/Behavioral: Negative for depression. Physical Exam  Constitutional:       Appearance: Normal appearance. HENT:      Head: Normocephalic. Right Ear: Tympanic membrane normal.      Left Ear: Tympanic membrane normal.      Nose: Nose normal.   Cardiovascular:      Rate and Rhythm: Normal rate and regular rhythm. Pulses: Normal pulses. Heart sounds: Normal heart sounds. Pulmonary:      Effort: Pulmonary effort is normal.      Breath sounds: Normal breath sounds.    Abdominal:      Palpations: Abdomen is soft. Musculoskeletal:      Left hand: Tenderness and bony tenderness present. Hands:       Comments: Mild swelling mp joint ring finger   Skin:     General: Skin is warm and dry. Neurological:      Mental Status: He is alert and oriented to person, place, and time. Mental status is at baseline. Psychiatric:         Mood and Affect: Mood normal.         Diagnoses and all orders for this visit:    1. Medicare annual wellness visit, subsequent    2. Essential hypertension, benign,inadequate control ,will increase diltiazem  -     dilTIAZem ER (CARDIZEM CD) 300 mg capsule; Take 1 Capsule by mouth daily. 3. Severe obesity (BMI 35.0-35.9 with comorbidity) ([address]    4. Primary osteoarthritis involving multiple joints    5. Mixed hyperlipidemia    6. Cerebral aneurysm,s/p coiling      Doing well,continue current meds and treatments      Follow-up and Dispositions    · Return in about 3 months (around 10/22/2021). Follow-up and Dispositions    · Return in about 3 months (around 10/22/2021).

## 2021-07-22 NOTE — PROGRESS NOTES
Chief Complaint   Patient presents with    Hypertension     follow up    Cholesterol Problem     follow up     1. Have you been to the ER, urgent care clinic since your last visit? Hospitalized since your last visit?no    2. Have you seen or consulted any other health care providers outside of the [address] [place] [address] since your last visit? Include any pap smears or colon screening.  no

## 2021-07-22 NOTE — PROGRESS NOTES
This is the Subsequent Medicare Annual Wellness Exam, performed 12 months or more after the Initial AWV or the last Subsequent AWV    I have reviewed the patient's medical history in detail and updated the computerized patient record. Assessment/Plan   Education and counseling provided:  Are appropriate based on today's review and evaluation    1. Medicare annual wellness visit, subsequent  2. Essential hypertension, benign  -     dilTIAZem ER (CARDIZEM CD) 300 mg capsule; Take 1 Capsule by mouth daily. , Normal, Disp-90 Capsule, R-3  3. Severe obesity (BMI 35.0-35.9 with comorbidity) ([facility] [address]  4. Primary osteoarthritis involving multiple joints  5. Mixed hyperlipidemia  6. Cerebral aneurysm       Depression Risk Factor Screening     3 most recent PHQ Screens 2021   Little interest or pleasure in doing things Not at all   Feeling down, depressed, irritable, or hopeless Not at all   Total Score PHQ 2 0       Alcohol Risk Screen    Do you average more than 1 drink per night or more than 7 drinks a week: No    In the past three months have you have had more than 4 drinks containing alcohol on one occasion: No        Functional Ability and Level of Safety    Hearing: Hearing is good. Activities of Daily Living: The home contains: handrails  Patient does total self care      Ambulation: with no difficulty     Fall Risk:  Fall Risk Assessment, last 12 mths 2021   Able to walk? Yes   Fall in past 12 months? 0   Do you feel unsteady?  0   Are you worried about falling 0      Abuse Screen:  Patient is not abused       Cognitive Screening    Has your family/caregiver stated any concerns about your memory: no     Cognitive Screenin    Health Maintenance Due     Health Maintenance Due   Topic Date Due    MICROALBUMIN Q1  Never done    Eye Exam Retinal or Dilated  Never done    Shingrix Vaccine Age 50> (1 of 2) Never done    A1C test (Diabetic or Prediabetic)  2018    Foot Exam Q1  2018    Medicare Yearly Exam  07/24/2021       Patient Care Team   Patient Care Team:  [facility]  Ary Hoang MD as Physician (Ophthalmology)    History     Patient Active Problem List   Diagnosis Code    Essential hypertension, benign I10    Glaucoma H40.9    Asthma J45.909    Allergic rhinitis J30.9    OA (osteoarthritis) M19.90    Encounter for long-term (current) use of other medications Z79.899    Mixed hyperlipidemia E78.2    Nocturia R35.1    Cervical radiculitis M54.12    GERD (gastroesophageal reflux disease) K21.9    Chronic maxillary sinusitis J32.0    Severe obesity ([address] E66.01    Peripheral vascular disease ([address] I73.9    Type 2 diabetes mellitus E11.9     Past Medical History:   Diagnosis Date    Allergic rhinitis, cause unspecified 11/4/2010    Asthma 11/4/2010    Cervical radiculitis 11/9/2012    Encounter for long-term (current) use of other medications 11/26/2010    Essential hypertension, benign 11/4/2010    Glaucoma 11/4/2010    Hypertension     Mixed hyperlipidemia 5/27/2011    Nocturia 11/9/2012    OA (osteoarthritis) 11/26/2010    Type 2 diabetes mellitus 7/21/2021      Past Surgical History:   Procedure Laterality Date    HX COLONOSCOPY      IL NASAL SCOPY,OPEN MAXILL SINUS       Current Outpatient Medications   Medication Sig Dispense Refill    dilTIAZem ER (CARDIZEM CD) 300 mg capsule Take 1 Capsule by mouth daily.  90 Capsule 3    hydroCHLOROthiazide (HYDRODIURIL) 25 mg tablet TAKE 1 TABLET BY MOUTH EVERY DAY 90 Tablet 1    ibuprofen (MOTRIN) 800 mg tablet TAKE 1 TABLET BY MOUTH EVERY 8 HOURS AS NEEDED FOR PAIN 50 Tablet 6    famotidine (PEPCID) 20 mg tablet TAKE 2 TABLETS BY MOUTH TWICE A  Tablet 1    pantoprazole (PROTONIX) 20 mg tablet TAKE 1 TABLET BY MOUTH EVERY DAY 90 Tab 2    atorvastatin (LIPITOR) 10 mg tablet TAKE 1 TABLET BY MOUTH EVERY DAY 90 Tab 3    sildenafil citrate (Viagra) 50 mg tablet Take 1 Tab by mouth as needed for Erectile Dysfunction. 4 Tab 5    ramipriL (ALTACE) 10 mg capsule TAKE 2 CAPSULES BY MOUTH EVERY  Cap 3    aspirin (ASPIRIN) 325 mg tablet Take 325 mg by mouth two (2) times a day.  albuterol (ProAir HFA) 90 mcg/actuation inhaler Take 2 Puffs by inhalation every six (6) hours as needed for Wheezing. 1 Inhaler 11    brimonidine-timolol (COMBIGAN) 0.2-0.5 % drop ophthalmic solution Administer 1 Drop to both eyes three (3) times daily.  diclofenac (VOLTAREN) 1 % gel Apply 4 g to affected area four (4) times daily.  EPINEPHrine (EPIPEN) 0.3 mg/0.3 mL injection INJECT INTRAMUSCULARLY ONCE. MAY REPEAT IF NECESSARY  0    montelukast (SINGULAIR) 10 mg tablet Take 10 mg by mouth daily.  triamcinolone acetonide (KENALOG) 0.1 % topical cream Apply  to affected area three (3) times daily as needed for Skin Irritation. 85 g 2    fexofenadine (ALLEGRA) 180 mg tablet Take  by mouth.  multivitamin (ONE A DAY) tablet Take 1 Tab by mouth daily.  SALINE NASAL 0.65 % nasal spray INHALE 2 SPRAYS IN EACH NOSTRIL 4 TIMES A DAY FOR 14 DAYS  2    LUMIGAN 0.01 % ophthalmic drops Administer 1 Drop to both eyes nightly. 11    loteprednol etabonate (ALREX) 0.2 % drps Administer 1 Drop to both eyes four (4) times daily as needed.  famotidine (PEPCID) 40 mg tablet Take 40 mg by mouth daily. (Patient not taking: Reported on 7/22/2021)      ibuprofen (MOTRIN) 400 mg tablet Take 1 Tab by mouth every six (6) hours as needed for Pain. (Patient not taking: Reported on 7/22/2021) 30 Tab 11    dilTIAZem ER (Cartia XT) 300 mg capsule Take 1 Cap by mouth daily.  (Patient not taking: Reported on 7/22/2021) 30 Cap 11     Allergies   Allergen Reactions    Latex, Natural Rubber Itching    Diclofenac Other (comments)     Ringing in ears    Propoxyphene-Acetaminophen Rash    Sulfa (Sulfonamide Antibiotics) Shortness of Breath     Pt states he is not allergic.     Sulindac Diarrhea    Zithromax [Azithromycin] Rash       Family History   Problem Relation Age of Onset    Heart Disease Mother     Hypertension Mother     Heart Disease Father     No Known Problems Brother     Cancer Brother      Social History     Tobacco Use    Smoking status: Never Smoker    Smokeless tobacco: Never Used   Substance Use Topics    Alcohol use: No         Jim Sands MD

## 2021-10-22 ENCOUNTER — OFFICE VISIT (OUTPATIENT)
Dept: FAMILY MEDICINE CLINIC | Age: 71
End: 2021-10-22
Payer: MEDICARE

## 2021-10-22 VITALS
HEART RATE: 79 BPM | DIASTOLIC BLOOD PRESSURE: 100 MMHG | BODY MASS INDEX: 35.95 KG/M2 | RESPIRATION RATE: 20 BRPM | OXYGEN SATURATION: 99 % | TEMPERATURE: 98.8 F | WEIGHT: 265.4 LBS | HEIGHT: 72 IN | SYSTOLIC BLOOD PRESSURE: 158 MMHG

## 2021-10-22 DIAGNOSIS — I10 ESSENTIAL HYPERTENSION, BENIGN: Primary | ICD-10-CM

## 2021-10-22 DIAGNOSIS — I67.1 CEREBRAL ANEURYSM: ICD-10-CM

## 2021-10-22 DIAGNOSIS — E11.9 TYPE 2 DIABETES MELLITUS WITHOUT COMPLICATION, WITHOUT LONG-TERM CURRENT USE OF INSULIN (HCC): ICD-10-CM

## 2021-10-22 DIAGNOSIS — E78.2 MIXED HYPERLIPIDEMIA: ICD-10-CM

## 2021-10-22 DIAGNOSIS — Z87.898 HISTORY OF PREDIABETES: ICD-10-CM

## 2021-10-22 LAB — HBA1C MFR BLD HPLC: 6.7 %

## 2021-10-22 PROCEDURE — 83036 HEMOGLOBIN GLYCOSYLATED A1C: CPT | Performed by: FAMILY MEDICINE

## 2021-10-22 PROCEDURE — 3044F HG A1C LEVEL LT 7.0%: CPT | Performed by: FAMILY MEDICINE

## 2021-10-22 PROCEDURE — G8427 DOCREV CUR MEDS BY ELIG CLIN: HCPCS | Performed by: FAMILY MEDICINE

## 2021-10-22 PROCEDURE — 99214 OFFICE O/P EST MOD 30 MIN: CPT | Performed by: FAMILY MEDICINE

## 2021-10-22 PROCEDURE — G8755 DIAS BP > OR = 90: HCPCS | Performed by: FAMILY MEDICINE

## 2021-10-22 PROCEDURE — G8417 CALC BMI ABV UP PARAM F/U: HCPCS | Performed by: FAMILY MEDICINE

## 2021-10-22 PROCEDURE — 2022F DILAT RTA XM EVC RTNOPTHY: CPT | Performed by: FAMILY MEDICINE

## 2021-10-22 PROCEDURE — G8536 NO DOC ELDER MAL SCRN: HCPCS | Performed by: FAMILY MEDICINE

## 2021-10-22 PROCEDURE — G8510 SCR DEP NEG, NO PLAN REQD: HCPCS | Performed by: FAMILY MEDICINE

## 2021-10-22 PROCEDURE — G0463 HOSPITAL OUTPT CLINIC VISIT: HCPCS | Performed by: FAMILY MEDICINE

## 2021-10-22 PROCEDURE — 3017F COLORECTAL CA SCREEN DOC REV: CPT | Performed by: FAMILY MEDICINE

## 2021-10-22 PROCEDURE — 1101F PT FALLS ASSESS-DOCD LE1/YR: CPT | Performed by: FAMILY MEDICINE

## 2021-10-22 PROCEDURE — G8753 SYS BP > OR = 140: HCPCS | Performed by: FAMILY MEDICINE

## 2021-10-22 RX ORDER — METFORMIN HYDROCHLORIDE 750 MG/1
750 TABLET, EXTENDED RELEASE ORAL DAILY
Qty: 30 TABLET | Refills: 5 | Status: SHIPPED | OUTPATIENT
Start: 2021-10-22 | End: 2022-02-01

## 2021-10-22 RX ORDER — BISOPROLOL FUMARATE AND HYDROCHLOROTHIAZIDE 2.5; 6.25 MG/1; MG/1
1 TABLET ORAL DAILY
Qty: 30 TABLET | Refills: 11 | Status: SHIPPED | OUTPATIENT
Start: 2021-10-22 | End: 2022-10-06

## 2021-10-22 NOTE — PROGRESS NOTES
HISTORY OF PRESENT ILLNESS  Ann Fernandes is a 78 y.o. male. f/u coiling aneurysm,hbp,chol,ar,ed. Feeling well. Has not had Meds yet this am  Hypertension   The history is provided by the patient. This is a chronic problem. The problem has been gradually worsening. Associated symptoms include blurred vision and dizziness. Pertinent negatives include no chest pain, no orthopnea, no palpitations, no malaise/fatigue and no peripheral edema. Cholesterol Problem  The history is provided by the patient. This is a chronic problem. The problem occurs daily. The problem has not changed since onset. Pertinent negatives include no chest pain. Dizziness   The history is provided by the patient. The problem occurs rarely. The problem has been resolved. Associated symptoms include dizziness. Pertinent negatives include no chest pain, no palpitations, no fever, no malaise/fatigue, no back pain and no melena. Diabetes  The history is provided by the patient. This is a new problem. The problem occurs daily. The problem has not changed since onset. Pertinent negatives include no chest pain. Review of Systems   Constitutional: Negative for fever and malaise/fatigue. HENT: Negative for hearing loss. Eyes: Positive for blurred vision. Respiratory: Negative for cough, hemoptysis and sputum production. Cardiovascular: Negative for chest pain, palpitations and orthopnea. Gastrointestinal: Negative for blood in stool, constipation and melena. Genitourinary: Negative for frequency. Musculoskeletal: Negative for back pain and myalgias. Neurological: Positive for dizziness. Psychiatric/Behavioral: Negative for depression. Physical Exam  Constitutional:       Appearance: Normal appearance. He is obese. HENT:      Head: Normocephalic.       Right Ear: Tympanic membrane normal.      Left Ear: Tympanic membrane normal.      Nose: Nose normal.   Cardiovascular:      Rate and Rhythm: Normal rate and regular rhythm. Pulses: Normal pulses. Heart sounds: Normal heart sounds. Pulmonary:      Effort: Pulmonary effort is normal.      Breath sounds: Normal breath sounds. Abdominal:      Palpations: Abdomen is soft. Musculoskeletal:      Left hand: No tenderness or bony tenderness. Hands:       Cervical back: Normal range of motion and neck supple. Skin:     General: Skin is warm and dry. Neurological:      Mental Status: He is alert and oriented to person, place, and time. Mental status is at baseline. Psychiatric:         Mood and Affect: Mood normal.         Diagnoses and all orders for this visit:    1. Essential hypertension, benign,inadequate control,will add BB  -     METABOLIC PANEL, COMPREHENSIVE; Future  -     bisoprolol-hydroCHLOROthiazide (ZIAC) 2.5-6.25 mg per tablet; Take 1 Tablet by mouth daily. 2. History of prediabetes    3. Mixed hyperlipidemia  -     LIPID PANEL; Future    4. Cerebral aneurysm    5. Type 2 diabetes mellitus without complication, without long-term current use of insulin (HCC)  -     HEMOGLOBIN A1C WITH EAG; Future  -     metFORMIN ER (GLUCOPHAGE XR) 750 mg tablet; Take 1 Tablet by mouth daily.  -     AMB POC HEMOGLOBIN A1C            Doing well,continue current meds and treatments      Follow-up and Dispositions    · Return in about 3 months (around 1/22/2022). Follow-up and Dispositions    · Return in about 3 months (around 1/22/2022).

## 2021-10-22 NOTE — PROGRESS NOTES
Chief Complaint   Patient presents with    Diabetes     follow up    Hypertension     follow up    Cholesterol Problem     follow up     1. Have you been to the ER, urgent care clinic since your last visit? Hospitalized since your last visit?no    2. Have you seen or consulted any other health care providers outside of the [address] [place] [address] since your last visit? Include any pap smears or colon screening.  no

## 2021-10-23 LAB
ALBUMIN SERPL-MCNC: 4.5 G/DL (ref 3.8–4.8)
ALBUMIN/GLOB SERPL: 1.8 {RATIO} (ref 1.2–2.2)
ALP SERPL-CCNC: 113 IU/L (ref 44–121)
ALT SERPL-CCNC: 26 IU/L (ref 0–44)
AST SERPL-CCNC: 21 IU/L (ref 0–40)
BILIRUB SERPL-MCNC: 0.6 MG/DL (ref 0–1.2)
BUN SERPL-MCNC: 12 MG/DL (ref 8–27)
BUN/CREAT SERPL: 11 (ref 10–24)
CALCIUM SERPL-MCNC: 9.9 MG/DL (ref 8.6–10.2)
CHLORIDE SERPL-SCNC: 99 MMOL/L (ref 96–106)
CHOLEST SERPL-MCNC: 169 MG/DL (ref 100–199)
CO2 SERPL-SCNC: 23 MMOL/L (ref 20–29)
CREAT SERPL-MCNC: 1.1 MG/DL (ref 0.76–1.27)
EST. AVERAGE GLUCOSE BLD GHB EST-MCNC: 160 MG/DL
GLOBULIN SER CALC-MCNC: 2.5 G/DL (ref 1.5–4.5)
GLUCOSE SERPL-MCNC: 181 MG/DL (ref 65–99)
HBA1C MFR BLD: 7.2 % (ref 4.8–5.6)
HDLC SERPL-MCNC: 53 MG/DL
IMP & REVIEW OF LAB RESULTS: NORMAL
LDLC SERPL CALC-MCNC: 98 MG/DL (ref 0–99)
POTASSIUM SERPL-SCNC: 4.2 MMOL/L (ref 3.5–5.2)
PROT SERPL-MCNC: 7 G/DL (ref 6–8.5)
SODIUM SERPL-SCNC: 139 MMOL/L (ref 134–144)
SPECIMEN STATUS REPORT, ROLRST: NORMAL
TRIGL SERPL-MCNC: 100 MG/DL (ref 0–149)
VLDLC SERPL CALC-MCNC: 18 MG/DL (ref 5–40)

## 2021-11-01 ENCOUNTER — TELEPHONE (OUTPATIENT)
Dept: FAMILY MEDICINE CLINIC | Age: 71
End: 2021-11-01

## 2021-11-01 NOTE — TELEPHONE ENCOUNTER
----- Message from Richa Trinh sent at 11/1/2021 10:35 AM EDT -----  Subject: Message to Provider    QUESTIONS  Information for Provider? Patient would like to know the name of the name   of the test to check for A1C. He wants to buy some OTC. Please call   patient with this information.   ---------------------------------------------------------------------------  --------------  CALL BACK INFO  What is the best way for the office to contact you? OK to leave message on   voicemail  Preferred Call Back Phone Number? [phone]  ---------------------------------------------------------------------------  --------------  SCRIPT ANSWERS  Relationship to Patient?  Self

## 2021-12-29 ENCOUNTER — CLINICAL SUPPORT (OUTPATIENT)
Dept: FAMILY MEDICINE CLINIC | Age: 71
End: 2021-12-29

## 2021-12-29 DIAGNOSIS — J20.9 ACUTE BRONCHITIS, UNSPECIFIED ORGANISM: Primary | ICD-10-CM

## 2021-12-29 RX ORDER — AMOXICILLIN 500 MG/1
500 CAPSULE ORAL 3 TIMES DAILY
Qty: 21 CAPSULE | Refills: 0 | Status: SHIPPED | OUTPATIENT
Start: 2021-12-29 | End: 2022-01-07 | Stop reason: SDUPTHER

## 2021-12-31 LAB
SARS-COV-2, NAA 2 DAY TAT: NORMAL
SARS-COV-2, NAA: DETECTED

## 2022-01-07 ENCOUNTER — NURSE TRIAGE (OUTPATIENT)
Dept: OTHER | Facility: CLINIC | Age: 72
End: 2022-01-07

## 2022-01-07 DIAGNOSIS — J20.9 ACUTE BRONCHITIS, UNSPECIFIED ORGANISM: ICD-10-CM

## 2022-01-07 RX ORDER — AMOXICILLIN 500 MG/1
500 CAPSULE ORAL 3 TIMES DAILY
Qty: 21 CAPSULE | Refills: 0 | Status: SHIPPED | OUTPATIENT
Start: 2022-01-07 | End: 2022-01-14

## 2022-01-07 NOTE — TELEPHONE ENCOUNTER
Received call from April at [facility] with Red Flag Complaint. Subjective: Caller states \"Lightheaded and nauseated\"     Current Symptoms: Diagnosed with COVID 12-29-21; Completed antibiotics today. Continual unchanged lightheadedness and nausea. Denies dizziness or new symptoms. Patient wants to talk with providers office about his quarantine time frame and discuss follow up from previous visit. Recommended disposition: Call back by PCP today    Care advice provided, patient verbalizes understanding; denies any other questions or concerns; instructed to call back for any new or worsening symptoms. Patient/caller to follow-up with PCP     Attention Provider: Thank you for allowing me to participate in the care of your patient. The patient was connected to triage in response to information provided to the ECC. Please do not respond through this encounter as the response is not directed to a shared pool.       Reason for Disposition   Nursing judgment    Protocols used: INFORMATION ONLY CALL - NO TRIAGE-ADULT-[place]

## 2022-01-07 NOTE — TELEPHONE ENCOUNTER
Last visit:10/22/21  Next visit:1/21/22  Previous refill 12/29/21(21 caps+0R)    Requested Prescriptions     Pending Prescriptions Disp Refills    amoxicillin (AMOXIL) 500 mg capsule 21 Capsule 0     Sig: Take 1 Capsule by mouth three (3) times daily for 7 days.

## 2022-01-07 NOTE — TELEPHONE ENCOUNTER
Received call from April at [facility], caller not on line.      Complaint: Lightheaded    Market: [employer] Paredes Name: [facility] telephone number verified as [phone]    Connected with caller via phone, please see below triage

## 2022-01-09 DIAGNOSIS — K21.00 GASTROESOPHAGEAL REFLUX DISEASE WITH ESOPHAGITIS WITHOUT HEMORRHAGE: ICD-10-CM

## 2022-01-09 RX ORDER — PANTOPRAZOLE SODIUM 20 MG/1
TABLET, DELAYED RELEASE ORAL
Qty: 90 TABLET | Refills: 2 | Status: SHIPPED | OUTPATIENT
Start: 2022-01-09 | End: 2022-09-21 | Stop reason: ALTCHOICE

## 2022-01-09 RX ORDER — RAMIPRIL 10 MG/1
CAPSULE ORAL
Qty: 180 CAPSULE | Refills: 3 | Status: SHIPPED | OUTPATIENT
Start: 2022-01-09 | End: 2022-10-16

## 2022-01-25 ENCOUNTER — VIRTUAL VISIT (OUTPATIENT)
Dept: FAMILY MEDICINE CLINIC | Age: 72
End: 2022-01-25
Payer: MEDICARE

## 2022-01-25 DIAGNOSIS — I10 ESSENTIAL HYPERTENSION, BENIGN: ICD-10-CM

## 2022-01-25 DIAGNOSIS — J20.9 ACUTE BRONCHITIS, UNSPECIFIED ORGANISM: Primary | ICD-10-CM

## 2022-01-25 DIAGNOSIS — E11.9 TYPE 2 DIABETES MELLITUS WITHOUT COMPLICATION, WITHOUT LONG-TERM CURRENT USE OF INSULIN (HCC): ICD-10-CM

## 2022-01-25 DIAGNOSIS — U07.1 COVID-19: ICD-10-CM

## 2022-01-25 DIAGNOSIS — K21.00 GASTROESOPHAGEAL REFLUX DISEASE WITH ESOPHAGITIS WITHOUT HEMORRHAGE: ICD-10-CM

## 2022-01-25 PROCEDURE — 3046F HEMOGLOBIN A1C LEVEL >9.0%: CPT | Performed by: FAMILY MEDICINE

## 2022-01-25 PROCEDURE — G8510 SCR DEP NEG, NO PLAN REQD: HCPCS | Performed by: FAMILY MEDICINE

## 2022-01-25 PROCEDURE — G8417 CALC BMI ABV UP PARAM F/U: HCPCS | Performed by: FAMILY MEDICINE

## 2022-01-25 PROCEDURE — 99214 OFFICE O/P EST MOD 30 MIN: CPT | Performed by: FAMILY MEDICINE

## 2022-01-25 PROCEDURE — G0463 HOSPITAL OUTPT CLINIC VISIT: HCPCS | Performed by: FAMILY MEDICINE

## 2022-01-25 PROCEDURE — G8536 NO DOC ELDER MAL SCRN: HCPCS | Performed by: FAMILY MEDICINE

## 2022-01-25 PROCEDURE — 2022F DILAT RTA XM EVC RTNOPTHY: CPT | Performed by: FAMILY MEDICINE

## 2022-01-25 PROCEDURE — 1101F PT FALLS ASSESS-DOCD LE1/YR: CPT | Performed by: FAMILY MEDICINE

## 2022-01-25 PROCEDURE — G8756 NO BP MEASURE DOC: HCPCS | Performed by: FAMILY MEDICINE

## 2022-01-25 PROCEDURE — 3017F COLORECTAL CA SCREEN DOC REV: CPT | Performed by: FAMILY MEDICINE

## 2022-01-25 PROCEDURE — G8427 DOCREV CUR MEDS BY ELIG CLIN: HCPCS | Performed by: FAMILY MEDICINE

## 2022-01-25 RX ORDER — AMOXICILLIN 500 MG/1
500 CAPSULE ORAL 3 TIMES DAILY
Qty: 15 CAPSULE | Refills: 0 | Status: SHIPPED | OUTPATIENT
Start: 2022-01-25 | End: 2022-01-30

## 2022-01-25 NOTE — PROGRESS NOTES
HISTORY OF PRESENT ILLNESS  Patient encounter by synchronous (real time) audio-visual technology which is patient initiated. The Patient is aware that this encounter is a billable service with coverage determined by their insurance carrier,as discussed at the time of check in. The patient has given verbal consent to proceed    Ana Barnes is a 73 y.o. male. C/o ongoing cough and chest congestion since 12/29/21 when pt tested positive for Covid. Feeling some better,fever chills,myalgias have resolved  Cough  The history is provided by the patient. This is a chronic problem. The current episode started more than 1 week ago. The problem has been gradually improving. Associated symptoms include shortness of breath. Pertinent negatives include no chest pain. Cold Symptoms  The history is provided by the patient. This is a new problem. The current episode started more than 1 week ago. The problem has not changed since onset. There has been no fever. Associated symptoms include shortness of breath. Pertinent negatives include no chest pain and no nausea. Hypertension   The history is provided by the patient. This is a chronic problem. The problem has not changed since onset. Associated symptoms include malaise/fatigue and shortness of breath. Pertinent negatives include no chest pain, no orthopnea, no palpitations and no nausea. Diabetes  The history is provided by the patient. This is a chronic problem. The problem occurs daily. Associated symptoms include shortness of breath. Pertinent negatives include no chest pain. Review of Systems   Constitutional: Positive for malaise/fatigue. Negative for fever. HENT: Positive for congestion. Respiratory: Positive for cough and shortness of breath. Cardiovascular: Negative for chest pain, palpitations and orthopnea. Gastrointestinal: Negative for heartburn and nausea. Skin: Negative for rash.        Physical Exam     Appearance: no distress,alert cooperative  HEENT:no abnormalities visible  Chest: unlabored respirations  Skin: no pallor or cyanosis  Neuro:alert oriented,grossly intact      Diagnoses and all orders for this visit:    1. Acute bronchitis, unspecified organism  -     amoxicillin (AMOXIL) 500 mg capsule; Take 1 Capsule by mouth three (3) times daily for 5 days. 2. COVID-19    3. Essential hypertension, benign    4. Type 2 diabetes mellitus without complication, without long-term current use of insulin ([facility] [address]    5. Gastroesophageal reflux disease with esophagitis without hemorrhage        Due to this being a telehealth encounter (During  [address] [facility]),evaluation of the following organ systems was limited:Vitals/Constitutional/EENT,Respiratory,CV,GI,,MS,Neuro,Skin /Heme-Lymph-Imm  Pursuant to the emergency declaration under the [address] [place] [address] waiver authority and the [employer] and [facility] Appropriations Act,this Virtual Visit was conducted ,with patient consent,to reduce the patients risk of exposure to Covid 19 and to provide continuity of care  for an established patient. Services were provided through a video synchronous discussion virtually to substitute for in person appointment. This visit was completed using D[employer]. me    Time in Virtual Visit:15 minutes

## 2022-01-25 NOTE — PROGRESS NOTES
Chief Complaint   Patient presents with    Diabetes     F/U on diabetes.  Hypertension     F/U on BP.  Cholesterol Problem     F/U on cholesterol. 1. Have you been to the ER, urgent care clinic since your last visit? Hospitalized since your last visit? No    2. Have you seen or consulted any other health care providers outside of the [address] [place] [address] since your last visit? Include any pap smears or colon screening.  No

## 2022-01-31 DIAGNOSIS — E11.9 TYPE 2 DIABETES MELLITUS WITHOUT COMPLICATION, WITHOUT LONG-TERM CURRENT USE OF INSULIN (HCC): ICD-10-CM

## 2022-02-01 RX ORDER — METFORMIN HYDROCHLORIDE 750 MG/1
TABLET, EXTENDED RELEASE ORAL
Qty: 90 TABLET | Refills: 1 | Status: SHIPPED | OUTPATIENT
Start: 2022-02-01 | End: 2022-02-18 | Stop reason: ALTCHOICE

## 2022-02-17 ENCOUNTER — NURSE TRIAGE (OUTPATIENT)
Dept: OTHER | Facility: CLINIC | Age: 72
End: 2022-02-17

## 2022-02-18 ENCOUNTER — OFFICE VISIT (OUTPATIENT)
Dept: FAMILY MEDICINE CLINIC | Age: 72
End: 2022-02-18
Payer: MEDICARE

## 2022-02-18 VITALS
HEART RATE: 66 BPM | BODY MASS INDEX: 35.3 KG/M2 | OXYGEN SATURATION: 98 % | DIASTOLIC BLOOD PRESSURE: 86 MMHG | SYSTOLIC BLOOD PRESSURE: 142 MMHG | RESPIRATION RATE: 18 BRPM | HEIGHT: 72 IN | WEIGHT: 260.6 LBS | TEMPERATURE: 98.7 F

## 2022-02-18 DIAGNOSIS — E66.01 SEVERE OBESITY (BMI 35.0-39.9) WITH COMORBIDITY (HCC): ICD-10-CM

## 2022-02-18 DIAGNOSIS — M54.50 CHRONIC BILATERAL LOW BACK PAIN WITHOUT SCIATICA: ICD-10-CM

## 2022-02-18 DIAGNOSIS — E78.2 MIXED HYPERLIPIDEMIA: ICD-10-CM

## 2022-02-18 DIAGNOSIS — I10 ESSENTIAL HYPERTENSION, BENIGN: ICD-10-CM

## 2022-02-18 DIAGNOSIS — G89.29 CHRONIC BILATERAL LOW BACK PAIN WITHOUT SCIATICA: ICD-10-CM

## 2022-02-18 DIAGNOSIS — E11.9 TYPE 2 DIABETES MELLITUS WITHOUT COMPLICATION, WITHOUT LONG-TERM CURRENT USE OF INSULIN (HCC): Primary | ICD-10-CM

## 2022-02-18 LAB — HBA1C MFR BLD HPLC: 5.9 %

## 2022-02-18 PROCEDURE — 83036 HEMOGLOBIN GLYCOSYLATED A1C: CPT | Performed by: FAMILY MEDICINE

## 2022-02-18 PROCEDURE — 99214 OFFICE O/P EST MOD 30 MIN: CPT | Performed by: FAMILY MEDICINE

## 2022-02-18 PROCEDURE — G0463 HOSPITAL OUTPT CLINIC VISIT: HCPCS | Performed by: FAMILY MEDICINE

## 2022-02-18 RX ORDER — METHOCARBAMOL 500 MG/1
500 TABLET, FILM COATED ORAL
Qty: 30 TABLET | Refills: 1 | Status: SHIPPED | OUTPATIENT
Start: 2022-02-18 | End: 2022-09-21 | Stop reason: ALTCHOICE

## 2022-02-18 NOTE — PROGRESS NOTES
HISTORY OF PRESENT ILLNESS  Alonso Hein is a 73 y.o. male. f/u coiling aneurysm,hbp,chol,ar,ed. Feeling well. Has  1 week nonradiating lumbago,no apparent injury  Diabetes  The history is provided by the patient. This is a new problem. The problem occurs daily. The problem has not changed since onset. Pertinent negatives include no chest pain and no headaches. Hypertension   The history is provided by the patient. This is a chronic problem. The problem has been gradually worsening. Pertinent negatives include no chest pain, no orthopnea, no palpitations, no malaise/fatigue, no blurred vision, no headaches, no peripheral edema and no dizziness. Cholesterol Problem  The history is provided by the patient. This is a chronic problem. The problem occurs daily. The problem has not changed since onset. Pertinent negatives include no chest pain and no headaches. Back Pain   The history is provided by the patient. This is a new problem. The current episode started more than 1 week ago. The problem has not changed since onset. The pain is at a severity of 4/10. The symptoms are aggravated by twisting and bending. The pain is worse during the day. Pertinent negatives include no chest pain, no headaches, no paresthesias and no paresis. Review of Systems   Constitutional: Negative for malaise/fatigue. HENT: Negative for hearing loss. Eyes: Negative for blurred vision. Respiratory: Negative for cough, hemoptysis and sputum production. Cardiovascular: Negative for chest pain, palpitations and orthopnea. Gastrointestinal: Negative for blood in stool and constipation. Genitourinary: Negative for frequency. Musculoskeletal: Positive for back pain. Negative for myalgias. Neurological: Negative for dizziness, headaches and paresthesias. Psychiatric/Behavioral: Negative for depression. Physical Exam  Constitutional:       Appearance: Normal appearance. He is obese. HENT:      Head: Normocephalic. Right Ear: Tympanic membrane normal.      Left Ear: Tympanic membrane normal.      Nose: Nose normal.   Cardiovascular:      Rate and Rhythm: Normal rate and regular rhythm. Pulses: Normal pulses. Heart sounds: Normal heart sounds. Pulmonary:      Effort: Pulmonary effort is normal.      Breath sounds: Normal breath sounds. Abdominal:      Palpations: Abdomen is soft. Musculoskeletal:      Right hand: Normal.      Left hand: Normal. No tenderness or bony tenderness. Hands:       Cervical back: Normal range of motion and neck supple. Lumbar back: Tenderness present. Decreased range of motion. Negative right straight leg raise test and negative left straight leg raise test.        Back:       Comments: SLRs 90/90,DTRs normal and symmetrical     Skin:     General: Skin is warm and dry. Neurological:      Mental Status: He is alert and oriented to person, place, and time. Mental status is at baseline. Psychiatric:         Mood and Affect: Mood normal.         Diagnoses and all orders for this visit:    1. Type 2 diabetes mellitus without complication, without long-term current use of insulin (HCC)  -     AMB POC HEMOGLOBIN A1C    2. Severe obesity (BMI 35.0-39. 9) with comorbidity ([address]    3. Essential hypertension, benign  -     METABOLIC PANEL, COMPREHENSIVE; Future    4. Mixed hyperlipidemia  -     LIPID PANEL; Future    5. Chronic bilateral low back pain without sciatica,recomend heat light activites,robaxin    Other orders  -     methocarbamoL (ROBAXIN) 500 mg tablet; Take 1 Tablet by mouth four (4) times daily as needed for Muscle Spasm(s).

## 2022-02-18 NOTE — PROGRESS NOTES
Chief Complaint   Patient presents with    Back Pain     Pt  state he has back pain x 3 weeks. Stopped Metformin 2 days ago and pain has eased up. 1. Have you been to the ER, urgent care clinic since your last visit? Hospitalized since your last visit? No    2. Have you seen or consulted any other health care providers outside of the [address] [place] [address] since your last visit? Include any pap smears or colon screening.  No

## 2022-02-19 LAB
ALBUMIN SERPL-MCNC: 4 G/DL (ref 3.5–5)
ALBUMIN/GLOB SERPL: 1.3 {RATIO} (ref 1.1–2.2)
ALP SERPL-CCNC: 95 U/L (ref 45–117)
ALT SERPL-CCNC: 34 U/L (ref 12–78)
ANION GAP SERPL CALC-SCNC: 5 MMOL/L (ref 5–15)
AST SERPL-CCNC: 19 U/L (ref 15–37)
BILIRUB SERPL-MCNC: 0.9 MG/DL (ref 0.2–1)
BUN SERPL-MCNC: 12 MG/DL (ref 6–20)
BUN/CREAT SERPL: 13 (ref 12–20)
CALCIUM SERPL-MCNC: 9.8 MG/DL (ref 8.5–10.1)
CHLORIDE SERPL-SCNC: 105 MMOL/L (ref 97–108)
CHOLEST SERPL-MCNC: 144 MG/DL
CO2 SERPL-SCNC: 29 MMOL/L (ref 21–32)
CREAT SERPL-MCNC: 0.9 MG/DL (ref 0.7–1.3)
GLOBULIN SER CALC-MCNC: 3.2 G/DL (ref 2–4)
GLUCOSE SERPL-MCNC: 95 MG/DL (ref 65–100)
HDLC SERPL-MCNC: 48 MG/DL
HDLC SERPL: 3 {RATIO} (ref 0–5)
LDLC SERPL CALC-MCNC: 79 MG/DL (ref 0–100)
POTASSIUM SERPL-SCNC: 3.9 MMOL/L (ref 3.5–5.1)
PROT SERPL-MCNC: 7.2 G/DL (ref 6.4–8.2)
SODIUM SERPL-SCNC: 139 MMOL/L (ref 136–145)
TRIGL SERPL-MCNC: 85 MG/DL (ref ?–150)
VLDLC SERPL CALC-MCNC: 17 MG/DL

## 2022-02-21 DIAGNOSIS — I10 ESSENTIAL HYPERTENSION, BENIGN: ICD-10-CM

## 2022-02-21 DIAGNOSIS — K21.00 GASTROESOPHAGEAL REFLUX DISEASE WITH ESOPHAGITIS WITHOUT HEMORRHAGE: ICD-10-CM

## 2022-02-22 RX ORDER — FAMOTIDINE 40 MG/1
TABLET, FILM COATED ORAL
Qty: 90 TABLET | Refills: 1 | Status: SHIPPED | OUTPATIENT
Start: 2022-02-22 | End: 2022-10-10

## 2022-02-22 RX ORDER — DILTIAZEM HYDROCHLORIDE 300 MG/1
CAPSULE, COATED, EXTENDED RELEASE ORAL
Qty: 90 CAPSULE | Refills: 3 | Status: SHIPPED | OUTPATIENT
Start: 2022-02-22

## 2022-03-18 PROBLEM — E66.01 SEVERE OBESITY (HCC): Status: ACTIVE | Noted: 2018-12-07

## 2022-03-18 PROBLEM — I73.9 PERIPHERAL VASCULAR DISEASE (HCC): Status: ACTIVE | Noted: 2020-07-23

## 2022-03-20 PROBLEM — E11.9 TYPE 2 DIABETES MELLITUS (HCC): Status: ACTIVE | Noted: 2021-07-21

## 2022-03-21 ENCOUNTER — OFFICE VISIT (OUTPATIENT)
Dept: FAMILY MEDICINE CLINIC | Age: 72
End: 2022-03-21
Payer: MEDICARE

## 2022-03-21 VITALS
TEMPERATURE: 98.1 F | HEIGHT: 72 IN | RESPIRATION RATE: 18 BRPM | SYSTOLIC BLOOD PRESSURE: 140 MMHG | OXYGEN SATURATION: 98 % | HEART RATE: 68 BPM | WEIGHT: 265 LBS | BODY MASS INDEX: 35.89 KG/M2 | DIASTOLIC BLOOD PRESSURE: 76 MMHG

## 2022-03-21 DIAGNOSIS — R10.11 RUQ ABDOMINAL PAIN: Primary | ICD-10-CM

## 2022-03-21 LAB
BILIRUB UR QL STRIP: NEGATIVE
GLUCOSE UR-MCNC: NEGATIVE MG/DL
KETONES P FAST UR STRIP-MCNC: NEGATIVE MG/DL
PH UR STRIP: 5.5 [PH] (ref 4.6–8)
PROT UR QL STRIP: NEGATIVE
SP GR UR STRIP: 1.02 (ref 1–1.03)
UA UROBILINOGEN AMB POC: NORMAL (ref 0.2–1)
URINALYSIS CLARITY POC: CLEAR
URINALYSIS COLOR POC: YELLOW
URINE BLOOD POC: NORMAL
URINE LEUKOCYTES POC: NEGATIVE
URINE NITRITES POC: NEGATIVE

## 2022-03-21 PROCEDURE — 3017F COLORECTAL CA SCREEN DOC REV: CPT | Performed by: FAMILY MEDICINE

## 2022-03-21 PROCEDURE — G8427 DOCREV CUR MEDS BY ELIG CLIN: HCPCS | Performed by: FAMILY MEDICINE

## 2022-03-21 PROCEDURE — G8753 SYS BP > OR = 140: HCPCS | Performed by: FAMILY MEDICINE

## 2022-03-21 PROCEDURE — G8754 DIAS BP LESS 90: HCPCS | Performed by: FAMILY MEDICINE

## 2022-03-21 PROCEDURE — G8536 NO DOC ELDER MAL SCRN: HCPCS | Performed by: FAMILY MEDICINE

## 2022-03-21 PROCEDURE — G8510 SCR DEP NEG, NO PLAN REQD: HCPCS | Performed by: FAMILY MEDICINE

## 2022-03-21 PROCEDURE — 81003 URINALYSIS AUTO W/O SCOPE: CPT | Performed by: FAMILY MEDICINE

## 2022-03-21 PROCEDURE — 1101F PT FALLS ASSESS-DOCD LE1/YR: CPT | Performed by: FAMILY MEDICINE

## 2022-03-21 PROCEDURE — 99214 OFFICE O/P EST MOD 30 MIN: CPT | Performed by: FAMILY MEDICINE

## 2022-03-21 PROCEDURE — G8417 CALC BMI ABV UP PARAM F/U: HCPCS | Performed by: FAMILY MEDICINE

## 2022-03-21 RX ORDER — OMEPRAZOLE 20 MG/1
20 CAPSULE, DELAYED RELEASE ORAL
Qty: 60 CAPSULE | Refills: 1 | Status: SHIPPED | OUTPATIENT
Start: 2022-03-21 | End: 2022-03-29 | Stop reason: ALTCHOICE

## 2022-03-21 NOTE — PROGRESS NOTES
HISTORY OF PRESENT ILLNESS  Emily Miller is a 73 y.o. male c/o 1 week of rt sided abdominal and back pain ,no diarrhea,brb,melena or hematuria. Has some loose stol and dyspepsia  Side Pain  The history is provided by the patient. This is a new problem. The current episode started more than 2 days ago. The problem occurs daily. The problem has not changed since onset. Associated symptoms include abdominal pain. Pertinent negatives include no chest pain and no shortness of breath. Review of Systems   Constitutional: Negative for chills and fever. Respiratory: Negative for shortness of breath. Cardiovascular: Negative for chest pain and palpitations. Gastrointestinal: Positive for abdominal pain, diarrhea, heartburn and nausea. Negative for blood in stool, constipation and melena. Physical Exam  Constitutional:       Appearance: Normal appearance. HENT:      Head: Normocephalic and atraumatic. Nose: Nose normal.      Mouth/Throat:      Mouth: Mucous membranes are moist.   Cardiovascular:      Rate and Rhythm: Normal rate and regular rhythm. Pulses: Normal pulses. Heart sounds: Normal heart sounds. Pulmonary:      Effort: Pulmonary effort is normal.      Breath sounds: Normal breath sounds. Abdominal:      General: Abdomen is flat. There is no distension. Palpations: Abdomen is soft. Tenderness: There is abdominal tenderness. There is guarding. There is no right CVA tenderness. Comments: Mild diect r sided abdominal tenderness   Genitourinary:     Rectum: Normal. Guaiac result negative. Neurological:      Mental Status: He is alert. ASSESSMENT and PLAN  Diagnoses and all orders for this visit:    1. RUQ abdominal pain with dyspepsia  -     CBC WITH AUTOMATED DIFF; Future  -     METABOLIC PANEL, COMPREHENSIVE; Future  -     AMB POC URINALYSIS DIP STICK AUTO W/O MICRO    Other orders  -     omeprazole (PRILOSEC) 20 mg capsule;  Take 1 Capsule by mouth Before breakfast and dinner. Follow-up and Dispositions    · Return in about 1 week (around 3/28/2022).

## 2022-03-22 RX ORDER — OMEPRAZOLE 40 MG/1
40 CAPSULE, DELAYED RELEASE ORAL DAILY
Qty: 30 CAPSULE | Refills: 3 | Status: SHIPPED | OUTPATIENT
Start: 2022-03-22 | End: 2022-05-31

## 2022-03-22 NOTE — TELEPHONE ENCOUNTER
Insurance will not cover Omeprazole 20 mg BID, Omeprazole 40 mg loaded for once daily.  Patient's telephone number [phone]

## 2022-03-22 NOTE — TELEPHONE ENCOUNTER
----- Message from Mir Persaud sent at 3/22/2022  2:10 PM EDT -----  Subject: Medication Problem    QUESTIONS  Name of Medication? omeprazole (PRILOSEC) 20 mg capsule  Patient-reported dosage and instructions? two a day  What question or problem do you have with the medication? Pt stated that   his insurance won't pay for a 30 ds and needs a PA or a substitute med   sent over to the pharmacy. Preferred Pharmacy? [id number]- [place], [place] - [address]   [address] [place] phone number (if available)? [phone]  Additional Information for Provider?   ---------------------------------------------------------------------------  --------------  [address]  What is the best way for the office to contact you? OK to leave message on   voicemail  Preferred Call Back Phone Number? [phone]  ---------------------------------------------------------------------------  --------------  SCRIPT ANSWERS  Relationship to Patient?  Self

## 2022-03-23 LAB
ALBUMIN SERPL-MCNC: 4.6 G/DL (ref 3.8–4.8)
ALBUMIN/GLOB SERPL: 1.6 {RATIO} (ref 1.2–2.2)
ALP SERPL-CCNC: 100 IU/L (ref 44–121)
ALT SERPL-CCNC: 34 IU/L (ref 0–44)
AST SERPL-CCNC: 21 IU/L (ref 0–40)
BASOPHILS # BLD AUTO: 0.1 X10E3/UL (ref 0–0.2)
BASOPHILS NFR BLD AUTO: 1 %
BILIRUB SERPL-MCNC: 0.5 MG/DL (ref 0–1.2)
BUN SERPL-MCNC: 9 MG/DL (ref 8–27)
BUN/CREAT SERPL: 9 (ref 10–24)
CALCIUM SERPL-MCNC: 9.4 MG/DL (ref 8.6–10.2)
CHLORIDE SERPL-SCNC: 102 MMOL/L (ref 96–106)
CO2 SERPL-SCNC: 23 MMOL/L (ref 20–29)
CREAT SERPL-MCNC: 1.01 MG/DL (ref 0.76–1.27)
EGFR: 81 ML/MIN/1.73
EOSINOPHIL # BLD AUTO: 0.3 X10E3/UL (ref 0–0.4)
EOSINOPHIL NFR BLD AUTO: 3 %
ERYTHROCYTE [DISTWIDTH] IN BLOOD BY AUTOMATED COUNT: 12.9 % (ref 11.6–15.4)
GLOBULIN SER CALC-MCNC: 2.8 G/DL (ref 1.5–4.5)
GLUCOSE SERPL-MCNC: 135 MG/DL (ref 65–99)
HCT VFR BLD AUTO: 42.6 % (ref 37.5–51)
HGB BLD-MCNC: 13.9 G/DL (ref 13–17.7)
IMM GRANULOCYTES # BLD AUTO: 0 X10E3/UL (ref 0–0.1)
IMM GRANULOCYTES NFR BLD AUTO: 0 %
LYMPHOCYTES # BLD AUTO: 3.3 X10E3/UL (ref 0.7–3.1)
LYMPHOCYTES NFR BLD AUTO: 42 %
MCH RBC QN AUTO: 29.6 PG (ref 26.6–33)
MCHC RBC AUTO-ENTMCNC: 32.6 G/DL (ref 31.5–35.7)
MCV RBC AUTO: 91 FL (ref 79–97)
MONOCYTES # BLD AUTO: 0.7 X10E3/UL (ref 0.1–0.9)
MONOCYTES NFR BLD AUTO: 9 %
NEUTROPHILS # BLD AUTO: 3.5 X10E3/UL (ref 1.4–7)
NEUTROPHILS NFR BLD AUTO: 45 %
PLATELET # BLD AUTO: 294 X10E3/UL (ref 150–450)
POTASSIUM SERPL-SCNC: 4.1 MMOL/L (ref 3.5–5.2)
PROT SERPL-MCNC: 7.4 G/DL (ref 6–8.5)
RBC # BLD AUTO: 4.69 X10E6/UL (ref 4.14–5.8)
SODIUM SERPL-SCNC: 143 MMOL/L (ref 134–144)
WBC # BLD AUTO: 7.8 X10E3/UL (ref 3.4–10.8)

## 2022-03-29 ENCOUNTER — OFFICE VISIT (OUTPATIENT)
Dept: FAMILY MEDICINE CLINIC | Age: 72
End: 2022-03-29
Payer: MEDICARE

## 2022-03-29 VITALS
DIASTOLIC BLOOD PRESSURE: 84 MMHG | SYSTOLIC BLOOD PRESSURE: 136 MMHG | HEIGHT: 72 IN | TEMPERATURE: 98.3 F | RESPIRATION RATE: 18 BRPM | WEIGHT: 264.8 LBS | OXYGEN SATURATION: 98 % | BODY MASS INDEX: 35.87 KG/M2 | HEART RATE: 64 BPM

## 2022-03-29 DIAGNOSIS — K21.00 GASTROESOPHAGEAL REFLUX DISEASE WITH ESOPHAGITIS WITHOUT HEMORRHAGE: ICD-10-CM

## 2022-03-29 DIAGNOSIS — M54.50 CHRONIC BILATERAL LOW BACK PAIN WITHOUT SCIATICA: ICD-10-CM

## 2022-03-29 DIAGNOSIS — R10.11 RUQ ABDOMINAL PAIN: Primary | ICD-10-CM

## 2022-03-29 DIAGNOSIS — I10 ESSENTIAL HYPERTENSION, BENIGN: ICD-10-CM

## 2022-03-29 DIAGNOSIS — G89.29 CHRONIC BILATERAL LOW BACK PAIN WITHOUT SCIATICA: ICD-10-CM

## 2022-03-29 PROCEDURE — G0463 HOSPITAL OUTPT CLINIC VISIT: HCPCS | Performed by: FAMILY MEDICINE

## 2022-03-29 PROCEDURE — G8536 NO DOC ELDER MAL SCRN: HCPCS | Performed by: FAMILY MEDICINE

## 2022-03-29 PROCEDURE — 3017F COLORECTAL CA SCREEN DOC REV: CPT | Performed by: FAMILY MEDICINE

## 2022-03-29 PROCEDURE — G8417 CALC BMI ABV UP PARAM F/U: HCPCS | Performed by: FAMILY MEDICINE

## 2022-03-29 PROCEDURE — 1101F PT FALLS ASSESS-DOCD LE1/YR: CPT | Performed by: FAMILY MEDICINE

## 2022-03-29 PROCEDURE — 99213 OFFICE O/P EST LOW 20 MIN: CPT | Performed by: FAMILY MEDICINE

## 2022-03-29 PROCEDURE — G8752 SYS BP LESS 140: HCPCS | Performed by: FAMILY MEDICINE

## 2022-03-29 PROCEDURE — G8427 DOCREV CUR MEDS BY ELIG CLIN: HCPCS | Performed by: FAMILY MEDICINE

## 2022-03-29 PROCEDURE — G8432 DEP SCR NOT DOC, RNG: HCPCS | Performed by: FAMILY MEDICINE

## 2022-03-29 PROCEDURE — G8754 DIAS BP LESS 90: HCPCS | Performed by: FAMILY MEDICINE

## 2022-03-29 NOTE — PROGRESS NOTES
Chief Complaint   Patient presents with    Abdominal Pain     F/U for abdominal pain that goes around the R side of the back. 1. \"Have you been to the ER, urgent care clinic since your last visit? Hospitalized since your last visit? \" No    2. \"Have you seen or consulted any other health care providers outside of the [address] [place] [address] since your last visit? \" No     3. For patients aged 39-70: Has the patient had a colonoscopy / FIT/ Cologuard? Yes - Care Gap present. Most recent result on file      If the patient is female:    4. For patients aged 41-77: Has the patient had a mammogram within the past 2 years? NA - based on age or sex      11. For patients aged 21-65: Has the patient had a pap smear?  NA - based on age or sex    Health Maintenance Due   Topic Date Due    MICROALBUMIN Q1  Never done    Eye Exam Retinal or Dilated  Never done    Shingrix Vaccine Age 50> (1 of 2) Never done    Foot Exam Q1  09/25/2018

## 2022-03-29 NOTE — PROGRESS NOTES
HISTORY OF PRESENT ILLNESS  Josie Yun is a 73 y.o. male f/u  rt sided abdominal and back pain,improving on omeprazole . No diarrhea,brb,melena or hematuria. Has some loose stol and dyspepsia  Side Pain  The history is provided by the patient. This is a new problem. The current episode started more than 2 days ago. The problem occurs daily. The problem has not changed since onset. Associated symptoms include abdominal pain. Pertinent negatives include no chest pain and no shortness of breath. Abdominal Pain  This is a new problem. The current episode started more than 1 week ago. The problem occurs daily. The problem has been rapidly improving. Associated symptoms include abdominal pain. Pertinent negatives include no chest pain and no shortness of breath. Review of Systems   Constitutional: Negative for chills and fever. Respiratory: Negative for shortness of breath. Cardiovascular: Negative for chest pain and palpitations. Gastrointestinal: Positive for abdominal pain, diarrhea, heartburn and nausea. Negative for blood in stool, constipation and melena. Genitourinary: Negative for dysuria, frequency, hematuria and urgency. Physical Exam  Constitutional:       Appearance: Normal appearance. HENT:      Head: Normocephalic and atraumatic. Nose: Nose normal.      Mouth/Throat:      Mouth: Mucous membranes are moist.   Cardiovascular:      Rate and Rhythm: Normal rate and regular rhythm. Pulses: Normal pulses. Heart sounds: Normal heart sounds. Pulmonary:      Effort: Pulmonary effort is normal.      Breath sounds: Normal breath sounds. Abdominal:      General: Abdomen is flat. There is no distension. Palpations: Abdomen is soft. Tenderness: There is abdominal tenderness. There is no right CVA tenderness or guarding. Comments: Mild diect r sided abdominal tenderness   Genitourinary:     Rectum: Normal. Guaiac result negative.    Neurological:      Mental Status: He is alert. Diagnoses and all orders for this visit:    1. RUQ abdominal pain,improving,may need CT    2. Gastroesophageal reflux disease with esophagitis without hemorrhage    3. Essential hypertension, benign    4. Chronic bilateral low back pain without sciatica      Follow-up and Dispositions    · Return in about 4 weeks (around 4/26/2022).

## 2022-04-02 NOTE — ED NOTES
Ambulated patient. Patient states he feels a lot less lightheaded then before. Patient states there is still a tad but much better. Patient resting on stretcher.  Call bell in reach Neuro

## 2022-05-13 NOTE — PROGRESS NOTES
HISTORY OF PRESENT ILLNESS  Camille Arias is a 74 y.o. male. f/u hbp,dizziness leg edema,dizziness. Has harrison e l elbow pain  Hypertension    The history is provided by the patient. This is a chronic problem. The problem has not changed since onset. Associated symptoms include dizziness. Pertinent negatives include no chest pain, no orthopnea, no malaise/fatigue, no peripheral edema and no shortness of breath. Dizziness    This is a recurrent problem. The problem occurs daily. The problem has not changed since onset. The problem is associated with normal activity. Associated symptoms include dizziness. Pertinent negatives include no chest pain, no fever, no malaise/fatigue, no back pain, no focal weakness, no seizures and no slurred speech. Leg Swelling   This is a chronic,minor problem. The problem occurs daily. The problem has been gradually improving. Pertinent negatives include no chest pain and no shortness of breath. Review of Systems   Constitutional: Negative for fever and malaise/fatigue. Respiratory: Negative for shortness of breath. Cardiovascular: Negative for chest pain, orthopnea and claudication. Genitourinary: Negative for frequency. Musculoskeletal: Negative for back pain. Neurological: Positive for dizziness. Negative for focal weakness and seizures. Physical Exam   Constitutional: He is oriented to person, place, and time. He appears well-developed and well-nourished. HENT:   Head: Normocephalic and atraumatic. Right Ear: External ear normal.   Left Ear: External ear normal.   Nose: Nose normal.   Mouth/Throat: Oropharynx is clear and moist.   Cardiovascular: Normal rate and regular rhythm. Pulmonary/Chest: Effort normal and breath sounds normal.   Abdominal: Soft. Neurological: He is alert and oriented to person, place, and time. Skin: Skin is warm and dry. Diagnoses and all orders for this visit:    1.  Essential hypertension, benign  -     METABOLIC PANEL, Of multiple areas. None overtly infected. Try to avoid scratching. May want to try vinegar. COMPREHENSIVE  -     CBC WITH AUTOMATED DIFF  -     hydroCHLOROthiazide (HYDRODIURIL) 12.5 mg tablet; Take 1 Tab by mouth daily. 2. Non-seasonal allergic rhinitis, unspecified chronicity, unspecified trigger    3. Dizziness    4. Encounter for immunization  -     Pneumococcal Conjugate vaccine - 13 valent (50 years and older)      Follow-up Disposition:  Return in about 3 months (around 4/15/2018). Continue current meds and treatments. Follow-up Disposition:  Return in about 3 months (around 4/15/2018).

## 2022-05-31 DIAGNOSIS — E78.2 MIXED HYPERLIPIDEMIA: ICD-10-CM

## 2022-05-31 RX ORDER — ATORVASTATIN CALCIUM 10 MG/1
TABLET, FILM COATED ORAL
Qty: 90 TABLET | Refills: 3 | Status: SHIPPED | OUTPATIENT
Start: 2022-05-31

## 2022-05-31 RX ORDER — OMEPRAZOLE 40 MG/1
CAPSULE, DELAYED RELEASE ORAL
Qty: 90 CAPSULE | Refills: 1 | Status: SHIPPED | OUTPATIENT
Start: 2022-05-31 | End: 2022-07-11

## 2022-06-17 ENCOUNTER — OFFICE VISIT (OUTPATIENT)
Dept: FAMILY MEDICINE CLINIC | Age: 72
End: 2022-06-17
Payer: MEDICARE

## 2022-06-17 VITALS
DIASTOLIC BLOOD PRESSURE: 82 MMHG | WEIGHT: 268.2 LBS | OXYGEN SATURATION: 97 % | BODY MASS INDEX: 36.33 KG/M2 | TEMPERATURE: 98.4 F | HEART RATE: 70 BPM | HEIGHT: 72 IN | SYSTOLIC BLOOD PRESSURE: 142 MMHG | RESPIRATION RATE: 18 BRPM

## 2022-06-17 DIAGNOSIS — E11.9 TYPE 2 DIABETES MELLITUS WITHOUT COMPLICATION, WITHOUT LONG-TERM CURRENT USE OF INSULIN (HCC): ICD-10-CM

## 2022-06-17 DIAGNOSIS — I10 ESSENTIAL HYPERTENSION, BENIGN: Primary | ICD-10-CM

## 2022-06-17 DIAGNOSIS — G89.29 CHRONIC BILATERAL LOW BACK PAIN WITHOUT SCIATICA: ICD-10-CM

## 2022-06-17 DIAGNOSIS — I67.1 CEREBRAL ANEURYSM: ICD-10-CM

## 2022-06-17 DIAGNOSIS — E78.2 MIXED HYPERLIPIDEMIA: ICD-10-CM

## 2022-06-17 DIAGNOSIS — M65.30 TRIGGER FINGER OF LEFT HAND, UNSPECIFIED FINGER: ICD-10-CM

## 2022-06-17 DIAGNOSIS — M54.50 CHRONIC BILATERAL LOW BACK PAIN WITHOUT SCIATICA: ICD-10-CM

## 2022-06-17 DIAGNOSIS — K21.00 GASTROESOPHAGEAL REFLUX DISEASE WITH ESOPHAGITIS WITHOUT HEMORRHAGE: ICD-10-CM

## 2022-06-17 LAB
ALBUMIN SERPL-MCNC: 3.8 G/DL (ref 3.5–5)
ALBUMIN/GLOB SERPL: 1.2 {RATIO} (ref 1.1–2.2)
ALP SERPL-CCNC: 104 U/L (ref 45–117)
ALT SERPL-CCNC: 30 U/L (ref 12–78)
ANION GAP SERPL CALC-SCNC: 5 MMOL/L (ref 5–15)
AST SERPL-CCNC: 20 U/L (ref 15–37)
BASOPHILS # BLD: 0.1 K/UL (ref 0–0.1)
BASOPHILS NFR BLD: 1 % (ref 0–1)
BILIRUB SERPL-MCNC: 0.9 MG/DL (ref 0.2–1)
BUN SERPL-MCNC: 14 MG/DL (ref 6–20)
BUN/CREAT SERPL: 14 (ref 12–20)
CALCIUM SERPL-MCNC: 9.2 MG/DL (ref 8.5–10.1)
CHLORIDE SERPL-SCNC: 105 MMOL/L (ref 97–108)
CHOLEST SERPL-MCNC: 150 MG/DL
CO2 SERPL-SCNC: 30 MMOL/L (ref 21–32)
CREAT SERPL-MCNC: 1.01 MG/DL (ref 0.7–1.3)
DIFFERENTIAL METHOD BLD: NORMAL
EOSINOPHIL # BLD: 0.3 K/UL (ref 0–0.4)
EOSINOPHIL NFR BLD: 4 % (ref 0–7)
ERYTHROCYTE [DISTWIDTH] IN BLOOD BY AUTOMATED COUNT: 13.4 % (ref 11.5–14.5)
GLOBULIN SER CALC-MCNC: 3.2 G/DL (ref 2–4)
GLUCOSE SERPL-MCNC: 104 MG/DL (ref 65–100)
HBA1C MFR BLD HPLC: 7 %
HCT VFR BLD AUTO: 41.5 % (ref 36.6–50.3)
HGB BLD-MCNC: 13.8 G/DL (ref 12.1–17)
IMM GRANULOCYTES # BLD AUTO: 0 K/UL (ref 0–0.04)
IMM GRANULOCYTES NFR BLD AUTO: 0 % (ref 0–0.5)
LYMPHOCYTES # BLD: 2.8 K/UL (ref 0.8–3.5)
LYMPHOCYTES NFR BLD: 35 % (ref 12–49)
MCH RBC QN AUTO: 29.6 PG (ref 26–34)
MCHC RBC AUTO-ENTMCNC: 33.3 G/DL (ref 30–36.5)
MCV RBC AUTO: 89.1 FL (ref 80–99)
MONOCYTES # BLD: 0.8 K/UL (ref 0–1)
MONOCYTES NFR BLD: 10 % (ref 5–13)
NEUTS SEG # BLD: 4.1 K/UL (ref 1.8–8)
NEUTS SEG NFR BLD: 50 % (ref 32–75)
NRBC # BLD: 0 K/UL (ref 0–0.01)
NRBC BLD-RTO: 0 PER 100 WBC
PLATELET # BLD AUTO: 283 K/UL (ref 150–400)
PMV BLD AUTO: 10.1 FL (ref 8.9–12.9)
POTASSIUM SERPL-SCNC: 3.9 MMOL/L (ref 3.5–5.1)
PROT SERPL-MCNC: 7 G/DL (ref 6.4–8.2)
RBC # BLD AUTO: 4.66 M/UL (ref 4.1–5.7)
SODIUM SERPL-SCNC: 140 MMOL/L (ref 136–145)
WBC # BLD AUTO: 8.1 K/UL (ref 4.1–11.1)

## 2022-06-17 PROCEDURE — 83036 HEMOGLOBIN GLYCOSYLATED A1C: CPT | Performed by: FAMILY MEDICINE

## 2022-06-17 PROCEDURE — 2022F DILAT RTA XM EVC RTNOPTHY: CPT | Performed by: FAMILY MEDICINE

## 2022-06-17 PROCEDURE — G8432 DEP SCR NOT DOC, RNG: HCPCS | Performed by: FAMILY MEDICINE

## 2022-06-17 PROCEDURE — G8536 NO DOC ELDER MAL SCRN: HCPCS | Performed by: FAMILY MEDICINE

## 2022-06-17 PROCEDURE — G8754 DIAS BP LESS 90: HCPCS | Performed by: FAMILY MEDICINE

## 2022-06-17 PROCEDURE — G8753 SYS BP > OR = 140: HCPCS | Performed by: FAMILY MEDICINE

## 2022-06-17 PROCEDURE — G8427 DOCREV CUR MEDS BY ELIG CLIN: HCPCS | Performed by: FAMILY MEDICINE

## 2022-06-17 PROCEDURE — 3017F COLORECTAL CA SCREEN DOC REV: CPT | Performed by: FAMILY MEDICINE

## 2022-06-17 PROCEDURE — 1123F ACP DISCUSS/DSCN MKR DOCD: CPT | Performed by: FAMILY MEDICINE

## 2022-06-17 PROCEDURE — G0463 HOSPITAL OUTPT CLINIC VISIT: HCPCS | Performed by: FAMILY MEDICINE

## 2022-06-17 PROCEDURE — 99214 OFFICE O/P EST MOD 30 MIN: CPT | Performed by: FAMILY MEDICINE

## 2022-06-17 PROCEDURE — 1101F PT FALLS ASSESS-DOCD LE1/YR: CPT | Performed by: FAMILY MEDICINE

## 2022-06-17 PROCEDURE — G8417 CALC BMI ABV UP PARAM F/U: HCPCS | Performed by: FAMILY MEDICINE

## 2022-06-17 PROCEDURE — 3051F HG A1C>EQUAL 7.0%<8.0%: CPT | Performed by: FAMILY MEDICINE

## 2022-06-17 NOTE — PROGRESS NOTES
Chief Complaint   Patient presents with    Diabetes     F/U on diabetes.  Hypertension     F/U on BP.  Cholesterol Problem     F/U on cholesterol. 1. \"Have you been to the ER, urgent care clinic since your last visit? Hospitalized since your last visit? \" No    2. \"Have you seen or consulted any other health care providers outside of the [address] [place] [address] since your last visit? \" No     3. For patients aged 39-70: Has the patient had a colonoscopy / FIT/ Cologuard? Yes - Care Gap present. Most recent result on file      If the patient is female:    4. For patients aged 41-77: Has the patient had a mammogram within the past 2 years? NA - based on age or sex      11. For patients aged 21-65: Has the patient had a pap smear?  NA - based on age or sex    Health Maintenance Due   Topic Date Due    MICROALBUMIN Q1  Never done    Eye Exam Retinal or Dilated  Never done    Shingrix Vaccine Age 50> (1 of 2) Never done    Foot Exam Q1  09/25/2018

## 2022-06-17 NOTE — PROGRESS NOTES
HISTORY OF PRESENT ILLNESS  Ewa Brink is a 73 y.o. male. f/u  aneurysm,hbp,chol,ar. Feeling well. Diabetes  The history is provided by the patient. This is a new problem. The problem occurs daily. The problem has not changed since onset. Hypertension   The history is provided by the patient. This is a chronic problem. The problem has been gradually worsening. Pertinent negatives include no orthopnea, no palpitations, no malaise/fatigue, no blurred vision, no peripheral edema and no dizziness. Cholesterol Problem  The history is provided by the patient. This is a chronic problem. The problem occurs daily. The problem has not changed since onset. Review of Systems   Constitutional: Negative for malaise/fatigue. HENT: Negative for hearing loss. Eyes: Negative for blurred vision. Respiratory: Negative for cough, hemoptysis and sputum production. Cardiovascular: Negative for palpitations and orthopnea. Gastrointestinal: Negative for blood in stool, constipation and heartburn. Genitourinary: Negative for frequency. Musculoskeletal: Negative for myalgias. Neurological: Negative for dizziness. Psychiatric/Behavioral: Negative for depression. Physical Exam  Constitutional:       Appearance: Normal appearance. He is obese. HENT:      Head: Normocephalic. Right Ear: Tympanic membrane normal.      Left Ear: Tympanic membrane normal.      Nose: Nose normal.   Cardiovascular:      Rate and Rhythm: Normal rate and regular rhythm. Pulses: Normal pulses. Heart sounds: Normal heart sounds. Pulmonary:      Effort: Pulmonary effort is normal.      Breath sounds: Normal breath sounds. Abdominal:      Palpations: Abdomen is soft. Musculoskeletal:      Right hand: Normal.      Left hand: Normal. No tenderness or bony tenderness. Hands:       Cervical back: Normal range of motion and neck supple. Lumbar back: Tenderness present. Decreased range of motion.  Negative right straight leg raise test and negative left straight leg raise test.        Back:       Comments: SLRs 90/90,DTRs normal and symmetrical     Skin:     General: Skin is warm and dry. Neurological:      Mental Status: He is alert and oriented to person, place, and time. Mental status is at baseline. Psychiatric:         Mood and Affect: Mood normal.         Diagnoses and all orders for this visit:    1. Type 2 diabetes mellitus without complication, without long-term current use of insulin (HCC)  -     AMB POC HEMOGLOBIN A1C    2. Severe obesity (BMI 35.0-39. 9) with comorbidity ([address]    3. Essential hypertension, benign  -     METABOLIC PANEL, COMPREHENSIVE; Future    4. Mixed hyperlipidemia  -     LIPID PANEL; Future    5. Chronic bilateral low back pain without sciatica,recomend heat light activites,robaxin    Other orders  -     methocarbamoL (ROBAXIN) 500 mg tablet; Take 1 Tablet by mouth four (4) times daily as needed for Muscle Spasm(s). Follow-up and Dispositions    · Return in about 3 months (around 9/17/2022).

## 2022-07-11 RX ORDER — OMEPRAZOLE 40 MG/1
CAPSULE, DELAYED RELEASE ORAL
Qty: 90 CAPSULE | Refills: 1 | Status: SHIPPED | OUTPATIENT
Start: 2022-07-11 | End: 2022-09-21 | Stop reason: ALTCHOICE

## 2022-09-21 ENCOUNTER — OFFICE VISIT (OUTPATIENT)
Dept: FAMILY MEDICINE CLINIC | Age: 72
End: 2022-09-21
Payer: MEDICARE

## 2022-09-21 VITALS
TEMPERATURE: 98.7 F | HEIGHT: 72 IN | WEIGHT: 270.2 LBS | SYSTOLIC BLOOD PRESSURE: 132 MMHG | HEART RATE: 63 BPM | BODY MASS INDEX: 36.6 KG/M2 | DIASTOLIC BLOOD PRESSURE: 88 MMHG | OXYGEN SATURATION: 97 %

## 2022-09-21 DIAGNOSIS — E11.9 TYPE 2 DIABETES MELLITUS WITHOUT COMPLICATION, WITHOUT LONG-TERM CURRENT USE OF INSULIN (HCC): ICD-10-CM

## 2022-09-21 DIAGNOSIS — E78.2 MIXED HYPERLIPIDEMIA: ICD-10-CM

## 2022-09-21 DIAGNOSIS — I10 ESSENTIAL HYPERTENSION, BENIGN: ICD-10-CM

## 2022-09-21 DIAGNOSIS — Z00.00 MEDICARE ANNUAL WELLNESS VISIT, SUBSEQUENT: Primary | ICD-10-CM

## 2022-09-21 LAB — HBA1C MFR BLD HPLC: 6.7 %

## 2022-09-21 PROCEDURE — G8536 NO DOC ELDER MAL SCRN: HCPCS | Performed by: FAMILY MEDICINE

## 2022-09-21 PROCEDURE — 3044F HG A1C LEVEL LT 7.0%: CPT | Performed by: FAMILY MEDICINE

## 2022-09-21 PROCEDURE — G0463 HOSPITAL OUTPT CLINIC VISIT: HCPCS | Performed by: FAMILY MEDICINE

## 2022-09-21 PROCEDURE — G8510 SCR DEP NEG, NO PLAN REQD: HCPCS | Performed by: FAMILY MEDICINE

## 2022-09-21 PROCEDURE — 99213 OFFICE O/P EST LOW 20 MIN: CPT | Performed by: FAMILY MEDICINE

## 2022-09-21 PROCEDURE — 1123F ACP DISCUSS/DSCN MKR DOCD: CPT | Performed by: FAMILY MEDICINE

## 2022-09-21 PROCEDURE — G8417 CALC BMI ABV UP PARAM F/U: HCPCS | Performed by: FAMILY MEDICINE

## 2022-09-21 PROCEDURE — 83036 HEMOGLOBIN GLYCOSYLATED A1C: CPT | Performed by: FAMILY MEDICINE

## 2022-09-21 PROCEDURE — 1101F PT FALLS ASSESS-DOCD LE1/YR: CPT | Performed by: FAMILY MEDICINE

## 2022-09-21 PROCEDURE — 3017F COLORECTAL CA SCREEN DOC REV: CPT | Performed by: FAMILY MEDICINE

## 2022-09-21 PROCEDURE — G8752 SYS BP LESS 140: HCPCS | Performed by: FAMILY MEDICINE

## 2022-09-21 PROCEDURE — G8427 DOCREV CUR MEDS BY ELIG CLIN: HCPCS | Performed by: FAMILY MEDICINE

## 2022-09-21 PROCEDURE — 2022F DILAT RTA XM EVC RTNOPTHY: CPT | Performed by: FAMILY MEDICINE

## 2022-09-21 PROCEDURE — G0439 PPPS, SUBSEQ VISIT: HCPCS | Performed by: FAMILY MEDICINE

## 2022-09-21 PROCEDURE — G8754 DIAS BP LESS 90: HCPCS | Performed by: FAMILY MEDICINE

## 2022-09-21 NOTE — PROGRESS NOTES
This is the Subsequent Medicare Annual Wellness Exam, performed 12 months or more after the Initial AWV or the last Subsequent AWV    I have reviewed the patient's medical history in detail and updated the computerized patient record. Assessment/Plan   Education and counseling provided:  Are appropriate based on today's review and evaluation    1. Medicare annual wellness visit, subsequent  2. Essential hypertension, benign  3. Type 2 diabetes mellitus without complication, without long-term current use of insulin ([facility] [address]  4. Mixed hyperlipidemia       Depression Risk Factor Screening     3 most recent PHQ Screens 2022   Little interest or pleasure in doing things Not at all   Feeling down, depressed, irritable, or hopeless Not at all   Total Score PHQ 2 0       Alcohol & Drug Abuse Risk Screen    Do you average more than 1 drink per night or more than 7 drinks a week: No    In the past three months have you have had more than 4 drinks containing alcohol on one occasion: No          Functional Ability and Level of Safety    Hearing: Hearing is good. Activities of Daily Living: The home contains: handrails  Patient does total self care      Ambulation: with no difficulty     Fall Risk:  Fall Risk Assessment, last 12 mths 2022   Able to walk? Yes   Fall in past 12 months? 0   Do you feel unsteady?  0   Are you worried about falling 0      Abuse Screen:  Patient is not abused       Cognitive Screening    Has your family/caregiver stated any concerns about your memory: no     Cognitive Screenin    Health Maintenance Due     Health Maintenance Due   Topic Date Due    MICROALBUMIN Q1  Never done    Eye Exam Retinal or Dilated  Never done    Shingrix Vaccine Age 50> (1 of 2) Never done    Foot Exam Q1  2018    COVID-19 Vaccine (4 - Booster for [facility] series) 2022    Medicare Yearly Exam  2022    Flu Vaccine (1) 2022       Patient Care Team   Patient Care Team:  Onelia Lozoya Mary Ann Sher MD as PCP - General  Abrahma Esparza MD as PCP - [facility] Provider  Ary Hoang MD as Physician (Ophthalmology)    History     Patient Active Problem List   Diagnosis Code    Essential hypertension, benign I10    Glaucoma H40.9    Asthma J45.909    Allergic rhinitis J30.9    OA (osteoarthritis) M19.90    Encounter for long-term (current) use of other medications Z79.899    Mixed hyperlipidemia E78.2    Nocturia R35.1    Cervical radiculitis M54.12    GERD (gastroesophageal reflux disease) K21.9    Chronic maxillary sinusitis J32.0    Severe obesity ([address] E66.01    Peripheral vascular disease ([facility] [address] I73.9    Type 2 diabetes mellitus E11.9     Past Medical History:   Diagnosis Date    Allergic rhinitis, cause unspecified 11/4/2010    Asthma 11/4/2010    Cervical radiculitis 11/9/2012    Encounter for long-term (current) use of other medications 11/26/2010    Essential hypertension, benign 11/4/2010    Glaucoma 11/4/2010    Hypertension     Mixed hyperlipidemia 5/27/2011    Nocturia 11/9/2012    OA (osteoarthritis) 11/26/2010    Type 2 diabetes mellitus 7/21/2021      Past Surgical History:   Procedure Laterality Date    HX COLONOSCOPY      AL NASAL SCOPY,OPEN MAXILL SINUS       Current Outpatient Medications   Medication Sig Dispense Refill    elderberry fruit (ELDERBERRY PO) Take  by mouth. atorvastatin (LIPITOR) 10 mg tablet TAKE 1 TABLET BY MOUTH EVERY DAY 90 Tablet 3    famotidine (PEPCID) 40 mg tablet TAKE 1 TABLET BY MOUTH EVERY DAY 90 Tablet 1    dilTIAZem ER (CARDIZEM CD) 300 mg capsule TAKE 1 CAPSULE BY MOUTH EVERY DAY 90 Capsule 3    ramipriL (ALTACE) 10 mg capsule TAKE 2 CAPSULES BY MOUTH EVERY  Capsule 3    guaiFENesin-dextromethorphan SR (Mucinex DM) 600-30 mg per tablet Take 1 Tablet by mouth two (2) times a day. 20 Tablet 3    bisoprolol-hydroCHLOROthiazide (ZIAC) 2.5-6.25 mg per tablet Take 1 Tablet by mouth daily.  30 Tablet 11    ibuprofen (MOTRIN) 800 mg tablet TAKE 1 TABLET BY MOUTH EVERY 8 HOURS AS NEEDED FOR PAIN 50 Tablet 6    aspirin (ASPIRIN) 325 mg tablet Take 325 mg by mouth two (2) times a day. albuterol (ProAir HFA) 90 mcg/actuation inhaler Take 2 Puffs by inhalation every six (6) hours as needed for Wheezing. 1 Inhaler 11    brimonidine-timoloL (COMBIGAN) 0.2-0.5 % drop ophthalmic solution Administer 1 Drop to both eyes three (3) times daily. EPINEPHrine (EPIPEN) 0.3 mg/0.3 mL injection INJECT INTRAMUSCULARLY ONCE. MAY REPEAT IF NECESSARY  0    montelukast (SINGULAIR) 10 mg tablet Take 10 mg by mouth daily. triamcinolone acetonide (KENALOG) 0.1 % topical cream Apply  to affected area three (3) times daily as needed for Skin Irritation. 85 g 2    fexofenadine (ALLEGRA) 180 mg tablet Take  by mouth.      multivitamin (ONE A DAY) tablet Take 1 Tab by mouth daily. SALINE NASAL 0.65 % nasal spray INHALE 2 SPRAYS IN EACH NOSTRIL 4 TIMES A DAY FOR 14 DAYS  2    LUMIGAN 0.01 % ophthalmic drops Administer 1 Drop to both eyes nightly. 11    loteprednol etabonate (ALREX) 0.2 % drps Administer 1 Drop to both eyes four (4) times daily as needed. Allergies   Allergen Reactions    Latex, Natural Rubber Itching    Diclofenac Other (comments)     Ringing in ears    Propoxyphene-Acetaminophen Rash    Sulfa (Sulfonamide Antibiotics) Shortness of Breath     Pt states he is not allergic.     Sulindac Diarrhea    Zithromax [Azithromycin] Rash       Family History   Problem Relation Age of Onset    Heart Disease Mother     Hypertension Mother     Heart Disease Father     No Known Problems Brother     Cancer Brother      Social History     Tobacco Use    Smoking status: Never    Smokeless tobacco: Never   Substance Use Topics    Alcohol use: No         Duarte Arellano MD

## 2022-09-21 NOTE — PROGRESS NOTES
Patient identified with two identification factors, Name and Date of Birth. Chief Complaint   Patient presents with    Annual Wellness Visit       Visit Vitals  BP (!) 165/89 (BP 1 Location: Right arm, BP Patient Position: Sitting, BP Cuff Size: Adult)   Pulse 63   Temp 98.7 °F (37.1 °C) (Oral)   Ht 6' (1.829 m)   Wt 270 lb 3.2 oz (122.6 kg)   SpO2 97%   BMI 36.65 kg/m²       Health Maintenance Due   Topic    MICROALBUMIN Q1     Eye Exam Retinal or Dilated     Shingrix Vaccine Age 50> (1 of 2)    Foot Exam Q1     COVID-19 Vaccine (4 - Booster for [facility] series)    Medicare Yearly Exam     Flu Vaccine (1)        1. \"Have you been to the ER, urgent care clinic since your last visit? Hospitalized since your last visit? \" No    2. \"Have you seen or consulted any other health care providers outside of the [address] [place] [address] since your last visit? \" No     3. For patients aged 39-70: Has the patient had a colonoscopy / FIT/ Cologuard?  Yes - no Care Gap present

## 2022-09-21 NOTE — PROGRESS NOTES
HISTORY OF PRESENT ILLNESS  Rosana Conklin is a 73 y.o. male. f/u  aneurysm,hbp,chol,ar,igt. Feeling well. Diabetes  The history is provided by the Patient. This is a new problem. The problem occurs daily. The problem has not changed since onset. Hypertension   The history is provided by the Patient. This is a chronic problem. The problem has been gradually worsening. Pertinent negatives include no orthopnea, no palpitations, no malaise/fatigue, no blurred vision, no peripheral edema and no dizziness. Cholesterol Problem  The history is provided by the Patient. This is a chronic problem. The problem occurs daily. The problem has not changed since onset. Review of Systems   Constitutional:  Negative for malaise/fatigue. HENT:  Negative for hearing loss. Eyes:  Negative for blurred vision. Respiratory:  Negative for cough, hemoptysis and sputum production. Cardiovascular:  Negative for palpitations and orthopnea. Gastrointestinal:  Negative for blood in stool, constipation and heartburn. Genitourinary:  Negative for dysuria, frequency and urgency. Musculoskeletal:  Negative for joint pain and myalgias. Neurological:  Negative for dizziness. Psychiatric/Behavioral:  Negative for depression. Physical Exam  Constitutional:       Appearance: Normal appearance. He is obese. HENT:      Head: Normocephalic. Right Ear: Tympanic membrane normal.      Left Ear: Tympanic membrane normal.      Nose: Nose normal.   Cardiovascular:      Rate and Rhythm: Normal rate and regular rhythm. Pulses: Normal pulses. Heart sounds: Normal heart sounds. Pulmonary:      Effort: Pulmonary effort is normal.      Breath sounds: Normal breath sounds. Abdominal:      Palpations: Abdomen is soft. Musculoskeletal:      Right hand: Normal.      Left hand: Normal. No tenderness or bony tenderness. Hands:       Cervical back: Normal range of motion and neck supple.       Lumbar back: Tenderness present. Decreased range of motion. Negative right straight leg raise test and negative left straight leg raise test.        Back:       Comments: SLRs 90/90,DTRs normal and symmetrical     Skin:     General: Skin is warm and dry. Neurological:      Mental Status: He is alert and oriented to person, place, and time. Mental status is at baseline. Psychiatric:         Mood and Affect: Mood normal.     Diagnoses and all orders for this visit:    1. Medicare annual wellness visit, subsequent    2. Essential hypertension, benign,controlled    3. Type 2 diabetes mellitus without complication, without long-term current use of insulin ([facility])  -     AMB POC HEMOGLOBIN A1C    4.  Mixed hyperlipidemia

## 2022-09-27 ENCOUNTER — OFFICE VISIT (OUTPATIENT)
Dept: FAMILY MEDICINE CLINIC | Age: 72
End: 2022-09-27

## 2022-09-27 DIAGNOSIS — I73.9 PERIPHERAL VASCULAR DISEASE (HCC): ICD-10-CM

## 2022-09-27 DIAGNOSIS — Z23 ENCOUNTER FOR IMMUNIZATION: Primary | ICD-10-CM

## 2022-10-06 DIAGNOSIS — E11.9 TYPE 2 DIABETES MELLITUS WITHOUT COMPLICATION, WITHOUT LONG-TERM CURRENT USE OF INSULIN (HCC): ICD-10-CM

## 2022-10-06 DIAGNOSIS — I10 ESSENTIAL HYPERTENSION, BENIGN: ICD-10-CM

## 2022-10-06 RX ORDER — BISOPROLOL FUMARATE AND HYDROCHLOROTHIAZIDE 2.5; 6.25 MG/1; MG/1
TABLET ORAL
Qty: 90 TABLET | Refills: 3 | Status: SHIPPED | OUTPATIENT
Start: 2022-10-06

## 2022-10-06 RX ORDER — METFORMIN HYDROCHLORIDE 750 MG/1
TABLET, EXTENDED RELEASE ORAL
Qty: 90 TABLET | Refills: 1 | Status: SHIPPED | OUTPATIENT
Start: 2022-10-06

## 2022-10-10 DIAGNOSIS — K21.00 GASTROESOPHAGEAL REFLUX DISEASE WITH ESOPHAGITIS WITHOUT HEMORRHAGE: ICD-10-CM

## 2022-10-10 RX ORDER — FAMOTIDINE 40 MG/1
TABLET, FILM COATED ORAL
Qty: 90 TABLET | Refills: 1 | Status: SHIPPED | OUTPATIENT
Start: 2022-10-10

## 2022-10-16 RX ORDER — RAMIPRIL 10 MG/1
CAPSULE ORAL
Qty: 180 CAPSULE | Refills: 3 | Status: SHIPPED | OUTPATIENT
Start: 2022-10-16

## 2022-10-17 DIAGNOSIS — S33.8XXA SPRAIN OF GROIN, INITIAL ENCOUNTER: ICD-10-CM

## 2022-10-17 DIAGNOSIS — M15.9 PRIMARY OSTEOARTHRITIS INVOLVING MULTIPLE JOINTS: ICD-10-CM

## 2022-10-17 RX ORDER — IBUPROFEN 800 MG/1
800 TABLET ORAL
Qty: 50 TABLET | Refills: 6 | Status: SHIPPED | OUTPATIENT
Start: 2022-10-17

## 2023-01-08 RX ORDER — OMEPRAZOLE 40 MG/1
CAPSULE, DELAYED RELEASE ORAL
Qty: 90 CAPSULE | Refills: 1 | Status: SHIPPED | OUTPATIENT
Start: 2023-01-08

## 2023-01-13 DIAGNOSIS — E11.9 TYPE 2 DIABETES MELLITUS WITHOUT COMPLICATION, WITHOUT LONG-TERM CURRENT USE OF INSULIN (HCC): ICD-10-CM

## 2023-01-13 RX ORDER — METFORMIN HYDROCHLORIDE 750 MG/1
TABLET, EXTENDED RELEASE ORAL
Qty: 90 TABLET | Refills: 1 | Status: SHIPPED | OUTPATIENT
Start: 2023-01-13

## 2023-01-23 ENCOUNTER — OFFICE VISIT (OUTPATIENT)
Dept: FAMILY MEDICINE CLINIC | Age: 73
End: 2023-01-23
Payer: MEDICARE

## 2023-01-23 VITALS
WEIGHT: 266.4 LBS | SYSTOLIC BLOOD PRESSURE: 174 MMHG | BODY MASS INDEX: 36.08 KG/M2 | HEIGHT: 72 IN | OXYGEN SATURATION: 97 % | DIASTOLIC BLOOD PRESSURE: 91 MMHG | HEART RATE: 61 BPM

## 2023-01-23 DIAGNOSIS — E66.01 SEVERE OBESITY (BMI 35.0-39.9) WITH COMORBIDITY (HCC): ICD-10-CM

## 2023-01-23 DIAGNOSIS — G89.29 CHRONIC BILATERAL LOW BACK PAIN WITHOUT SCIATICA: ICD-10-CM

## 2023-01-23 DIAGNOSIS — M54.50 CHRONIC BILATERAL LOW BACK PAIN WITHOUT SCIATICA: ICD-10-CM

## 2023-01-23 DIAGNOSIS — E11.9 TYPE 2 DIABETES MELLITUS WITHOUT COMPLICATION, WITHOUT LONG-TERM CURRENT USE OF INSULIN (HCC): Primary | ICD-10-CM

## 2023-01-23 DIAGNOSIS — E78.2 MIXED HYPERLIPIDEMIA: ICD-10-CM

## 2023-01-23 DIAGNOSIS — I10 ESSENTIAL HYPERTENSION, BENIGN: ICD-10-CM

## 2023-01-23 DIAGNOSIS — I67.1 CEREBRAL ANEURYSM: ICD-10-CM

## 2023-01-23 LAB — HBA1C MFR BLD HPLC: 6.5 %

## 2023-01-23 PROCEDURE — 83036 HEMOGLOBIN GLYCOSYLATED A1C: CPT | Performed by: FAMILY MEDICINE

## 2023-01-23 PROCEDURE — 1123F ACP DISCUSS/DSCN MKR DOCD: CPT | Performed by: FAMILY MEDICINE

## 2023-01-23 PROCEDURE — G8536 NO DOC ELDER MAL SCRN: HCPCS | Performed by: FAMILY MEDICINE

## 2023-01-23 PROCEDURE — 2022F DILAT RTA XM EVC RTNOPTHY: CPT | Performed by: FAMILY MEDICINE

## 2023-01-23 PROCEDURE — G8510 SCR DEP NEG, NO PLAN REQD: HCPCS | Performed by: FAMILY MEDICINE

## 2023-01-23 PROCEDURE — 3077F SYST BP >= 140 MM HG: CPT | Performed by: FAMILY MEDICINE

## 2023-01-23 PROCEDURE — 99213 OFFICE O/P EST LOW 20 MIN: CPT | Performed by: FAMILY MEDICINE

## 2023-01-23 PROCEDURE — G0463 HOSPITAL OUTPT CLINIC VISIT: HCPCS | Performed by: FAMILY MEDICINE

## 2023-01-23 PROCEDURE — 3080F DIAST BP >= 90 MM HG: CPT | Performed by: FAMILY MEDICINE

## 2023-01-23 PROCEDURE — 1101F PT FALLS ASSESS-DOCD LE1/YR: CPT | Performed by: FAMILY MEDICINE

## 2023-01-23 PROCEDURE — 3017F COLORECTAL CA SCREEN DOC REV: CPT | Performed by: FAMILY MEDICINE

## 2023-01-23 PROCEDURE — G8427 DOCREV CUR MEDS BY ELIG CLIN: HCPCS | Performed by: FAMILY MEDICINE

## 2023-01-23 PROCEDURE — 3044F HG A1C LEVEL LT 7.0%: CPT | Performed by: FAMILY MEDICINE

## 2023-01-23 PROCEDURE — G8417 CALC BMI ABV UP PARAM F/U: HCPCS | Performed by: FAMILY MEDICINE

## 2023-01-23 RX ORDER — BISOPROLOL FUMARATE AND HYDROCHLOROTHIAZIDE 5; 6.25 MG/1; MG/1
1 TABLET ORAL DAILY
Qty: 90 TABLET | Refills: 3 | Status: SHIPPED | OUTPATIENT
Start: 2023-01-23

## 2023-01-23 NOTE — PROGRESS NOTES
HISTORY OF PRESENT ILLNESS  Toño Bailey is a 81 y.o. male. f/u  aneurysm,hbp,chol,ar,igt. Now off metformin. Has some painless swelling l distal leg  Diabetes  The history is provided by the Patient. This is a new problem. The problem occurs daily. The problem has been gradually improving. Hypertension   The history is provided by the Patient. This is a chronic problem. The problem has been gradually worsening. Pertinent negatives include no orthopnea, no palpitations, no malaise/fatigue, no blurred vision, no peripheral edema and no dizziness. Cholesterol Problem  The history is provided by the Patient. This is a chronic problem. The problem occurs daily. The problem has not changed since onset. Leg Swelling  The history is provided by the Patient. This is a new problem. The current episode started more than 1 week ago. The problem occurs daily. The problem has not changed since onset. Review of Systems   Constitutional:  Negative for chills, fever and malaise/fatigue. HENT:  Negative for hearing loss. Eyes:  Negative for blurred vision. Respiratory:  Negative for cough, hemoptysis and sputum production. Cardiovascular:  Positive for leg swelling. Negative for palpitations and orthopnea. Gastrointestinal:  Negative for blood in stool, constipation and heartburn. Genitourinary:  Negative for dysuria, frequency and urgency. Musculoskeletal:  Negative for joint pain and myalgias. Neurological:  Negative for dizziness. Psychiatric/Behavioral:  Negative for depression. Physical Exam  Constitutional:       Appearance: Normal appearance. He is obese. HENT:      Head: Normocephalic. Right Ear: Tympanic membrane normal.      Left Ear: Tympanic membrane normal.      Nose: Nose normal.   Cardiovascular:      Rate and Rhythm: Normal rate and regular rhythm. Pulses: Normal pulses. Heart sounds: Normal heart sounds.    Pulmonary:      Effort: Pulmonary effort is normal. Breath sounds: Normal breath sounds. Abdominal:      Palpations: Abdomen is soft. Musculoskeletal:      Right hand: Normal.      Left hand: Normal. No tenderness or bony tenderness. Hands:       Cervical back: Normal range of motion and neck supple. Lumbar back: Tenderness present. Decreased range of motion. Negative right straight leg raise test and negative left straight leg raise test.        Back:       Comments:      Skin:     General: Skin is warm and dry. Neurological:      Mental Status: He is alert and oriented to person, place, and time. Mental status is at baseline. Psychiatric:         Mood and Affect: Mood normal.     Diagnoses and all orders for this visit:    1. Type 2 diabetes mellitus without complication, without long-term current use of insulin ([facility] [address] well controlled with diet  -     METABOLIC PANEL, COMPREHENSIVE; Future  -     AMB POC HEMOGLOBIN A1C  -     MICROALBUMIN, UR, RAND W/ MICROALB/CREAT RATIO; Future    2. Severe obesity (BMI 35.0-39. 9) with comorbidity ([address]    3. Essential hypertension, benign  -     bisoprolol-hydroCHLOROthiazide (ZIAC) 5-6.25 mg per tablet; Take 1 Tablet by mouth daily. 4. Mixed hyperlipidemia  -     CHOLESTEROL, TOTAL; Future    5. Cerebral aneurysm    6.  Chronic bilateral low back pain without sciatica

## 2023-01-23 NOTE — PROGRESS NOTES
Chief Complaint   Patient presents with    Follow-up     4 month follow up and a1c check- left leg is sore and ankles are swollen       1. \"Have you been to the ER, urgent care clinic since your last visit? Hospitalized since your last visit? \" No    2. \"Have you seen or consulted any other health care providers outside of the [address] [place] [address] since your last visit? \" No     3. For patients aged 39-70: Has the patient had a colonoscopy / FIT/ Cologuard? Yes - no Care Gap present      If the patient is female:    4. For patients aged 41-77: Has the patient had a mammogram within the past 2 years? NA - based on age or sex      11. For patients aged 21-65: Has the patient had a pap smear?  NA - based on age or sex

## 2023-01-24 NOTE — TELEPHONE ENCOUNTER
Received call from [place] at [facility] with Red Flag Complaint. Subjective: Caller states \"Taking metformin 3-4 weeks. Now having low back pain and when I touch it, it will tingle as well as then I move or certain ways. I thought maybe a pulled muscle. Tried biofreeze and tylenol but it isnt working. It only started after taking the medicine. \"     Current Symptoms: Back pain     Onset: 3-4 weeks ago; gradual, unchanged    Associated Symptoms: NA    Pain Severity: 8/10; sore; constant    Temperature: Denies Fever    What has been tried: Biofreeze and tylenol    LMP: NA Pregnant: NA    Recommended disposition: See in office Today or Tomorrow; if no available appointments advised to go to [facility]. Advice to use Cold or heat compresses and to call back with new or worsening symptoms, weakness or bowel/bladder changes. Care advice provided, patient verbalizes understanding; denies any other questions or concerns; instructed to call back for any new or worsening symptoms. Patient/Caller agrees with recommended disposition; writer provided warm transfer to [facility] at [facility] for appointment scheduling    Attention Provider: Thank you for allowing me to participate in the care of your patient. The patient was connected to triage in response to information provided to the [facility]. Please do not respond through this encounter as the response is not directed to a shared pool.       Reason for Disposition   Age > 48 and no history of prior similar back pain    Protocols used: BACK PAIN-ADULT-[place] No further intervention needed at this time. [facility]. No new interventions recommended at this time. Day shift RN also made aware. provider aware provider aware Rafaela Flaherty ACP notified. Awaiting response.

## 2023-01-25 LAB
ALBUMIN SERPL-MCNC: 4.5 G/DL (ref 3.8–4.8)
ALBUMIN/CREAT UR: 7 MG/G CREAT (ref 0–29)
ALBUMIN/GLOB SERPL: 1.8 {RATIO} (ref 1.2–2.2)
ALP SERPL-CCNC: 103 IU/L (ref 44–121)
ALT SERPL-CCNC: 25 IU/L (ref 0–44)
AST SERPL-CCNC: 21 IU/L (ref 0–40)
BILIRUB SERPL-MCNC: 1 MG/DL (ref 0–1.2)
BUN SERPL-MCNC: 12 MG/DL (ref 8–27)
BUN/CREAT SERPL: 11 (ref 10–24)
CALCIUM SERPL-MCNC: 9.6 MG/DL (ref 8.6–10.2)
CHLORIDE SERPL-SCNC: 103 MMOL/L (ref 96–106)
CHOLEST SERPL-MCNC: 145 MG/DL (ref 100–199)
CO2 SERPL-SCNC: 26 MMOL/L (ref 20–29)
CREAT SERPL-MCNC: 1.08 MG/DL (ref 0.76–1.27)
CREAT UR-MCNC: 201.2 MG/DL
EGFR: 74 ML/MIN/1.73
GLOBULIN SER CALC-MCNC: 2.5 G/DL (ref 1.5–4.5)
GLUCOSE SERPL-MCNC: 101 MG/DL (ref 70–99)
MICROALBUMIN UR-MCNC: 14.4 UG/ML
POTASSIUM SERPL-SCNC: 4.3 MMOL/L (ref 3.5–5.2)
PROT SERPL-MCNC: 7 G/DL (ref 6–8.5)
SODIUM SERPL-SCNC: 142 MMOL/L (ref 134–144)

## 2023-02-09 DIAGNOSIS — I10 ESSENTIAL HYPERTENSION, BENIGN: ICD-10-CM

## 2023-02-09 DIAGNOSIS — K21.00 GASTROESOPHAGEAL REFLUX DISEASE WITH ESOPHAGITIS WITHOUT HEMORRHAGE: ICD-10-CM

## 2023-02-10 RX ORDER — DILTIAZEM HYDROCHLORIDE 300 MG/1
CAPSULE, COATED, EXTENDED RELEASE ORAL
Qty: 90 CAPSULE | Refills: 3 | Status: SHIPPED | OUTPATIENT
Start: 2023-02-10

## 2023-02-10 RX ORDER — FAMOTIDINE 40 MG/1
TABLET, FILM COATED ORAL
Qty: 90 TABLET | Refills: 1 | Status: SHIPPED | OUTPATIENT
Start: 2023-02-10

## 2023-04-25 ENCOUNTER — OFFICE VISIT (OUTPATIENT)
Dept: FAMILY MEDICINE CLINIC | Age: 73
End: 2023-04-25
Payer: MEDICARE

## 2023-04-25 VITALS
RESPIRATION RATE: 18 BRPM | DIASTOLIC BLOOD PRESSURE: 86 MMHG | BODY MASS INDEX: 36.38 KG/M2 | HEART RATE: 78 BPM | TEMPERATURE: 96.8 F | OXYGEN SATURATION: 97 % | SYSTOLIC BLOOD PRESSURE: 148 MMHG | HEIGHT: 72 IN | WEIGHT: 268.6 LBS

## 2023-04-25 DIAGNOSIS — E11.9 TYPE 2 DIABETES MELLITUS WITHOUT COMPLICATION, WITHOUT LONG-TERM CURRENT USE OF INSULIN (HCC): ICD-10-CM

## 2023-04-25 DIAGNOSIS — Z23 ENCOUNTER FOR IMMUNIZATION: ICD-10-CM

## 2023-04-25 DIAGNOSIS — E78.2 MIXED HYPERLIPIDEMIA: ICD-10-CM

## 2023-04-25 DIAGNOSIS — M54.50 CHRONIC BILATERAL LOW BACK PAIN WITHOUT SCIATICA: ICD-10-CM

## 2023-04-25 DIAGNOSIS — R35.1 NOCTURIA: ICD-10-CM

## 2023-04-25 DIAGNOSIS — G89.29 CHRONIC BILATERAL LOW BACK PAIN WITHOUT SCIATICA: ICD-10-CM

## 2023-04-25 DIAGNOSIS — I67.1 CEREBRAL ANEURYSM: ICD-10-CM

## 2023-04-25 DIAGNOSIS — I10 ESSENTIAL HYPERTENSION, BENIGN: Primary | ICD-10-CM

## 2023-04-25 LAB — HBA1C MFR BLD HPLC: 6.4 %

## 2023-04-25 PROCEDURE — G8417 CALC BMI ABV UP PARAM F/U: HCPCS | Performed by: FAMILY MEDICINE

## 2023-04-25 PROCEDURE — 1123F ACP DISCUSS/DSCN MKR DOCD: CPT | Performed by: FAMILY MEDICINE

## 2023-04-25 PROCEDURE — G8427 DOCREV CUR MEDS BY ELIG CLIN: HCPCS | Performed by: FAMILY MEDICINE

## 2023-04-25 PROCEDURE — 83036 HEMOGLOBIN GLYCOSYLATED A1C: CPT | Performed by: FAMILY MEDICINE

## 2023-04-25 PROCEDURE — G8510 SCR DEP NEG, NO PLAN REQD: HCPCS | Performed by: FAMILY MEDICINE

## 2023-04-25 PROCEDURE — 3044F HG A1C LEVEL LT 7.0%: CPT | Performed by: FAMILY MEDICINE

## 2023-04-25 PROCEDURE — 1101F PT FALLS ASSESS-DOCD LE1/YR: CPT | Performed by: FAMILY MEDICINE

## 2023-04-25 PROCEDURE — G0463 HOSPITAL OUTPT CLINIC VISIT: HCPCS | Performed by: FAMILY MEDICINE

## 2023-04-25 PROCEDURE — 2022F DILAT RTA XM EVC RTNOPTHY: CPT | Performed by: FAMILY MEDICINE

## 2023-04-25 PROCEDURE — 3077F SYST BP >= 140 MM HG: CPT | Performed by: FAMILY MEDICINE

## 2023-04-25 PROCEDURE — G8536 NO DOC ELDER MAL SCRN: HCPCS | Performed by: FAMILY MEDICINE

## 2023-04-25 PROCEDURE — 3017F COLORECTAL CA SCREEN DOC REV: CPT | Performed by: FAMILY MEDICINE

## 2023-04-25 PROCEDURE — 99213 OFFICE O/P EST LOW 20 MIN: CPT | Performed by: FAMILY MEDICINE

## 2023-04-25 PROCEDURE — 3079F DIAST BP 80-89 MM HG: CPT | Performed by: FAMILY MEDICINE

## 2023-04-25 NOTE — PROGRESS NOTES
Chief Complaint   Patient presents with    Follow-up     3 month follow up and a1c check     1. \"Have you been to the ER, urgent care clinic since your last visit? Hospitalized since your last visit? \" No    2. \"Have you seen or consulted any other health care providers outside of the [address] [place] [address] since your last visit? \" No     3. For patients aged 39-70: Has the patient had a colonoscopy / FIT/ Cologuard? No      If the patient is female:    4. For patients aged 41-77: Has the patient had a mammogram within the past 2 years? NA - based on age or sex      11. For patients aged 21-65: Has the patient had a pap smear?  NA - based on age or sex

## 2023-04-25 NOTE — PROGRESS NOTES
HISTORY OF PRESENT ILLNESS  Ada Jauregui is a 81 y.o. male. f/u  aneurysm,hbp,chol,ar,igt. .Feeling well  Diabetes  The history is provided by the Patient. This is a new problem. The problem occurs daily. The problem has not changed since onset. Hypertension   The history is provided by the Patient. This is a chronic problem. The problem has been gradually worsening. Pertinent negatives include no orthopnea, no palpitations, no malaise/fatigue, no blurred vision, no peripheral edema and no dizziness. Cholesterol Problem  The history is provided by the Patient. This is a chronic problem. The problem occurs daily. The problem has not changed since onset. Follow-up    Review of Systems   Constitutional:  Negative for chills, fever and malaise/fatigue. HENT:  Negative for hearing loss. Eyes:  Negative for blurred vision. Respiratory:  Negative for cough, hemoptysis and sputum production. Cardiovascular:  Negative for palpitations, orthopnea and leg swelling. Gastrointestinal:  Negative for blood in stool, constipation and heartburn. Genitourinary:  Negative for dysuria, frequency and urgency. Musculoskeletal:  Negative for joint pain and myalgias. Neurological:  Negative for dizziness. Psychiatric/Behavioral:  Negative for depression. Physical Exam  Constitutional:       Appearance: Normal appearance. He is obese. HENT:      Head: Normocephalic. Right Ear: Tympanic membrane normal.      Left Ear: Tympanic membrane normal.      Nose: Nose normal.   Cardiovascular:      Rate and Rhythm: Normal rate and regular rhythm. Pulses: Normal pulses. Heart sounds: Normal heart sounds. Pulmonary:      Effort: Pulmonary effort is normal.      Breath sounds: Normal breath sounds. Abdominal:      General: Bowel sounds are normal.      Palpations: Abdomen is soft.    Musculoskeletal:      Right hand: Normal.      Left hand: Normal.        Hands:       Cervical back: Normal range of motion and neck supple. Lumbar back: Tenderness present. Decreased range of motion. Negative right straight leg raise test and negative left straight leg raise test.        Back:       Comments:      Skin:     General: Skin is warm and dry. Neurological:      Mental Status: He is alert and oriented to person, place, and time. Mental status is at baseline. Psychiatric:         Mood and Affect: Mood normal.     Diagnoses and all orders for this visit:    1. Essential hypertension, benign,controlled    2. Type 2 diabetes mellitus without complication, without long-term current use of insulin (HCC),well controlled  -     AMB POC HEMOGLOBIN A1C    3. Mixed hyperlipidemia    4. Cerebral aneurysm    5. Chronic bilateral low back pain without sciatica    6. Encounter for immunization    7. Nocturia  -     PSA, DIAGNOSTIC (PROSTATE SPECIFIC AG); Future                                Follow-up and Dispositions    Return in about 4 weeks (around 5/23/2023).

## 2023-04-26 LAB — PSA SERPL-MCNC: 2.9 NG/ML (ref 0.01–4)

## 2023-04-27 DIAGNOSIS — E78.2 MIXED HYPERLIPIDEMIA: ICD-10-CM

## 2023-04-28 RX ORDER — ATORVASTATIN CALCIUM 10 MG/1
TABLET, FILM COATED ORAL
Qty: 90 TABLET | Refills: 3 | Status: SHIPPED | OUTPATIENT
Start: 2023-04-28

## 2023-05-25 ENCOUNTER — OFFICE VISIT (OUTPATIENT)
Age: 73
End: 2023-05-25

## 2023-05-25 VITALS
WEIGHT: 267.4 LBS | OXYGEN SATURATION: 96 % | RESPIRATION RATE: 18 BRPM | TEMPERATURE: 98.7 F | BODY MASS INDEX: 36.22 KG/M2 | SYSTOLIC BLOOD PRESSURE: 165 MMHG | HEART RATE: 55 BPM | DIASTOLIC BLOOD PRESSURE: 86 MMHG | HEIGHT: 72 IN

## 2023-05-25 DIAGNOSIS — I10 ESSENTIAL (PRIMARY) HYPERTENSION: Primary | ICD-10-CM

## 2023-05-25 DIAGNOSIS — M54.50 LOW BACK PAIN WITHOUT SCIATICA, UNSPECIFIED BACK PAIN LATERALITY, UNSPECIFIED CHRONICITY: ICD-10-CM

## 2023-05-25 DIAGNOSIS — I67.1 CEREBRAL ANEURYSM, NONRUPTURED: ICD-10-CM

## 2023-05-25 DIAGNOSIS — E78.2 MIXED HYPERLIPIDEMIA: ICD-10-CM

## 2023-05-25 DIAGNOSIS — E11.9 TYPE 2 DIABETES MELLITUS WITHOUT COMPLICATION, WITHOUT LONG-TERM CURRENT USE OF INSULIN (HCC): ICD-10-CM

## 2023-05-25 PROCEDURE — 3077F SYST BP >= 140 MM HG: CPT | Performed by: FAMILY MEDICINE

## 2023-05-25 PROCEDURE — 99212 OFFICE O/P EST SF 10 MIN: CPT | Performed by: FAMILY MEDICINE

## 2023-05-25 PROCEDURE — 3079F DIAST BP 80-89 MM HG: CPT | Performed by: FAMILY MEDICINE

## 2023-05-25 PROCEDURE — 1123F ACP DISCUSS/DSCN MKR DOCD: CPT | Performed by: FAMILY MEDICINE

## 2023-05-25 RX ORDER — DILTIAZEM HYDROCHLORIDE 300 MG/1
CAPSULE, COATED, EXTENDED RELEASE ORAL DAILY
COMMUNITY
Start: 2023-04-26

## 2023-05-25 RX ORDER — KETOTIFEN FUMARATE 0.35 MG/ML
1 SOLUTION/ DROPS OPHTHALMIC 2 TIMES DAILY
COMMUNITY
Start: 2023-03-01

## 2023-05-25 RX ORDER — METHOCARBAMOL 500 MG/1
500 TABLET, FILM COATED ORAL
COMMUNITY
Start: 2022-02-18

## 2023-05-25 RX ORDER — SILDENAFIL 50 MG/1
50 TABLET, FILM COATED ORAL
COMMUNITY
Start: 2021-03-22

## 2023-05-25 RX ORDER — BIMATOPROST 0.01 %
1 DROPS OPHTHALMIC (EYE)
COMMUNITY
Start: 2023-01-16

## 2023-05-25 RX ORDER — HYDROCHLOROTHIAZIDE 12.5 MG/1
12.5 TABLET ORAL DAILY
COMMUNITY
Start: 2019-06-16 | End: 2023-05-25

## 2023-05-25 RX ORDER — PANTOPRAZOLE SODIUM 40 MG/1
1 TABLET, DELAYED RELEASE ORAL DAILY
COMMUNITY
Start: 2020-07-27

## 2023-05-25 RX ORDER — MECLIZINE HCL 12.5 MG/1
1 TABLET ORAL 3 TIMES DAILY PRN
COMMUNITY
Start: 2020-07-06

## 2023-05-25 RX ORDER — MULTIVITAMIN
1 TABLET ORAL DAILY
COMMUNITY

## 2023-05-25 RX ORDER — DORZOLAMIDE HCL 20 MG/ML
SOLUTION/ DROPS OPHTHALMIC
COMMUNITY
Start: 2023-03-30

## 2023-05-25 RX ORDER — METFORMIN HYDROCHLORIDE 750 MG/1
750 TABLET, EXTENDED RELEASE ORAL DAILY
COMMUNITY
Start: 2022-04-08

## 2023-05-25 RX ORDER — POLYETHYLENE GLYCOL 3350 17 G/17G
17 POWDER, FOR SOLUTION ORAL DAILY
COMMUNITY

## 2023-05-25 RX ORDER — HYDROCHLOROTHIAZIDE 25 MG/1
1 TABLET ORAL DAILY
COMMUNITY
Start: 2020-10-20

## 2023-05-25 RX ORDER — CHLORAL HYDRATE 500 MG
2 CAPSULE ORAL 2 TIMES DAILY
COMMUNITY

## 2023-05-25 SDOH — ECONOMIC STABILITY: FOOD INSECURITY: WITHIN THE PAST 12 MONTHS, YOU WORRIED THAT YOUR FOOD WOULD RUN OUT BEFORE YOU GOT MONEY TO BUY MORE.: NEVER TRUE

## 2023-05-25 SDOH — ECONOMIC STABILITY: FOOD INSECURITY: WITHIN THE PAST 12 MONTHS, THE FOOD YOU BOUGHT JUST DIDN'T LAST AND YOU DIDN'T HAVE MONEY TO GET MORE.: NEVER TRUE

## 2023-05-25 SDOH — ECONOMIC STABILITY: INCOME INSECURITY: HOW HARD IS IT FOR YOU TO PAY FOR THE VERY BASICS LIKE FOOD, HOUSING, MEDICAL CARE, AND HEATING?: NOT HARD AT ALL

## 2023-05-25 SDOH — ECONOMIC STABILITY: HOUSING INSECURITY
IN THE LAST 12 MONTHS, WAS THERE A TIME WHEN YOU DID NOT HAVE A STEADY PLACE TO SLEEP OR SLEPT IN A SHELTER (INCLUDING NOW)?: NO

## 2023-05-25 ASSESSMENT — PATIENT HEALTH QUESTIONNAIRE - PHQ9
SUM OF ALL RESPONSES TO PHQ QUESTIONS 1-9: 0
1. LITTLE INTEREST OR PLEASURE IN DOING THINGS: 0
2. FEELING DOWN, DEPRESSED OR HOPELESS: 0
SUM OF ALL RESPONSES TO PHQ QUESTIONS 1-9: 0
SUM OF ALL RESPONSES TO PHQ9 QUESTIONS 1 & 2: 0

## 2023-05-25 ASSESSMENT — ENCOUNTER SYMPTOMS
ABDOMINAL PAIN: 0
SHORTNESS OF BREATH: 0

## 2023-05-25 NOTE — PROGRESS NOTES
Subjective:      Patient ID: Eli Hameed is a 81 y.o. male. f/u hbp,feeling well,occasionally high at home ,feeling well    Hypertension  This is a chronic problem. The problem has been waxing and waning since onset. Pertinent negatives include no anxiety, malaise/fatigue, peripheral edema or shortness of breath. There are no associated agents to hypertension. Review of Systems   Constitutional:  Negative for chills and malaise/fatigue. HENT:  Negative for congestion. Respiratory:  Negative for shortness of breath. Gastrointestinal:  Negative for abdominal pain. Genitourinary:  Negative for difficulty urinating. Neurological:  Negative for dizziness. Objective:   Physical Exam  Constitutional:       Appearance: Normal appearance. He is obese. HENT:      Head: Normocephalic and atraumatic. Right Ear: Tympanic membrane normal.      Left Ear: Tympanic membrane normal.      Nose: Nose normal.      Mouth/Throat:      Mouth: Mucous membranes are moist.   Cardiovascular:      Rate and Rhythm: Normal rate and regular rhythm. Pulses: Normal pulses. Heart sounds: Normal heart sounds. Pulmonary:      Effort: Pulmonary effort is normal.      Breath sounds: Normal breath sounds. Abdominal:      General: Abdomen is flat. Palpations: Abdomen is soft. Musculoskeletal:      Cervical back: Normal range of motion and neck supple. Neurological:      Mental Status: He is alert. Manuel Curran was seen today for follow-up.     Diagnoses and all orders for this visit:    Essential (primary) hypertension    Type 2 diabetes mellitus without complication, without long-term current use of insulin (HCC)    Mixed hyperlipidemia    Cerebral aneurysm, nonruptured    Low back pain without sciatica, unspecified back pain laterality, unspecified chronicity              Terrell Lopez MD

## 2023-05-25 NOTE — PROGRESS NOTES
Chief Complaint   Patient presents with    Follow-up     Patient is here today for a 4 week follow up. 1. Have you been to the ER, urgent care clinic since your last visit? Hospitalized since your last visit? No    2. Have you seen or consulted any other health care providers outside of the [address] [place] [address] since your last visit? Include any pap smears or colon screening.  Otolarynology

## 2023-08-29 ENCOUNTER — OFFICE VISIT (OUTPATIENT)
Age: 73
End: 2023-08-29
Payer: MEDICARE

## 2023-08-29 VITALS
WEIGHT: 267.4 LBS | DIASTOLIC BLOOD PRESSURE: 85 MMHG | OXYGEN SATURATION: 96 % | BODY MASS INDEX: 36.22 KG/M2 | RESPIRATION RATE: 18 BRPM | SYSTOLIC BLOOD PRESSURE: 142 MMHG | HEIGHT: 72 IN | TEMPERATURE: 98.6 F | HEART RATE: 52 BPM

## 2023-08-29 DIAGNOSIS — I10 ESSENTIAL (PRIMARY) HYPERTENSION: Primary | ICD-10-CM

## 2023-08-29 DIAGNOSIS — E78.2 MIXED HYPERLIPIDEMIA: ICD-10-CM

## 2023-08-29 DIAGNOSIS — Z11.59 ENCOUNTER FOR HEPATITIS C SCREENING TEST FOR LOW RISK PATIENT: ICD-10-CM

## 2023-08-29 DIAGNOSIS — E11.9 TYPE 2 DIABETES MELLITUS WITHOUT COMPLICATION, WITHOUT LONG-TERM CURRENT USE OF INSULIN (HCC): ICD-10-CM

## 2023-08-29 DIAGNOSIS — Z23 ENCOUNTER FOR IMMUNIZATION: ICD-10-CM

## 2023-08-29 DIAGNOSIS — I67.1 CEREBRAL ANEURYSM, NONRUPTURED: ICD-10-CM

## 2023-08-29 LAB — HBA1C MFR BLD: 6.5 %

## 2023-08-29 PROCEDURE — 99213 OFFICE O/P EST LOW 20 MIN: CPT | Performed by: FAMILY MEDICINE

## 2023-08-29 PROCEDURE — 83036 HEMOGLOBIN GLYCOSYLATED A1C: CPT | Performed by: FAMILY MEDICINE

## 2023-08-29 RX ORDER — OMEPRAZOLE 40 MG/1
CAPSULE, DELAYED RELEASE ORAL DAILY
COMMUNITY
Start: 2023-06-17

## 2023-08-29 ASSESSMENT — PATIENT HEALTH QUESTIONNAIRE - PHQ9
1. LITTLE INTEREST OR PLEASURE IN DOING THINGS: 0
SUM OF ALL RESPONSES TO PHQ QUESTIONS 1-9: 0
SUM OF ALL RESPONSES TO PHQ9 QUESTIONS 1 & 2: 0
SUM OF ALL RESPONSES TO PHQ QUESTIONS 1-9: 0
2. FEELING DOWN, DEPRESSED OR HOPELESS: 0

## 2023-08-29 ASSESSMENT — ENCOUNTER SYMPTOMS
APNEA: 0
CONSTIPATION: 0
RHINORRHEA: 1
ABDOMINAL PAIN: 0

## 2023-08-29 NOTE — PROGRESS NOTES
Chief Complaint   Patient presents with    Follow-up     Patient is here today for a 3 month follow up. 1. Have you been to the ER, urgent care clinic since your last visit? Hospitalized since your last visit? No    2. Have you seen or consulted any other health care providers outside of the [address] [place] [address] since your last visit? Include any pap smears or colon screening.  No

## 2023-08-29 NOTE — PROGRESS NOTES
Subjective:      Patient ID: Sofía Rolon is a 81 y.o. male. f/u dm2,hbp,gerd,chol. Feeling well,some minor musculoskeletal c/o    HPI   Diabetes   The history is provided by the Patient. This is a new problem. The problem occurs daily. The problem has not changed since onset. Hypertension    The history is provided by the Patient. This is a chronic problem. The problem has been  gradually worsening. Pertinent negatives include no orthopnea, no palpitations, no malaise/fatigue, no blurred vision, no peripheral edema and no dizziness. Cholesterol Problem   The history is provided by the Patient. This is a chronic problem. The problem occurs  daily. The  problem has not changed since onset        Review of Systems   Constitutional:  Negative for activity change. HENT:  Positive for rhinorrhea. Negative for congestion. Respiratory:  Negative for apnea. Cardiovascular:  Negative for chest pain, palpitations and leg swelling. Gastrointestinal:  Negative for abdominal pain and constipation. Musculoskeletal:  Positive for myalgias. Psychiatric/Behavioral:  Negative for agitation. The patient is not nervous/anxious. Objective:   Physical Exam  Constitutional:       Appearance: He is obese. HENT:      Right Ear: Tympanic membrane normal.      Left Ear: Tympanic membrane normal.   Cardiovascular:      Rate and Rhythm: Normal rate and regular rhythm. Pulses: Normal pulses. Heart sounds: Normal heart sounds. Pulmonary:      Effort: Pulmonary effort is normal.      Breath sounds: Normal breath sounds. Abdominal:      General: Abdomen is flat. Palpations: Abdomen is soft. Skin:     General: Skin is warm and dry. Neurological:      General: No focal deficit present. Mental Status: He is alert and oriented to person, place, and time. Psychiatric:         Mood and Affect: Mood normal.         Behavior: Behavior normal.       Yary Cesar was seen today for follow-up.     Diagnoses

## 2023-09-18 DIAGNOSIS — K21.00 GASTRO-ESOPHAGEAL REFLUX DISEASE WITH ESOPHAGITIS, WITHOUT BLEEDING: ICD-10-CM

## 2023-09-20 RX ORDER — FAMOTIDINE 40 MG/1
TABLET, FILM COATED ORAL
Qty: 90 TABLET | Refills: 1 | Status: SHIPPED | OUTPATIENT
Start: 2023-09-20

## 2023-09-20 RX ORDER — OMEPRAZOLE 40 MG/1
CAPSULE, DELAYED RELEASE ORAL DAILY
Qty: 90 CAPSULE | Refills: 1 | Status: SHIPPED | OUTPATIENT
Start: 2023-09-20

## 2023-11-12 DIAGNOSIS — I10 ESSENTIAL (PRIMARY) HYPERTENSION: ICD-10-CM

## 2023-11-14 RX ORDER — BISOPROLOL FUMARATE AND HYDROCHLOROTHIAZIDE 5; 6.25 MG/1; MG/1
1 TABLET ORAL DAILY
Qty: 90 TABLET | Refills: 1 | Status: SHIPPED | OUTPATIENT
Start: 2023-11-14

## 2023-11-14 NOTE — TELEPHONE ENCOUNTER
Last appointment: 8/29/23  Next appointment: 11/29/23  Previous refill encounter(s): 1/23/23 #90 with 3 refills    Requested Prescriptions     Pending Prescriptions Disp Refills    bisoprolol-hydroCHLOROthiazide (ZIAC) 5-6.25 MG per tablet [Pharmacy Med Name: BISOPROLOL-HCTZ 5-6.25 MG TAB] 90 tablet 3     Sig: TAKE 1 TABLET BY MOUTH EVERY DAY         For Pharmacy Admin Tracking Only    Program: Medication Refill  CPA in place:    Recommendation Provided To:   Intervention Detail: New Rx: 1, reason: Patient Preference  Intervention Accepted By:   Gap Closed?:    Time Spent (min): 5

## 2023-11-16 RX ORDER — RAMIPRIL 10 MG/1
CAPSULE ORAL
Qty: 180 CAPSULE | Refills: 1 | Status: SHIPPED | OUTPATIENT
Start: 2023-11-16

## 2023-11-16 NOTE — TELEPHONE ENCOUNTER
Last appointment: 8/29/23  Next appointment: 11/29/23  Previous refill encounter(s): 10/16/22    Requested Prescriptions     Pending Prescriptions Disp Refills    ramipril (ALTACE) 10 MG capsule [Pharmacy Med Name: RAMIPRIL 10 MG CAPSULE] 180 capsule 3     Sig: TAKE 2 CAPSULES BY MOUTH EVERY DAY         For Pharmacy Admin Tracking Only    Program: Medication Refill  CPA in place:    Recommendation Provided To:   Intervention Detail: New Rx: 1, reason: Patient Preference  Intervention Accepted By:   Gap Closed?:    Time Spent (min): 5

## 2023-12-12 ENCOUNTER — OFFICE VISIT (OUTPATIENT)
Age: 73
End: 2023-12-12
Payer: MEDICARE

## 2023-12-12 VITALS
HEART RATE: 65 BPM | DIASTOLIC BLOOD PRESSURE: 83 MMHG | TEMPERATURE: 98.7 F | HEIGHT: 72 IN | SYSTOLIC BLOOD PRESSURE: 168 MMHG | RESPIRATION RATE: 18 BRPM | OXYGEN SATURATION: 97 % | WEIGHT: 260.4 LBS | BODY MASS INDEX: 35.27 KG/M2

## 2023-12-12 DIAGNOSIS — Z23 ENCOUNTER FOR IMMUNIZATION: ICD-10-CM

## 2023-12-12 DIAGNOSIS — I10 ESSENTIAL (PRIMARY) HYPERTENSION: ICD-10-CM

## 2023-12-12 DIAGNOSIS — Z00.00 MEDICARE ANNUAL WELLNESS VISIT, SUBSEQUENT: Primary | ICD-10-CM

## 2023-12-12 DIAGNOSIS — I67.1 CEREBRAL ANEURYSM, NONRUPTURED: ICD-10-CM

## 2023-12-12 DIAGNOSIS — E11.9 TYPE 2 DIABETES MELLITUS WITHOUT COMPLICATION, WITHOUT LONG-TERM CURRENT USE OF INSULIN (HCC): ICD-10-CM

## 2023-12-12 DIAGNOSIS — Z11.59 ENCOUNTER FOR HEPATITIS C SCREENING TEST FOR LOW RISK PATIENT: ICD-10-CM

## 2023-12-12 DIAGNOSIS — Z12.11 SCREEN FOR COLON CANCER: ICD-10-CM

## 2023-12-12 DIAGNOSIS — H40.9 GLAUCOMA OF BOTH EYES, UNSPECIFIED GLAUCOMA TYPE: ICD-10-CM

## 2023-12-12 DIAGNOSIS — E78.2 MIXED HYPERLIPIDEMIA: ICD-10-CM

## 2023-12-12 LAB
ALBUMIN SERPL-MCNC: 3.7 G/DL (ref 3.5–5)
ALBUMIN/GLOB SERPL: 1 (ref 1.1–2.2)
ALP SERPL-CCNC: 102 U/L (ref 45–117)
ALT SERPL-CCNC: 22 U/L (ref 12–78)
ANION GAP SERPL CALC-SCNC: 5 MMOL/L (ref 5–15)
AST SERPL-CCNC: 14 U/L (ref 15–37)
BASOPHILS # BLD: 0.1 K/UL (ref 0–0.1)
BASOPHILS NFR BLD: 1 % (ref 0–1)
BILIRUB SERPL-MCNC: 0.8 MG/DL (ref 0.2–1)
BUN SERPL-MCNC: 10 MG/DL (ref 6–20)
BUN/CREAT SERPL: 10 (ref 12–20)
CALCIUM SERPL-MCNC: 8.9 MG/DL (ref 8.5–10.1)
CHLORIDE SERPL-SCNC: 107 MMOL/L (ref 97–108)
CO2 SERPL-SCNC: 30 MMOL/L (ref 21–32)
CREAT SERPL-MCNC: 1.02 MG/DL (ref 0.7–1.3)
DIFFERENTIAL METHOD BLD: ABNORMAL
EOSINOPHIL # BLD: 0.6 K/UL (ref 0–0.4)
EOSINOPHIL NFR BLD: 8 % (ref 0–7)
ERYTHROCYTE [DISTWIDTH] IN BLOOD BY AUTOMATED COUNT: 13.2 % (ref 11.5–14.5)
GLOBULIN SER CALC-MCNC: 3.7 G/DL (ref 2–4)
GLUCOSE SERPL-MCNC: 117 MG/DL (ref 65–100)
HBA1C MFR BLD: 6.2 %
HCT VFR BLD AUTO: 40.3 % (ref 36.6–50.3)
HGB BLD-MCNC: 13.2 G/DL (ref 12.1–17)
IMM GRANULOCYTES # BLD AUTO: 0 K/UL (ref 0–0.04)
IMM GRANULOCYTES NFR BLD AUTO: 0 % (ref 0–0.5)
LYMPHOCYTES # BLD: 2.4 K/UL (ref 0.8–3.5)
LYMPHOCYTES NFR BLD: 32 % (ref 12–49)
MCH RBC QN AUTO: 28.9 PG (ref 26–34)
MCHC RBC AUTO-ENTMCNC: 32.8 G/DL (ref 30–36.5)
MCV RBC AUTO: 88.2 FL (ref 80–99)
MONOCYTES # BLD: 0.7 K/UL (ref 0–1)
MONOCYTES NFR BLD: 10 % (ref 5–13)
NEUTS SEG # BLD: 3.7 K/UL (ref 1.8–8)
NEUTS SEG NFR BLD: 49 % (ref 32–75)
NRBC # BLD: 0 K/UL (ref 0–0.01)
NRBC BLD-RTO: 0 PER 100 WBC
PLATELET # BLD AUTO: 323 K/UL (ref 150–400)
PMV BLD AUTO: 10.1 FL (ref 8.9–12.9)
POTASSIUM SERPL-SCNC: 3.6 MMOL/L (ref 3.5–5.1)
PROT SERPL-MCNC: 7.4 G/DL (ref 6.4–8.2)
RBC # BLD AUTO: 4.57 M/UL (ref 4.1–5.7)
SODIUM SERPL-SCNC: 142 MMOL/L (ref 136–145)
WBC # BLD AUTO: 7.5 K/UL (ref 4.1–11.1)

## 2023-12-12 PROCEDURE — PBSHW AMB POC HEMOGLOBIN A1C: Performed by: FAMILY MEDICINE

## 2023-12-12 PROCEDURE — 99213 OFFICE O/P EST LOW 20 MIN: CPT | Performed by: FAMILY MEDICINE

## 2023-12-12 PROCEDURE — 1036F TOBACCO NON-USER: CPT | Performed by: FAMILY MEDICINE

## 2023-12-12 PROCEDURE — 2022F DILAT RTA XM EVC RTNOPTHY: CPT | Performed by: FAMILY MEDICINE

## 2023-12-12 PROCEDURE — 1123F ACP DISCUSS/DSCN MKR DOCD: CPT | Performed by: FAMILY MEDICINE

## 2023-12-12 PROCEDURE — 3077F SYST BP >= 140 MM HG: CPT | Performed by: FAMILY MEDICINE

## 2023-12-12 PROCEDURE — G8484 FLU IMMUNIZE NO ADMIN: HCPCS | Performed by: FAMILY MEDICINE

## 2023-12-12 PROCEDURE — 3046F HEMOGLOBIN A1C LEVEL >9.0%: CPT | Performed by: FAMILY MEDICINE

## 2023-12-12 PROCEDURE — 83036 HEMOGLOBIN GLYCOSYLATED A1C: CPT | Performed by: FAMILY MEDICINE

## 2023-12-12 PROCEDURE — 3017F COLORECTAL CA SCREEN DOC REV: CPT | Performed by: FAMILY MEDICINE

## 2023-12-12 PROCEDURE — G0439 PPPS, SUBSEQ VISIT: HCPCS | Performed by: FAMILY MEDICINE

## 2023-12-12 PROCEDURE — G8427 DOCREV CUR MEDS BY ELIG CLIN: HCPCS | Performed by: FAMILY MEDICINE

## 2023-12-12 PROCEDURE — 3079F DIAST BP 80-89 MM HG: CPT | Performed by: FAMILY MEDICINE

## 2023-12-12 PROCEDURE — G8417 CALC BMI ABV UP PARAM F/U: HCPCS | Performed by: FAMILY MEDICINE

## 2023-12-12 RX ORDER — OFLOXACIN 3 MG/ML
SOLUTION/ DROPS OPHTHALMIC
COMMUNITY
Start: 2023-11-10 | End: 2023-12-12 | Stop reason: CLARIF

## 2023-12-12 RX ORDER — PREDNISOLONE ACETATE 10 MG/ML
SUSPENSION/ DROPS OPHTHALMIC
COMMUNITY
Start: 2023-10-30

## 2023-12-12 ASSESSMENT — PATIENT HEALTH QUESTIONNAIRE - PHQ9
SUM OF ALL RESPONSES TO PHQ QUESTIONS 1-9: 0
SUM OF ALL RESPONSES TO PHQ QUESTIONS 1-9: 0
2. FEELING DOWN, DEPRESSED OR HOPELESS: 0
SUM OF ALL RESPONSES TO PHQ9 QUESTIONS 1 & 2: 0
1. LITTLE INTEREST OR PLEASURE IN DOING THINGS: 0
SUM OF ALL RESPONSES TO PHQ QUESTIONS 1-9: 0
SUM OF ALL RESPONSES TO PHQ QUESTIONS 1-9: 0

## 2023-12-12 ASSESSMENT — ENCOUNTER SYMPTOMS
BLOOD IN STOOL: 0
CHEST TIGHTNESS: 0
ABDOMINAL PAIN: 0
ANAL BLEEDING: 0
ABDOMINAL DISTENTION: 0

## 2023-12-12 ASSESSMENT — LIFESTYLE VARIABLES
HOW MANY STANDARD DRINKS CONTAINING ALCOHOL DO YOU HAVE ON A TYPICAL DAY: PATIENT DOES NOT DRINK
HOW OFTEN DO YOU HAVE A DRINK CONTAINING ALCOHOL: NEVER

## 2023-12-13 LAB
HCV AB SER IA-ACNC: 0.13 INDEX
HCV AB SERPL QL IA: NONREACTIVE

## 2024-01-02 RX ORDER — OMEPRAZOLE 40 MG/1
CAPSULE, DELAYED RELEASE ORAL DAILY
Qty: 90 CAPSULE | Refills: 1 | Status: SHIPPED | OUTPATIENT
Start: 2024-01-02

## 2024-02-02 DIAGNOSIS — K21.00 GASTRO-ESOPHAGEAL REFLUX DISEASE WITH ESOPHAGITIS, WITHOUT BLEEDING: ICD-10-CM

## 2024-02-02 DIAGNOSIS — I10 ESSENTIAL (PRIMARY) HYPERTENSION: ICD-10-CM

## 2024-02-02 RX ORDER — BISOPROLOL FUMARATE AND HYDROCHLOROTHIAZIDE 5; 6.25 MG/1; MG/1
1 TABLET ORAL DAILY
Qty: 90 TABLET | Refills: 1 | Status: SHIPPED | OUTPATIENT
Start: 2024-02-02

## 2024-02-02 RX ORDER — FAMOTIDINE 40 MG/1
TABLET, FILM COATED ORAL
Qty: 90 TABLET | Refills: 1 | Status: SHIPPED | OUTPATIENT
Start: 2024-02-02

## 2024-02-02 RX ORDER — DILTIAZEM HYDROCHLORIDE 300 MG/1
CAPSULE, COATED, EXTENDED RELEASE ORAL DAILY
Qty: 90 CAPSULE | Refills: 1 | Status: SHIPPED | OUTPATIENT
Start: 2024-02-02

## 2024-02-02 NOTE — TELEPHONE ENCOUNTER
----- Message from Margaret Mcclain sent at 2/2/2024 12:15 PM EST -----  Subject: Refill Request    QUESTIONS  Name of Medication? bisoprolol-hydroCHLOROthiazide (ZIAC) 5-6.25 MG per   tablet  Patient-reported dosage and instructions? 5-6.25mg 1 pill daily  How many days do you have left? 0  [facility]acy? [id number]  Pharmacy phone number (if available)? [phone]  ---------------------------------------------------------------------------  --------------  CALL BACK INFO  What is the best way for the office to contact you? OK to leave message on   voicemail  Preferred Call Back Phone Number? [phone]  ---------------------------------------------------------------------------  --------------  SCRIPT ANSWERS  Relationship to Patient? Self

## 2024-02-02 NOTE — TELEPHONE ENCOUNTER
Last appointment: 12/12/23  Next appointment: 4/12/24  Previous refill encounter(s): 9/20/23 Pepcid #90 with 1 refill, 11/14/23 Ziac #90 with 1 refill, 2/10/23 Cardizem    Requested Prescriptions     Pending Prescriptions Disp Refills    dilTIAZem (CARDIZEM CD) 300 MG extended release capsule [Pharmacy Med Name: DILTIAZEM 24H ER(CD) 300 MG CP] 90 capsule 3     Sig: TAKE 1 CAPSULE BY MOUTH EVERY DAY    famotidine (PEPCID) 40 MG tablet [Pharmacy Med Name: FAMOTIDINE 40 MG TABLET] 90 tablet 3     Sig: TAKE 1 TABLET BY MOUTH EVERY DAY    bisoprolol-hydroCHLOROthiazide (ZIAC) 5-6.25 MG per tablet 90 tablet 3     Sig: Take 1 tablet by mouth daily         For Pharmacy Admin Tracking Only    Program: Medication Refill  CPA in place:    Recommendation Provided To:   Intervention Detail: New Rx: 3, reason: Patient Preference  Intervention Accepted By:   Gap Closed?:    Time Spent (min): 5

## 2024-02-19 ENCOUNTER — OFFICE VISIT (OUTPATIENT)
Age: 74
End: 2024-02-19
Payer: MEDICARE

## 2024-02-19 VITALS
SYSTOLIC BLOOD PRESSURE: 184 MMHG | HEART RATE: 63 BPM | DIASTOLIC BLOOD PRESSURE: 89 MMHG | HEIGHT: 72 IN | BODY MASS INDEX: 35.95 KG/M2 | RESPIRATION RATE: 18 BRPM | WEIGHT: 265.4 LBS | TEMPERATURE: 98.4 F | OXYGEN SATURATION: 97 %

## 2024-02-19 DIAGNOSIS — R05.1 ACUTE COUGH: Primary | ICD-10-CM

## 2024-02-19 DIAGNOSIS — R42 DIZZINESS: ICD-10-CM

## 2024-02-19 DIAGNOSIS — E11.9 TYPE 2 DIABETES MELLITUS WITHOUT COMPLICATION, WITHOUT LONG-TERM CURRENT USE OF INSULIN (HCC): ICD-10-CM

## 2024-02-19 DIAGNOSIS — I10 ESSENTIAL (PRIMARY) HYPERTENSION: ICD-10-CM

## 2024-02-19 LAB — GLUCOSE, POC: 157 MG/DL

## 2024-02-19 PROCEDURE — G8484 FLU IMMUNIZE NO ADMIN: HCPCS | Performed by: FAMILY MEDICINE

## 2024-02-19 PROCEDURE — G8427 DOCREV CUR MEDS BY ELIG CLIN: HCPCS | Performed by: FAMILY MEDICINE

## 2024-02-19 PROCEDURE — 99213 OFFICE O/P EST LOW 20 MIN: CPT | Performed by: FAMILY MEDICINE

## 2024-02-19 PROCEDURE — 3077F SYST BP >= 140 MM HG: CPT | Performed by: FAMILY MEDICINE

## 2024-02-19 PROCEDURE — 3079F DIAST BP 80-89 MM HG: CPT | Performed by: FAMILY MEDICINE

## 2024-02-19 PROCEDURE — 1036F TOBACCO NON-USER: CPT | Performed by: FAMILY MEDICINE

## 2024-02-19 PROCEDURE — 82947 ASSAY GLUCOSE BLOOD QUANT: CPT | Performed by: FAMILY MEDICINE

## 2024-02-19 PROCEDURE — 3017F COLORECTAL CA SCREEN DOC REV: CPT | Performed by: FAMILY MEDICINE

## 2024-02-19 PROCEDURE — PBSHW AMB POC GLUCOSE, QUANTITATIVE, BLOOD: Performed by: FAMILY MEDICINE

## 2024-02-19 PROCEDURE — 3046F HEMOGLOBIN A1C LEVEL >9.0%: CPT | Performed by: FAMILY MEDICINE

## 2024-02-19 PROCEDURE — G8417 CALC BMI ABV UP PARAM F/U: HCPCS | Performed by: FAMILY MEDICINE

## 2024-02-19 PROCEDURE — 1123F ACP DISCUSS/DSCN MKR DOCD: CPT | Performed by: FAMILY MEDICINE

## 2024-02-19 PROCEDURE — 2022F DILAT RTA XM EVC RTNOPTHY: CPT | Performed by: FAMILY MEDICINE

## 2024-02-19 RX ORDER — BISOPROLOL FUMARATE 10 MG/1
10 TABLET, FILM COATED ORAL DAILY
Qty: 30 TABLET | Refills: 3 | Status: SHIPPED | OUTPATIENT
Start: 2024-02-19

## 2024-02-19 ASSESSMENT — PATIENT HEALTH QUESTIONNAIRE - PHQ9
SUM OF ALL RESPONSES TO PHQ QUESTIONS 1-9: 0
1. LITTLE INTEREST OR PLEASURE IN DOING THINGS: 0
SUM OF ALL RESPONSES TO PHQ9 QUESTIONS 1 & 2: 0
SUM OF ALL RESPONSES TO PHQ QUESTIONS 1-9: 0
SUM OF ALL RESPONSES TO PHQ QUESTIONS 1-9: 0
2. FEELING DOWN, DEPRESSED OR HOPELESS: 0
SUM OF ALL RESPONSES TO PHQ QUESTIONS 1-9: 0

## 2024-02-19 ASSESSMENT — ENCOUNTER SYMPTOMS
RHINORRHEA: 1
SINUS PRESSURE: 1
SORE THROAT: 0
COUGH: 1

## 2024-02-19 NOTE — PROGRESS NOTES
Chief Complaint   Patient presents with    Cough     Patient is here today for a cough, sob when coughing, and dizziness.     1. Have you been to the ER, urgent care clinic since your last visit?  Hospitalized since your last visit?No    2. Have you seen or consulted any other health care providers outside of the [facility] since your last visit?  Include any pap smears or colon screening. No

## 2024-02-19 NOTE — PROGRESS NOTES
Subjective:      Patient ID: Hunter Leach is a 73 y.o. male.c/o dry non productive cough for past week,ongoing mild lightheadedness,f/u hbp,chol    Cough  This is a new problem. The current episode started 1 to 4 weeks ago. The problem has been unchanged. The problem occurs every few minutes. The cough is Non-productive. Associated symptoms include nasal congestion, postnasal drip and rhinorrhea. Pertinent negatives include no sore throat or sweats.   Dizziness  This is a recurrent problem. The current episode started 1 to 4 weeks ago. The problem occurs daily. The problem has been unchanged. Associated symptoms include congestion and coughing. Pertinent negatives include no fatigue or sore throat.    Diabetes   The history is provided by the Patient. This is a new problem. The problem occurs daily. The problem has not changed since onset.   Hypertension    The history is provided by the Patient. This is a chronic problem. The problem has been  gradually worsening.  Pertinent negatives include no orthopnea, no palpitations, no malaise/fatigue, no blurred vision, no peripheral edema and no dizziness.    Cholesterol Problem   The history is provided by the Patient. This is a chronic problem. The problem occurs  daily. The  problem has not changed since onset          Review of Systems   Constitutional:  Negative for fatigue.   HENT:  Positive for congestion, postnasal drip, rhinorrhea and sinus pressure. Negative for sore throat.    Respiratory:  Positive for cough.    Neurological:  Positive for dizziness and light-headedness. Negative for facial asymmetry.   Hematological:  Negative for adenopathy.     Objective:   Physical Exam  Constitutional:       Appearance: Normal appearance. He is obese.   HENT:      Head: Normocephalic and atraumatic.      Right Ear: Tympanic membrane normal.      Left Ear: Tympanic membrane normal.      Nose: Nose normal.   Cardiovascular:      Rate and Rhythm: Normal rate and regular

## 2024-03-14 DIAGNOSIS — I10 ESSENTIAL (PRIMARY) HYPERTENSION: ICD-10-CM

## 2024-03-14 RX ORDER — BISOPROLOL FUMARATE 10 MG/1
10 TABLET, FILM COATED ORAL DAILY
Qty: 90 TABLET | Refills: 1 | Status: SHIPPED | OUTPATIENT
Start: 2024-03-14

## 2024-03-19 ENCOUNTER — OFFICE VISIT (OUTPATIENT)
Age: 74
End: 2024-03-19
Payer: MEDICARE

## 2024-03-19 VITALS
RESPIRATION RATE: 18 BRPM | BODY MASS INDEX: 35.57 KG/M2 | WEIGHT: 262.6 LBS | SYSTOLIC BLOOD PRESSURE: 190 MMHG | OXYGEN SATURATION: 98 % | TEMPERATURE: 98.3 F | DIASTOLIC BLOOD PRESSURE: 93 MMHG | HEART RATE: 52 BPM | HEIGHT: 72 IN

## 2024-03-19 DIAGNOSIS — I10 ESSENTIAL HYPERTENSION, BENIGN: Primary | ICD-10-CM

## 2024-03-19 DIAGNOSIS — E78.2 MIXED HYPERLIPIDEMIA: ICD-10-CM

## 2024-03-19 DIAGNOSIS — E11.9 TYPE 2 DIABETES MELLITUS WITHOUT COMPLICATION, WITH LONG-TERM CURRENT USE OF INSULIN (HCC): ICD-10-CM

## 2024-03-19 DIAGNOSIS — J20.9 ACUTE BRONCHITIS, UNSPECIFIED ORGANISM: ICD-10-CM

## 2024-03-19 DIAGNOSIS — R42 DIZZINESS: ICD-10-CM

## 2024-03-19 DIAGNOSIS — Z79.4 TYPE 2 DIABETES MELLITUS WITHOUT COMPLICATION, WITH LONG-TERM CURRENT USE OF INSULIN (HCC): ICD-10-CM

## 2024-03-19 LAB — HBA1C MFR BLD: 6.1 %

## 2024-03-19 PROCEDURE — 99213 OFFICE O/P EST LOW 20 MIN: CPT | Performed by: FAMILY MEDICINE

## 2024-03-19 PROCEDURE — 3077F SYST BP >= 140 MM HG: CPT | Performed by: FAMILY MEDICINE

## 2024-03-19 PROCEDURE — 2022F DILAT RTA XM EVC RTNOPTHY: CPT | Performed by: FAMILY MEDICINE

## 2024-03-19 PROCEDURE — 1123F ACP DISCUSS/DSCN MKR DOCD: CPT | Performed by: FAMILY MEDICINE

## 2024-03-19 PROCEDURE — 1036F TOBACCO NON-USER: CPT | Performed by: FAMILY MEDICINE

## 2024-03-19 PROCEDURE — G8417 CALC BMI ABV UP PARAM F/U: HCPCS | Performed by: FAMILY MEDICINE

## 2024-03-19 PROCEDURE — 3046F HEMOGLOBIN A1C LEVEL >9.0%: CPT | Performed by: FAMILY MEDICINE

## 2024-03-19 PROCEDURE — G8427 DOCREV CUR MEDS BY ELIG CLIN: HCPCS | Performed by: FAMILY MEDICINE

## 2024-03-19 PROCEDURE — 3017F COLORECTAL CA SCREEN DOC REV: CPT | Performed by: FAMILY MEDICINE

## 2024-03-19 PROCEDURE — G8484 FLU IMMUNIZE NO ADMIN: HCPCS | Performed by: FAMILY MEDICINE

## 2024-03-19 PROCEDURE — 83036 HEMOGLOBIN GLYCOSYLATED A1C: CPT | Performed by: FAMILY MEDICINE

## 2024-03-19 PROCEDURE — PBSHW AMB POC HEMOGLOBIN A1C: Performed by: FAMILY MEDICINE

## 2024-03-19 PROCEDURE — 3080F DIAST BP >= 90 MM HG: CPT | Performed by: FAMILY MEDICINE

## 2024-03-19 RX ORDER — AMOXICILLIN 500 MG/1
500 CAPSULE ORAL 3 TIMES DAILY
Qty: 21 CAPSULE | Refills: 0 | Status: SHIPPED | OUTPATIENT
Start: 2024-03-19 | End: 2024-03-26

## 2024-03-19 RX ORDER — LISINOPRIL 20 MG/1
20 TABLET ORAL DAILY
Qty: 30 TABLET | Refills: 5 | Status: SHIPPED | OUTPATIENT
Start: 2024-03-19

## 2024-03-19 ASSESSMENT — PATIENT HEALTH QUESTIONNAIRE - PHQ9
SUM OF ALL RESPONSES TO PHQ QUESTIONS 1-9: 0
2. FEELING DOWN, DEPRESSED OR HOPELESS: NOT AT ALL
SUM OF ALL RESPONSES TO PHQ QUESTIONS 1-9: 0
SUM OF ALL RESPONSES TO PHQ9 QUESTIONS 1 & 2: 0
SUM OF ALL RESPONSES TO PHQ QUESTIONS 1-9: 0
SUM OF ALL RESPONSES TO PHQ QUESTIONS 1-9: 0
1. LITTLE INTEREST OR PLEASURE IN DOING THINGS: NOT AT ALL

## 2024-03-19 ASSESSMENT — ENCOUNTER SYMPTOMS
RHINORRHEA: 1
COUGH: 1
WHEEZING: 0
CHEST TIGHTNESS: 0
STRIDOR: 1
ABDOMINAL DISTENTION: 0
EYE DISCHARGE: 0

## 2024-03-19 NOTE — PROGRESS NOTES
Subjective:      Patient ID: Hunter Leach is a 73 y.o. male.c/o cough,chest congestion for past few weeks.F/U HBP,Dizziness,chol,dm2    HPI  Cough  This is a new problem. The current episode started 1 to 4 weeks ago. The problem has been unchanged. The problem occurs every few minutes. The cough is Non-productive. Associated symptoms include nasal congestion, postnasal drip and rhinorrhea. Pertinent negatives include no sore throat or sweats.   Dizziness  This is a recurrent problem. The current episode started 1 to 4 weeks ago. The problem occurs daily. The problem has been unchanged. Associated symptoms include congestion and coughing. Pertinent negatives include no fatigue or sore throat.    Diabetes   The history is provided by the Patient. This is a new problem. The problem occurs daily. The problem has not changed since onset.   Hypertension    The history is provided by the Patient. This is a chronic problem. The problem has been  gradually worsening.  Pertinent negatives include no orthopnea, no palpitations, no malaise/fatigue, no blurred vision, no peripheral edema and no dizziness.    Cholesterol Problem   The history is provided by the Patient. This is a chronic problem. The problem occurs  daily. The  problem has not changed since onset  Review of Systems   Constitutional:  Negative for activity change.   HENT:  Positive for postnasal drip and rhinorrhea.    Eyes:  Negative for discharge.   Respiratory:  Positive for cough and stridor. Negative for chest tightness and wheezing.    Cardiovascular:  Negative for chest pain and palpitations.   Gastrointestinal:  Negative for abdominal distention.   Musculoskeletal:  Negative for arthralgias.   Neurological:  Positive for dizziness. Negative for light-headedness.     Objective:   Physical Exam  Constitutional:       Appearance: Normal appearance. He is obese.   HENT:      Head: Normocephalic and atraumatic.   Cardiovascular:      Rate and Rhythm: Normal

## 2024-03-19 NOTE — PROGRESS NOTES
Chief Complaint   Patient presents with    Follow-up     Patient is here today for a 4 week follow up.      1. Have you been to the ER, urgent care clinic since your last visit?  Hospitalized since your last visit?No    2. Have you seen or consulted any other health care providers outside of the [facility] since your last visit?  Include any pap smears or colon screening. No

## 2024-03-25 DIAGNOSIS — E78.2 MIXED HYPERLIPIDEMIA: ICD-10-CM

## 2024-03-27 RX ORDER — ATORVASTATIN CALCIUM 10 MG/1
TABLET, FILM COATED ORAL
Qty: 90 TABLET | Refills: 3 | Status: SHIPPED | OUTPATIENT
Start: 2024-03-27

## 2024-03-27 NOTE — TELEPHONE ENCOUNTER
Last appointment: 3/19/24  Next appointment: 4/12/24  Previous refill encounter(s): 4/28/23    Requested Prescriptions     Pending Prescriptions Disp Refills    atorvastatin (LIPITOR) 10 MG tablet [Pharmacy Med Name: ATORVASTATIN 10 MG TABLET] 90 tablet 3     Sig: TAKE 1 TABLET BY MOUTH EVERY DAY         For Pharmacy Admin Tracking Only    Program: Medication Refill  CPA in place:    Recommendation Provided To:   Intervention Detail: New Rx: 1, reason: Patient Preference  Intervention Accepted By:   Gap Closed?:    Time Spent (min): 5

## 2024-04-10 RX ORDER — RAMIPRIL 10 MG/1
CAPSULE ORAL
Qty: 180 CAPSULE | Refills: 3 | Status: SHIPPED | OUTPATIENT
Start: 2024-04-10

## 2024-04-10 NOTE — TELEPHONE ENCOUNTER
Last appointment: 3/19/24  Next appointment: 4/12/24  Previous refill encounter(s): 11/16/23 #180 with 1 refill    Requested Prescriptions     Pending Prescriptions Disp Refills    ramipril (ALTACE) 10 MG capsule [Pharmacy Med Name: RAMIPRIL 10 MG CAPSULE] 180 capsule 3     Sig: TAKE 2 CAPSULES BY MOUTH EVERY DAY         For Pharmacy Admin Tracking Only    Program: Medication Refill  CPA in place:    Recommendation Provided To:   Intervention Detail: New Rx: 1, reason: Patient Preference  Intervention Accepted By: Gap Closed?:    Time Spent (min): 5

## 2024-04-18 ENCOUNTER — OFFICE VISIT (OUTPATIENT)
Age: 74
End: 2024-04-18
Payer: MEDICARE

## 2024-04-18 VITALS
WEIGHT: 261.6 LBS | BODY MASS INDEX: 35.43 KG/M2 | DIASTOLIC BLOOD PRESSURE: 79 MMHG | OXYGEN SATURATION: 96 % | HEART RATE: 56 BPM | SYSTOLIC BLOOD PRESSURE: 162 MMHG | HEIGHT: 72 IN | RESPIRATION RATE: 18 BRPM | TEMPERATURE: 98.5 F

## 2024-04-18 DIAGNOSIS — I10 ESSENTIAL HYPERTENSION, BENIGN: Primary | ICD-10-CM

## 2024-04-18 DIAGNOSIS — I73.9 PERIPHERAL VASCULAR DISEASE, UNSPECIFIED (HCC): ICD-10-CM

## 2024-04-18 DIAGNOSIS — M54.42 ACUTE LEFT-SIDED LOW BACK PAIN WITH LEFT-SIDED SCIATICA: ICD-10-CM

## 2024-04-18 DIAGNOSIS — R42 DIZZINESS: ICD-10-CM

## 2024-04-18 DIAGNOSIS — J30.89 NON-SEASONAL ALLERGIC RHINITIS, UNSPECIFIED TRIGGER: ICD-10-CM

## 2024-04-18 DIAGNOSIS — E66.01 SEVERE OBESITY (BMI 35.0-39.9) WITH COMORBIDITY (HCC): ICD-10-CM

## 2024-04-18 DIAGNOSIS — Z79.4 TYPE 2 DIABETES MELLITUS WITHOUT COMPLICATION, WITH LONG-TERM CURRENT USE OF INSULIN (HCC): ICD-10-CM

## 2024-04-18 DIAGNOSIS — E11.9 TYPE 2 DIABETES MELLITUS WITHOUT COMPLICATION, WITH LONG-TERM CURRENT USE OF INSULIN (HCC): ICD-10-CM

## 2024-04-18 DIAGNOSIS — J45.20 MILD INTERMITTENT ASTHMA WITHOUT COMPLICATION: ICD-10-CM

## 2024-04-18 PROCEDURE — 2022F DILAT RTA XM EVC RTNOPTHY: CPT | Performed by: FAMILY MEDICINE

## 2024-04-18 PROCEDURE — G8427 DOCREV CUR MEDS BY ELIG CLIN: HCPCS | Performed by: FAMILY MEDICINE

## 2024-04-18 PROCEDURE — 3046F HEMOGLOBIN A1C LEVEL >9.0%: CPT | Performed by: FAMILY MEDICINE

## 2024-04-18 PROCEDURE — 3017F COLORECTAL CA SCREEN DOC REV: CPT | Performed by: FAMILY MEDICINE

## 2024-04-18 PROCEDURE — 1036F TOBACCO NON-USER: CPT | Performed by: FAMILY MEDICINE

## 2024-04-18 PROCEDURE — 3078F DIAST BP <80 MM HG: CPT | Performed by: FAMILY MEDICINE

## 2024-04-18 PROCEDURE — 99213 OFFICE O/P EST LOW 20 MIN: CPT | Performed by: FAMILY MEDICINE

## 2024-04-18 PROCEDURE — 1123F ACP DISCUSS/DSCN MKR DOCD: CPT | Performed by: FAMILY MEDICINE

## 2024-04-18 PROCEDURE — 3077F SYST BP >= 140 MM HG: CPT | Performed by: FAMILY MEDICINE

## 2024-04-18 PROCEDURE — G8417 CALC BMI ABV UP PARAM F/U: HCPCS | Performed by: FAMILY MEDICINE

## 2024-04-18 RX ORDER — ANTIFUNGAL POWDER MOISTURE ABSORBING 1.42 G/71G
POWDER TOPICAL
COMMUNITY
Start: 2024-03-25

## 2024-04-18 RX ORDER — LOSARTAN POTASSIUM AND HYDROCHLOROTHIAZIDE 12.5; 1 MG/1; MG/1
1 TABLET ORAL DAILY
Qty: 30 TABLET | Refills: 3 | Status: SHIPPED | OUTPATIENT
Start: 2024-04-18

## 2024-04-18 ASSESSMENT — ENCOUNTER SYMPTOMS
WHEEZING: 0
BACK PAIN: 1
CHEST TIGHTNESS: 0
SHORTNESS OF BREATH: 0

## 2024-04-18 ASSESSMENT — PATIENT HEALTH QUESTIONNAIRE - PHQ9
SUM OF ALL RESPONSES TO PHQ QUESTIONS 1-9: 0
2. FEELING DOWN, DEPRESSED OR HOPELESS: NOT AT ALL
SUM OF ALL RESPONSES TO PHQ9 QUESTIONS 1 & 2: 0
1. LITTLE INTEREST OR PLEASURE IN DOING THINGS: NOT AT ALL
SUM OF ALL RESPONSES TO PHQ QUESTIONS 1-9: 0

## 2024-04-18 NOTE — PROGRESS NOTES
Chief Complaint   Patient presents with    Follow-up     Patient is here today for a 4 month follow up.      1. Have you been to the ER, urgent care clinic since your last visit?  Hospitalized since your last visit?No    2. Have you seen or consulted any other health care providers outside of the [facility] since your last visit?  Include any pap smears or colon screening. No

## 2024-05-03 ENCOUNTER — TELEPHONE (OUTPATIENT)
Age: 74
End: 2024-05-03

## 2024-05-03 NOTE — TELEPHONE ENCOUNTER
Patient called  to report BP reading 147/73 pulse 52, pulse ox 99 at 11:30 today. SOB  since taking Losartan HCTZ on and off. Please call [phone]

## 2024-05-28 ENCOUNTER — OFFICE VISIT (OUTPATIENT)
Age: 74
End: 2024-05-28
Payer: MEDICARE

## 2024-05-28 VITALS
SYSTOLIC BLOOD PRESSURE: 169 MMHG | OXYGEN SATURATION: 95 % | RESPIRATION RATE: 18 BRPM | WEIGHT: 261.2 LBS | BODY MASS INDEX: 35.38 KG/M2 | TEMPERATURE: 98.3 F | HEART RATE: 55 BPM | DIASTOLIC BLOOD PRESSURE: 86 MMHG | HEIGHT: 72 IN

## 2024-05-28 DIAGNOSIS — E11.9 TYPE 2 DIABETES MELLITUS WITHOUT COMPLICATION, WITH LONG-TERM CURRENT USE OF INSULIN (HCC): ICD-10-CM

## 2024-05-28 DIAGNOSIS — K21.00 GASTROESOPHAGEAL REFLUX DISEASE WITH ESOPHAGITIS WITHOUT HEMORRHAGE: ICD-10-CM

## 2024-05-28 DIAGNOSIS — I10 ESSENTIAL HYPERTENSION, BENIGN: Primary | ICD-10-CM

## 2024-05-28 DIAGNOSIS — M54.50 ACUTE RIGHT-SIDED LOW BACK PAIN WITHOUT SCIATICA: ICD-10-CM

## 2024-05-28 DIAGNOSIS — I10 ESSENTIAL HYPERTENSION, BENIGN: ICD-10-CM

## 2024-05-28 DIAGNOSIS — Z79.4 TYPE 2 DIABETES MELLITUS WITHOUT COMPLICATION, WITH LONG-TERM CURRENT USE OF INSULIN (HCC): ICD-10-CM

## 2024-05-28 PROCEDURE — 99213 OFFICE O/P EST LOW 20 MIN: CPT | Performed by: FAMILY MEDICINE

## 2024-05-28 PROCEDURE — 2022F DILAT RTA XM EVC RTNOPTHY: CPT | Performed by: FAMILY MEDICINE

## 2024-05-28 PROCEDURE — 3077F SYST BP >= 140 MM HG: CPT | Performed by: FAMILY MEDICINE

## 2024-05-28 PROCEDURE — 3079F DIAST BP 80-89 MM HG: CPT | Performed by: FAMILY MEDICINE

## 2024-05-28 PROCEDURE — 1123F ACP DISCUSS/DSCN MKR DOCD: CPT | Performed by: FAMILY MEDICINE

## 2024-05-28 PROCEDURE — G8427 DOCREV CUR MEDS BY ELIG CLIN: HCPCS | Performed by: FAMILY MEDICINE

## 2024-05-28 PROCEDURE — 3046F HEMOGLOBIN A1C LEVEL >9.0%: CPT | Performed by: FAMILY MEDICINE

## 2024-05-28 PROCEDURE — 1036F TOBACCO NON-USER: CPT | Performed by: FAMILY MEDICINE

## 2024-05-28 PROCEDURE — 3017F COLORECTAL CA SCREEN DOC REV: CPT | Performed by: FAMILY MEDICINE

## 2024-05-28 PROCEDURE — G8417 CALC BMI ABV UP PARAM F/U: HCPCS | Performed by: FAMILY MEDICINE

## 2024-05-28 RX ORDER — RAMIPRIL 10 MG/1
20 CAPSULE ORAL DAILY
COMMUNITY

## 2024-05-28 RX ORDER — CLONIDINE HYDROCHLORIDE 0.1 MG/1
0.1 TABLET ORAL
Qty: 30 TABLET | Refills: 3 | Status: SHIPPED | OUTPATIENT
Start: 2024-05-28

## 2024-05-28 RX ORDER — MONTELUKAST SODIUM 10 MG/1
10 TABLET ORAL NIGHTLY
COMMUNITY

## 2024-05-28 RX ORDER — TIZANIDINE 2 MG/1
2 TABLET ORAL 3 TIMES DAILY PRN
Qty: 30 TABLET | Refills: 0 | Status: SHIPPED | OUTPATIENT
Start: 2024-05-28

## 2024-05-28 RX ORDER — BISOPROLOL FUMARATE AND HYDROCHLOROTHIAZIDE 5; 6.25 MG/1; MG/1
1 TABLET ORAL DAILY
COMMUNITY
Start: 2024-04-29 | End: 2024-05-28 | Stop reason: CLARIF

## 2024-05-28 RX ORDER — POLYETHYLENE GLYCOL 3350 17 G/17G
17 POWDER, FOR SOLUTION ORAL DAILY PRN
COMMUNITY

## 2024-05-28 SDOH — ECONOMIC STABILITY: FOOD INSECURITY: WITHIN THE PAST 12 MONTHS, YOU WORRIED THAT YOUR FOOD WOULD RUN OUT BEFORE YOU GOT MONEY TO BUY MORE.: NEVER TRUE

## 2024-05-28 SDOH — ECONOMIC STABILITY: INCOME INSECURITY: HOW HARD IS IT FOR YOU TO PAY FOR THE VERY BASICS LIKE FOOD, HOUSING, MEDICAL CARE, AND HEATING?: NOT HARD AT ALL

## 2024-05-28 SDOH — ECONOMIC STABILITY: FOOD INSECURITY: WITHIN THE PAST 12 MONTHS, THE FOOD YOU BOUGHT JUST DIDN'T LAST AND YOU DIDN'T HAVE MONEY TO GET MORE.: NEVER TRUE

## 2024-05-28 ASSESSMENT — PATIENT HEALTH QUESTIONNAIRE - PHQ9
SUM OF ALL RESPONSES TO PHQ9 QUESTIONS 1 & 2: 0
SUM OF ALL RESPONSES TO PHQ QUESTIONS 1-9: 0
1. LITTLE INTEREST OR PLEASURE IN DOING THINGS: NOT AT ALL
SUM OF ALL RESPONSES TO PHQ QUESTIONS 1-9: 0
SUM OF ALL RESPONSES TO PHQ QUESTIONS 1-9: 0
2. FEELING DOWN, DEPRESSED OR HOPELESS: NOT AT ALL
SUM OF ALL RESPONSES TO PHQ QUESTIONS 1-9: 0

## 2024-05-28 ASSESSMENT — ENCOUNTER SYMPTOMS
ABDOMINAL DISTENTION: 0
EYE DISCHARGE: 0
COUGH: 0
STRIDOR: 0
APNEA: 0
SHORTNESS OF BREATH: 0
BACK PAIN: 1

## 2024-05-28 NOTE — PROGRESS NOTES
Chief Complaint   Patient presents with    Follow-up     Patient is here today for a 4 week follow up.      1. Have you been to the ER, urgent care clinic since your last visit?  Hospitalized since your last visit?No    2. Have you seen or consulted any other health care providers outside of the [facility] since your last visit?  Include any pap smears or colon screening. No

## 2024-05-28 NOTE — PROGRESS NOTES
NAME:  Hunter Leach   :   1950   MRN:   [id number]     Date/Time:  2024 12:38 PM  Subjective:f/u hbp,new rt sided low back pain with no apparent injury.Losartan d/cd   Back Pain  This is a new problem. The current episode started 1 to 4 weeks ago. The problem occurs daily. The problem is unchanged. The pain is present in the lumbar spine. The pain does not radiate. The pain is at a severity of 4/10. The pain is Worse during the day. The symptoms are aggravated by bending, standing and twisting. Stiffness is present In the morning.   Hypertension  This is a chronic problem. The problem has been gradually worsening since onset. The problem is uncontrolled. Pertinent negatives include no anxiety, malaise/fatigue, peripheral edema, PND or shortness of breath. Past treatments include beta blockers, calcium channel blockers, diuretics and ACE inhibitors. The current treatment provides moderate improvement.    Review of Systems   Constitutional:  Negative for activity change and malaise/fatigue.   Eyes:  Negative for discharge.   Respiratory:  Negative for apnea, cough, shortness of breath and stridor.    Cardiovascular:  Negative for PND.   Gastrointestinal:  Negative for abdominal distention.   Genitourinary:  Negative for difficulty urinating.   Musculoskeletal:  Positive for arthralgias and back pain.           Medications reviewed:  Current Outpatient Medications   Medication Sig    ramipril (ALTACE) 10 MG capsule Take 2 capsules by mouth daily    montelukast (SINGULAIR) 10 MG tablet Take 1 tablet by mouth nightly    polyethylene glycol (MIRALAX) 17 g PACK packet Take 17 g by mouth daily as needed    tiZANidine (ZANAFLEX) 2 MG tablet Take 1 tablet by mouth 3 times daily as needed (pain)    cloNIDine (CATAPRES) 0.1 MG tablet Take 1 tablet by mouth nightly    atorvastatin (LIPITOR) 10 MG tablet TAKE 1 TABLET BY MOUTH EVERY DAY    bisoprolol (ZEBETA) 10 MG tablet TAKE 1 TABLET BY MOUTH EVERY DAY

## 2024-05-29 LAB
ANION GAP SERPL CALC-SCNC: 5 MMOL/L (ref 5–15)
BUN SERPL-MCNC: 12 MG/DL (ref 6–20)
BUN/CREAT SERPL: 12 (ref 12–20)
CALCIUM SERPL-MCNC: 9.9 MG/DL (ref 8.5–10.1)
CHLORIDE SERPL-SCNC: 106 MMOL/L (ref 97–108)
CO2 SERPL-SCNC: 29 MMOL/L (ref 21–32)
CREAT SERPL-MCNC: 1 MG/DL (ref 0.7–1.3)
GLUCOSE SERPL-MCNC: 103 MG/DL (ref 65–100)
POTASSIUM SERPL-SCNC: 4.2 MMOL/L (ref 3.5–5.1)
SODIUM SERPL-SCNC: 140 MMOL/L (ref 136–145)

## 2024-06-03 ENCOUNTER — TELEPHONE (OUTPATIENT)
Age: 74
End: 2024-06-03

## 2024-06-03 DIAGNOSIS — I10 ESSENTIAL HYPERTENSION, BENIGN: Primary | ICD-10-CM

## 2024-06-03 RX ORDER — INDAPAMIDE 1.25 MG/1
1.25 TABLET ORAL EVERY MORNING
Qty: 30 TABLET | Refills: 5 | Status: SHIPPED | OUTPATIENT
Start: 2024-06-03

## 2024-06-03 NOTE — TELEPHONE ENCOUNTER
Medication: tiZANidine (ZANAFLEX) 2 MG tablet   Dosage: 3 a day   Ordering Provider: Marvin Acosta     Question/Problem: This medication is not working for pain & it is causing   blurry vision.      Medication: cloNIDine (CATAPRES) 0.1 MG tablet   Dosage: 1 a day at bedtime   Ordering Provider: Marvin Acosta     Question/Problem: This is also causing blurry vision       Patient can be reached at [phone]  ------------------------------------------------------  Pt having problems with these two medications.

## 2024-06-03 NOTE — TELEPHONE ENCOUNTER
Medication: tiZANidine (ZANAFLEX) 2 MG tablet   Dosage: 3 a day   Ordering Provider: Marvin Acosta     Question/Problem: This medication is not working for pain & it is causing   blurry vision.       Pharmacy: [facility] [address] [place] [address] [address] [facility] -    [phone] - F [phone]      Medication: cloNIDine (CATAPRES) 0.1 MG tablet   Dosage: 1 a day at bedtime   Ordering Provider: Marvin Acosta     Question/Problem: This is also causing blurry vision       Pharmacy: [facility]Y [address] [place] [address] - [address] [facility] -    [phone] - F [phone]   Patient can be reached at [phone]

## 2024-06-14 RX ORDER — BISOPROLOL FUMARATE AND HYDROCHLOROTHIAZIDE 5; 6.25 MG/1; MG/1
1 TABLET ORAL DAILY
Qty: 90 TABLET | Refills: 3 | Status: SHIPPED | OUTPATIENT
Start: 2024-06-14

## 2024-06-14 RX ORDER — MONTELUKAST SODIUM 10 MG/1
10 TABLET ORAL NIGHTLY
Qty: 90 TABLET | Refills: 3 | Status: SHIPPED | OUTPATIENT
Start: 2024-06-14

## 2024-06-14 NOTE — TELEPHONE ENCOUNTER
Last appointment: 5/28/24  Next appointment: 7/10/24  Previous refill encounter(s): 3/14/24 Zebeta #90 with 1 refill    Requested Prescriptions     Pending Prescriptions Disp Refills    bisoprolol-hydroCHLOROthiazide (ZIAC) 5-6.25 MG per tablet [Pharmacy Med Name: BISOPROLOL-HCTZ 5-6.25 MG TAB] 90 tablet 3     Sig: TAKE 1 TABLET BY MOUTH EVERY DAY    montelukast (SINGULAIR) 10 MG tablet 90 tablet 3     Sig: Take 1 tablet by mouth nightly         For Pharmacy Admin Tracking Only    Program: Medication Refill  CPA in place:    Recommendation Provided To:   Intervention Detail: New Rx: 2, reason: Patient Preference  Intervention Accepted By:   Gap Closed?:    Time Spent (min): 5

## 2024-06-14 NOTE — TELEPHONE ENCOUNTER
----- Message from Nhi Oseguera sent at 6/14/2024  8:46 AM EDT -----  Subject: Refill Request    QUESTIONS  Name of Medication? Other - montelukast (SINGULAIR) 10 MG tablet   [[id number]]  Patient-reported dosage and instructions? 10 MG 1 at bedtime  How many days do you have left? 0  [id number]  Pharmacy phone number (if available)? [phone]  ---------------------------------------------------------------------------  --------------  CALL BACK INFO  What is the best way for the office to contact you? OK to leave message on   voicemail  Preferred Call Back Phone Number? [phone]  ---------------------------------------------------------------------------  --------------  SCRIPT ANSWERS  Relationship to Patient? Self

## 2024-06-18 ENCOUNTER — TELEPHONE (OUTPATIENT)
Age: 74
End: 2024-06-18

## 2024-06-18 NOTE — TELEPHONE ENCOUNTER
Pt said that last Thursday his BP was 138/59 and his pulse was 40. Today his BP is 141/70 and his pulse is 42. He would like to know if he should keep taking the new BP meds. His best call back number is [phone]

## 2024-07-01 ENCOUNTER — TELEPHONE (OUTPATIENT)
Age: 74
End: 2024-07-01

## 2024-07-01 NOTE — TELEPHONE ENCOUNTER
Patient is requesting a RX refill ramipril (ALTACE) 10 MG capsule he can be reached @ [phone]

## 2024-07-02 RX ORDER — RAMIPRIL 10 MG/1
20 CAPSULE ORAL DAILY
Qty: 180 CAPSULE | Refills: 1 | Status: SHIPPED | OUTPATIENT
Start: 2024-07-02

## 2024-07-10 ENCOUNTER — OFFICE VISIT (OUTPATIENT)
Age: 74
End: 2024-07-10
Payer: MEDICARE

## 2024-07-10 VITALS
HEART RATE: 55 BPM | TEMPERATURE: 98.5 F | DIASTOLIC BLOOD PRESSURE: 70 MMHG | SYSTOLIC BLOOD PRESSURE: 155 MMHG | WEIGHT: 259.8 LBS | OXYGEN SATURATION: 96 % | HEIGHT: 72 IN | BODY MASS INDEX: 35.19 KG/M2 | RESPIRATION RATE: 18 BRPM

## 2024-07-10 DIAGNOSIS — K21.00 GASTROESOPHAGEAL REFLUX DISEASE WITH ESOPHAGITIS WITHOUT HEMORRHAGE: ICD-10-CM

## 2024-07-10 DIAGNOSIS — I10 ESSENTIAL HYPERTENSION, BENIGN: Primary | ICD-10-CM

## 2024-07-10 DIAGNOSIS — Z12.11 SCREEN FOR COLON CANCER: ICD-10-CM

## 2024-07-10 DIAGNOSIS — J45.20 MILD INTERMITTENT ASTHMA WITHOUT COMPLICATION: ICD-10-CM

## 2024-07-10 DIAGNOSIS — Z79.4 TYPE 2 DIABETES MELLITUS WITHOUT COMPLICATION, WITH LONG-TERM CURRENT USE OF INSULIN (HCC): ICD-10-CM

## 2024-07-10 DIAGNOSIS — E11.9 TYPE 2 DIABETES MELLITUS WITHOUT COMPLICATION, WITH LONG-TERM CURRENT USE OF INSULIN (HCC): ICD-10-CM

## 2024-07-10 DIAGNOSIS — R42 DIZZINESS: ICD-10-CM

## 2024-07-10 DIAGNOSIS — J30.89 NON-SEASONAL ALLERGIC RHINITIS, UNSPECIFIED TRIGGER: ICD-10-CM

## 2024-07-10 PROCEDURE — 3046F HEMOGLOBIN A1C LEVEL >9.0%: CPT | Performed by: FAMILY MEDICINE

## 2024-07-10 PROCEDURE — G8427 DOCREV CUR MEDS BY ELIG CLIN: HCPCS | Performed by: FAMILY MEDICINE

## 2024-07-10 PROCEDURE — 99213 OFFICE O/P EST LOW 20 MIN: CPT | Performed by: FAMILY MEDICINE

## 2024-07-10 PROCEDURE — 3017F COLORECTAL CA SCREEN DOC REV: CPT | Performed by: FAMILY MEDICINE

## 2024-07-10 PROCEDURE — G8417 CALC BMI ABV UP PARAM F/U: HCPCS | Performed by: FAMILY MEDICINE

## 2024-07-10 PROCEDURE — 1123F ACP DISCUSS/DSCN MKR DOCD: CPT | Performed by: FAMILY MEDICINE

## 2024-07-10 PROCEDURE — 2022F DILAT RTA XM EVC RTNOPTHY: CPT | Performed by: FAMILY MEDICINE

## 2024-07-10 PROCEDURE — 1036F TOBACCO NON-USER: CPT | Performed by: FAMILY MEDICINE

## 2024-07-10 PROCEDURE — 3077F SYST BP >= 140 MM HG: CPT | Performed by: FAMILY MEDICINE

## 2024-07-10 PROCEDURE — 3078F DIAST BP <80 MM HG: CPT | Performed by: FAMILY MEDICINE

## 2024-07-10 ASSESSMENT — PATIENT HEALTH QUESTIONNAIRE - PHQ9
SUM OF ALL RESPONSES TO PHQ QUESTIONS 1-9: 0
SUM OF ALL RESPONSES TO PHQ9 QUESTIONS 1 & 2: 0
SUM OF ALL RESPONSES TO PHQ QUESTIONS 1-9: 0
SUM OF ALL RESPONSES TO PHQ QUESTIONS 1-9: 0
2. FEELING DOWN, DEPRESSED OR HOPELESS: NOT AT ALL
SUM OF ALL RESPONSES TO PHQ QUESTIONS 1-9: 0
1. LITTLE INTEREST OR PLEASURE IN DOING THINGS: NOT AT ALL

## 2024-07-10 ASSESSMENT — ENCOUNTER SYMPTOMS
SHORTNESS OF BREATH: 0
CHEST TIGHTNESS: 0
BACK PAIN: 0

## 2024-07-10 NOTE — PROGRESS NOTES
Chief Complaint   Patient presents with    Follow-up     Patient is here today for a 1 month follow up.      1. Have you been to the ER, urgent care clinic since your last visit?  Hospitalized since your last visit?No    2. Have you seen or consulted any other health care providers outside of the [facility] since your last visit?  Include any pap smears or colon screening. No

## 2024-07-10 NOTE — PROGRESS NOTES
NAME:  Hunter Leach   :   1950   MRN:   [id number]     Date/Time:  7/10/2024 5:25 PM  Subjective:f/u hbp,gerd recurrent dizziness.Back pain has resolvedFeeling pretty well,not taking clonidine   HPI   Dizziness  This is a recurrent problem. The current episode started 1 to 4 weeks ago. The problem occurs infrequently. The problem has been improving. Associated symptoms include congestion and coughing. Pertinent negatives include no fatigue or sore throat.    Diabetes   The history is provided by the Patient. This is a new problem. The problem occurs daily. The problem has not changed since onset.   Hypertension    The history is provided by the Patient. This is a chronic problem. The problem has been  gradually worsening.  Pertinent negatives include no orthopnea, no palpitations, no malaise/fatigue, no blurred vision, no peripheral edema and no dizziness.    Cholesterol Problem   The history is provided by the Patient. This is a chronic problem. The problem occurs  daily. The  problem has not changed since onse  Review of Systems   HENT:  Positive for congestion.    Respiratory:  Negative for chest tightness and shortness of breath.    Cardiovascular:  Negative for chest pain and palpitations.   Genitourinary:  Negative for frequency.   Musculoskeletal:  Negative for arthralgias, back pain and gait problem.   Neurological:  Positive for light-headedness.           Medications reviewed:  Current Outpatient Medications   Medication Sig    ramipril (ALTACE) 10 MG capsule Take 2 capsules by mouth daily    montelukast (SINGULAIR) 10 MG tablet Take 1 tablet by mouth nightly    indapamide (LOZOL) 1.25 MG tablet Take 1 tablet by mouth every morning    polyethylene glycol (MIRALAX) 17 g PACK packet Take 17 g by mouth daily as needed    tiZANidine (ZANAFLEX) 2 MG tablet Take 1 tablet by mouth 3 times daily as needed (pain)    atorvastatin (LIPITOR) 10 MG tablet TAKE 1 TABLET BY MOUTH EVERY DAY    bisoprolol

## 2024-08-27 DIAGNOSIS — I10 ESSENTIAL (PRIMARY) HYPERTENSION: ICD-10-CM

## 2024-08-27 RX ORDER — BISOPROLOL FUMARATE 10 MG/1
10 TABLET, FILM COATED ORAL DAILY
Qty: 90 TABLET | Refills: 3 | Status: SHIPPED | OUTPATIENT
Start: 2024-08-27

## 2024-08-27 NOTE — TELEPHONE ENCOUNTER
Last appointment: 7/10/24  Next appointment: 11/6/24  Previous refill encounter(s): 3/14/24 #90 with 1 refill    Requested Prescriptions     Pending Prescriptions Disp Refills    bisoprolol (ZEBETA) 10 MG tablet [Pharmacy Med Name: BISOPROLOL FUMARATE 10 MG TAB] 90 tablet 3     Sig: TAKE 1 TABLET BY MOUTH EVERY DAY         For Pharmacy Admin Tracking Only    Program: Medication Refill  CPA in place:    Recommendation Provided To:   Intervention Detail: New Rx: 1, reason: Patient Preference  Intervention Accepted By:   Gap Closed?:    Time Spent (min): 5

## 2024-09-11 DIAGNOSIS — K21.00 GASTRO-ESOPHAGEAL REFLUX DISEASE WITH ESOPHAGITIS, WITHOUT BLEEDING: ICD-10-CM

## 2024-09-11 DIAGNOSIS — I10 ESSENTIAL (PRIMARY) HYPERTENSION: ICD-10-CM

## 2024-09-11 RX ORDER — OMEPRAZOLE 40 MG/1
CAPSULE, DELAYED RELEASE ORAL DAILY
Qty: 30 CAPSULE | Refills: 0 | Status: SHIPPED | OUTPATIENT
Start: 2024-09-11

## 2024-09-11 RX ORDER — FAMOTIDINE 40 MG/1
TABLET, FILM COATED ORAL
Qty: 30 TABLET | Refills: 0 | Status: SHIPPED | OUTPATIENT
Start: 2024-09-11

## 2024-09-11 RX ORDER — DILTIAZEM HYDROCHLORIDE 300 MG/1
CAPSULE, COATED, EXTENDED RELEASE ORAL DAILY
Qty: 30 CAPSULE | Refills: 0 | Status: SHIPPED | OUTPATIENT
Start: 2024-09-11

## 2024-09-25 DIAGNOSIS — I10 ESSENTIAL (PRIMARY) HYPERTENSION: ICD-10-CM

## 2024-09-25 DIAGNOSIS — I10 ESSENTIAL HYPERTENSION, BENIGN: ICD-10-CM

## 2024-09-25 RX ORDER — BISOPROLOL FUMARATE AND HYDROCHLOROTHIAZIDE 5; 6.25 MG/1; MG/1
1 TABLET ORAL DAILY
Qty: 90 TABLET | Refills: 3 | OUTPATIENT
Start: 2024-09-25

## 2024-09-25 RX ORDER — INDAPAMIDE 1.25 MG/1
1.25 TABLET ORAL EVERY MORNING
Qty: 90 TABLET | Refills: 1 | Status: SHIPPED | OUTPATIENT
Start: 2024-09-25

## 2024-10-02 RX ORDER — RAMIPRIL 10 MG/1
20 CAPSULE ORAL DAILY
Qty: 60 CAPSULE | Refills: 0 | OUTPATIENT
Start: 2024-10-02

## 2024-10-02 NOTE — TELEPHONE ENCOUNTER
Altace was sent on 7/2/24 for #180 with 1 refill    For Pharmacy Admin Tracking Only    Program: Medication Refill  CPA in place:    Recommendation Provided To:   Intervention Detail: Discontinued Rx: 1, reason: Duplicate Therapy  Intervention Accepted By:   Gap Closed?:    Time Spent (min): 5

## 2024-10-07 ENCOUNTER — TELEPHONE (OUTPATIENT)
Age: 74
End: 2024-10-07

## 2024-10-07 NOTE — TELEPHONE ENCOUNTER
Patient having pain in middle of back for past 3 days. Ibuprofen and heat not helping. Patient requesting a prescription to help the pain sent to [facility] [phone].

## 2024-10-20 DIAGNOSIS — I10 ESSENTIAL (PRIMARY) HYPERTENSION: ICD-10-CM

## 2024-10-20 DIAGNOSIS — K21.00 GASTRO-ESOPHAGEAL REFLUX DISEASE WITH ESOPHAGITIS, WITHOUT BLEEDING: ICD-10-CM

## 2024-10-21 RX ORDER — OMEPRAZOLE 40 MG/1
CAPSULE, DELAYED RELEASE ORAL DAILY
Qty: 30 CAPSULE | Refills: 5 | Status: SHIPPED | OUTPATIENT
Start: 2024-10-21

## 2024-10-21 RX ORDER — RAMIPRIL 10 MG/1
20 CAPSULE ORAL DAILY
Qty: 180 CAPSULE | Refills: 1 | Status: SHIPPED | OUTPATIENT
Start: 2024-10-21

## 2024-10-21 RX ORDER — DILTIAZEM HYDROCHLORIDE 300 MG/1
CAPSULE, COATED, EXTENDED RELEASE ORAL DAILY
Qty: 30 CAPSULE | Refills: 5 | Status: SHIPPED | OUTPATIENT
Start: 2024-10-21

## 2024-10-21 RX ORDER — FAMOTIDINE 40 MG/1
TABLET, FILM COATED ORAL
Qty: 30 TABLET | Refills: 5 | Status: SHIPPED | OUTPATIENT
Start: 2024-10-21

## 2024-11-03 NOTE — PROGRESS NOTES
NAME:  Hunter Leach   :   1950   MRN:   [id number]     Date/Time:  11/3/2024 6:55 AM  Subjective:F/U HBP,DM2,Chol,Dizziness,glaucoma.Feeling ok   HPI   Diabetes   The history is provided by the Patient. This is a new problem. The problem occurs daily. The problem has not changed since onset.   Hypertension    The history is provided by the Patient. This is a chronic problem. The problem has been  gradually worsening.  Pertinent negatives include no orthopnea, no palpitations, no malaise/fatigue, no blurred vision, no peripheral edema and no dizziness.    Cholesterol Problem   The history is provided by the Patient. This is a chronic problem. The problem occurs  daily. The  problem has not changed since onse  Review of Systems   HENT:  Negative for congestion.    Respiratory:  Negative for chest tightness.    Cardiovascular:  Negative for chest pain, palpitations and leg swelling.   Gastrointestinal:  Negative for abdominal pain.   Genitourinary:  Negative for difficulty urinating.   Musculoskeletal:  Positive for back pain. Negative for arthralgias.   Neurological:  Negative for dizziness.           Medications reviewed:  Current Outpatient Medications   Medication Sig    famotidine (PEPCID) 40 MG tablet TAKE 1 TABLET BY MOUTH EVERY DAY    ramipril (ALTACE) 10 MG capsule TAKE 2 CAPSULES BY MOUTH EVERY DAY    omeprazole (PRILOSEC) 40 MG delayed release capsule TAKE 1 CAPSULE BY MOUTH EVERY DAY    dilTIAZem (CARDIZEM CD) 300 MG extended release capsule TAKE 1 CAPSULE BY MOUTH EVERY DAY    indapamide (LOZOL) 1.25 MG tablet TAKE 1 TABLET BY MOUTH EVERY DAY IN THE MORNING    bisoprolol (ZEBETA) 10 MG tablet TAKE 1 TABLET BY MOUTH EVERY DAY    montelukast (SINGULAIR) 10 MG tablet Take 1 tablet by mouth nightly    polyethylene glycol (MIRALAX) 17 g PACK packet Take 17 g by mouth daily as needed    tiZANidine (ZANAFLEX) 2 MG tablet Take 1 tablet by mouth 3 times daily as needed (pain)    atorvastatin

## 2024-11-06 ENCOUNTER — OFFICE VISIT (OUTPATIENT)
Age: 74
End: 2024-11-06
Payer: MEDICARE

## 2024-11-06 VITALS
OXYGEN SATURATION: 96 % | BODY MASS INDEX: 35.97 KG/M2 | HEART RATE: 56 BPM | RESPIRATION RATE: 18 BRPM | SYSTOLIC BLOOD PRESSURE: 166 MMHG | TEMPERATURE: 98.2 F | DIASTOLIC BLOOD PRESSURE: 78 MMHG | WEIGHT: 265.2 LBS

## 2024-11-06 DIAGNOSIS — Z12.11 SCREEN FOR COLON CANCER: ICD-10-CM

## 2024-11-06 DIAGNOSIS — K21.00 GASTROESOPHAGEAL REFLUX DISEASE WITH ESOPHAGITIS WITHOUT HEMORRHAGE: ICD-10-CM

## 2024-11-06 DIAGNOSIS — I10 ESSENTIAL (PRIMARY) HYPERTENSION: Primary | ICD-10-CM

## 2024-11-06 DIAGNOSIS — J45.20 MILD INTERMITTENT ASTHMA WITHOUT COMPLICATION: ICD-10-CM

## 2024-11-06 DIAGNOSIS — I10 ESSENTIAL (PRIMARY) HYPERTENSION: ICD-10-CM

## 2024-11-06 DIAGNOSIS — Z79.4 TYPE 2 DIABETES MELLITUS WITHOUT COMPLICATION, WITH LONG-TERM CURRENT USE OF INSULIN (HCC): ICD-10-CM

## 2024-11-06 DIAGNOSIS — E11.9 TYPE 2 DIABETES MELLITUS WITHOUT COMPLICATION, WITH LONG-TERM CURRENT USE OF INSULIN (HCC): ICD-10-CM

## 2024-11-06 DIAGNOSIS — R42 DIZZINESS: ICD-10-CM

## 2024-11-06 LAB — HBA1C MFR BLD: 6.3 %

## 2024-11-06 PROCEDURE — 83036 HEMOGLOBIN GLYCOSYLATED A1C: CPT | Performed by: FAMILY MEDICINE

## 2024-11-06 PROCEDURE — 99213 OFFICE O/P EST LOW 20 MIN: CPT | Performed by: FAMILY MEDICINE

## 2024-11-06 RX ORDER — IBUPROFEN 800 MG/1
800 TABLET, FILM COATED ORAL EVERY 6 HOURS PRN
COMMUNITY

## 2024-11-06 RX ORDER — ASPIRIN 81 MG/1
81 TABLET, CHEWABLE ORAL DAILY
COMMUNITY

## 2024-11-06 ASSESSMENT — PATIENT HEALTH QUESTIONNAIRE - PHQ9
1. LITTLE INTEREST OR PLEASURE IN DOING THINGS: NOT AT ALL
SUM OF ALL RESPONSES TO PHQ QUESTIONS 1-9: 0
SUM OF ALL RESPONSES TO PHQ9 QUESTIONS 1 & 2: 0
2. FEELING DOWN, DEPRESSED OR HOPELESS: NOT AT ALL
SUM OF ALL RESPONSES TO PHQ QUESTIONS 1-9: 0

## 2024-11-06 ASSESSMENT — ENCOUNTER SYMPTOMS
ABDOMINAL PAIN: 0
BACK PAIN: 1
CHEST TIGHTNESS: 0

## 2024-11-06 NOTE — PROGRESS NOTES
Chief Complaint   Patient presents with    3 Month Follow-Up       \"Have you been to the ER, urgent care clinic since your last visit?  Hospitalized since your last visit?\"    YES - When: approximately 1 months ago.  Where and Why: [facility] for pulled muscle in back.    “Have you seen or consulted any other health care providers outside of [facility] since your last visit?”    YES - When: approximately 1  weeks ago.  Where and Why: [facility] for Allergy specialist and Eye Doctor.        “Have you had a colorectal cancer screening such as a colonoscopy/FIT/Cologuard?    NO    Date of last Colonoscopy: 2014  No cologuard on file  Date of last FIT: 2020   No flexible sigmoidoscopy on file         Click Here for Release of Records Request       There were no vitals filed for this visit.   Health Maintenance Due   Topic Date Due    Diabetic retinal exam  Never done    DTaP/Tdap/Td vaccine (1 - Tdap) Never done    Shingles vaccine (1 of 2) Never done    Respiratory Syncytial Virus (RSV) Pregnant or age 60 yrs+ (1 - 1-dose 60+ series) Never done    Pneumococcal 65+ years Vaccine (2 of 2 - PPSV23 or PCV20) 2018    Diabetic foot exam  2018    Diabetic Alb to Cr ratio (uACR) test  2024    Lipids  2024    Colorectal Cancer Screen  2024    Flu vaccine (1) 2024    COVID-19 Vaccine ( -  season) 2024        The patient, Hunter Leach, identity was verified by name and .

## 2024-11-07 LAB
ALBUMIN SERPL-MCNC: 3.8 G/DL (ref 3.5–5)
ALBUMIN/GLOB SERPL: 1.1 (ref 1.1–2.2)
ALP SERPL-CCNC: 107 U/L (ref 45–117)
ALT SERPL-CCNC: 24 U/L (ref 12–78)
ANION GAP SERPL CALC-SCNC: 7 MMOL/L (ref 2–12)
AST SERPL-CCNC: 13 U/L (ref 15–37)
BASOPHILS # BLD: 0.1 K/UL (ref 0–0.1)
BASOPHILS NFR BLD: 1 % (ref 0–1)
BILIRUB SERPL-MCNC: 0.9 MG/DL (ref 0.2–1)
BUN SERPL-MCNC: 16 MG/DL (ref 6–20)
BUN/CREAT SERPL: 12 (ref 12–20)
CALCIUM SERPL-MCNC: 9.7 MG/DL (ref 8.5–10.1)
CHLORIDE SERPL-SCNC: 104 MMOL/L (ref 97–108)
CO2 SERPL-SCNC: 30 MMOL/L (ref 21–32)
CREAT SERPL-MCNC: 1.36 MG/DL (ref 0.7–1.3)
DIFFERENTIAL METHOD BLD: NORMAL
EOSINOPHIL # BLD: 0.4 K/UL (ref 0–0.4)
EOSINOPHIL NFR BLD: 5 % (ref 0–7)
ERYTHROCYTE [DISTWIDTH] IN BLOOD BY AUTOMATED COUNT: 13.2 % (ref 11.5–14.5)
GLOBULIN SER CALC-MCNC: 3.4 G/DL (ref 2–4)
GLUCOSE SERPL-MCNC: 91 MG/DL (ref 65–100)
HCT VFR BLD AUTO: 42.4 % (ref 36.6–50.3)
HGB BLD-MCNC: 13.9 G/DL (ref 12.1–17)
IMM GRANULOCYTES # BLD AUTO: 0 K/UL (ref 0–0.04)
IMM GRANULOCYTES NFR BLD AUTO: 0 % (ref 0–0.5)
LYMPHOCYTES # BLD: 2.7 K/UL (ref 0.8–3.5)
LYMPHOCYTES NFR BLD: 37 % (ref 12–49)
MCH RBC QN AUTO: 29.4 PG (ref 26–34)
MCHC RBC AUTO-ENTMCNC: 32.8 G/DL (ref 30–36.5)
MCV RBC AUTO: 89.6 FL (ref 80–99)
MONOCYTES # BLD: 0.8 K/UL (ref 0–1)
MONOCYTES NFR BLD: 11 % (ref 5–13)
NEUTS SEG # BLD: 3.4 K/UL (ref 1.8–8)
NEUTS SEG NFR BLD: 46 % (ref 32–75)
NRBC # BLD: 0 K/UL (ref 0–0.01)
NRBC BLD-RTO: 0 PER 100 WBC
PLATELET # BLD AUTO: 269 K/UL (ref 150–400)
PMV BLD AUTO: 10.6 FL (ref 8.9–12.9)
POTASSIUM SERPL-SCNC: 3.7 MMOL/L (ref 3.5–5.1)
PROT SERPL-MCNC: 7.2 G/DL (ref 6.4–8.2)
RBC # BLD AUTO: 4.73 M/UL (ref 4.1–5.7)
SODIUM SERPL-SCNC: 141 MMOL/L (ref 136–145)
WBC # BLD AUTO: 7.3 K/UL (ref 4.1–11.1)

## 2024-12-02 ENCOUNTER — TELEPHONE (OUTPATIENT)
Age: 74
End: 2024-12-02

## 2024-12-02 NOTE — TELEPHONE ENCOUNTER
Pt is needing a refill on his   bisoprolol (ZEBETA) 10 MG tablet and  dilTIAZem (CARDIZEM CD) 300 MG extended release capsule sent to   [facility]/pharmacy #[id number] - [place], [place] - [address] - P [phone] - F [phone]   He can be reached at [phone] if needed.

## 2024-12-02 NOTE — TELEPHONE ENCOUNTER
Spoke to the pt and he stated he has the mediations and does not need refills.  He read them off to me and they are in the brand name on his bottles.

## 2024-12-27 RX ORDER — RAMIPRIL 10 MG/1
20 CAPSULE ORAL DAILY
Qty: 180 CAPSULE | Refills: 1 | Status: SHIPPED | OUTPATIENT
Start: 2024-12-27

## 2024-12-27 RX ORDER — IBUPROFEN 800 MG/1
800 TABLET, FILM COATED ORAL EVERY 6 HOURS PRN
Qty: 120 TABLET | Refills: 0 | Status: SHIPPED | OUTPATIENT
Start: 2024-12-27

## 2024-12-27 NOTE — TELEPHONE ENCOUNTER
Pt is needing a refill on his   ramipril (ALTACE) 10 MG capsule and   ibuprofen (ADVIL;MOTRIN) 800 MG tablet sent to   [facility]/pharmacy #[id number] - [place], [place] - [address] - P [phone] - F [phone]   ASAP as he is completely out. He can be reached at [phone] if needed.

## 2025-01-08 RX ORDER — IBUPROFEN 800 MG/1
800 TABLET, FILM COATED ORAL EVERY 6 HOURS PRN
Qty: 120 TABLET | Refills: 0 | OUTPATIENT
Start: 2025-01-08

## 2025-01-26 DIAGNOSIS — I10 ESSENTIAL (PRIMARY) HYPERTENSION: ICD-10-CM

## 2025-01-28 ENCOUNTER — OFFICE VISIT (OUTPATIENT)
Age: 75
End: 2025-01-28
Payer: MEDICARE

## 2025-01-28 VITALS
SYSTOLIC BLOOD PRESSURE: 146 MMHG | HEART RATE: 50 BPM | BODY MASS INDEX: 36.03 KG/M2 | DIASTOLIC BLOOD PRESSURE: 73 MMHG | WEIGHT: 266 LBS | RESPIRATION RATE: 18 BRPM | TEMPERATURE: 97.8 F | OXYGEN SATURATION: 95 % | HEIGHT: 72 IN

## 2025-01-28 DIAGNOSIS — E11.9 TYPE 2 DIABETES MELLITUS WITHOUT COMPLICATION, WITH LONG-TERM CURRENT USE OF INSULIN (HCC): ICD-10-CM

## 2025-01-28 DIAGNOSIS — Z01.818 PREOP EXAMINATION: Primary | ICD-10-CM

## 2025-01-28 DIAGNOSIS — H40.9 GLAUCOMA OF BOTH EYES, UNSPECIFIED GLAUCOMA TYPE: ICD-10-CM

## 2025-01-28 DIAGNOSIS — J45.20 MILD INTERMITTENT ASTHMA WITHOUT COMPLICATION: ICD-10-CM

## 2025-01-28 DIAGNOSIS — Z79.4 TYPE 2 DIABETES MELLITUS WITHOUT COMPLICATION, WITH LONG-TERM CURRENT USE OF INSULIN (HCC): ICD-10-CM

## 2025-01-28 DIAGNOSIS — E78.2 MIXED HYPERLIPIDEMIA: ICD-10-CM

## 2025-01-28 DIAGNOSIS — K21.00 GASTRO-ESOPHAGEAL REFLUX DISEASE WITH ESOPHAGITIS, WITHOUT BLEEDING: ICD-10-CM

## 2025-01-28 DIAGNOSIS — I10 ESSENTIAL (PRIMARY) HYPERTENSION: ICD-10-CM

## 2025-01-28 PROCEDURE — 1159F MED LIST DOCD IN RCRD: CPT | Performed by: FAMILY MEDICINE

## 2025-01-28 PROCEDURE — G8417 CALC BMI ABV UP PARAM F/U: HCPCS | Performed by: FAMILY MEDICINE

## 2025-01-28 PROCEDURE — G8427 DOCREV CUR MEDS BY ELIG CLIN: HCPCS | Performed by: FAMILY MEDICINE

## 2025-01-28 PROCEDURE — 1126F AMNT PAIN NOTED NONE PRSNT: CPT | Performed by: FAMILY MEDICINE

## 2025-01-28 PROCEDURE — 1036F TOBACCO NON-USER: CPT | Performed by: FAMILY MEDICINE

## 2025-01-28 PROCEDURE — 99214 OFFICE O/P EST MOD 30 MIN: CPT | Performed by: FAMILY MEDICINE

## 2025-01-28 PROCEDURE — 3078F DIAST BP <80 MM HG: CPT | Performed by: FAMILY MEDICINE

## 2025-01-28 PROCEDURE — 3077F SYST BP >= 140 MM HG: CPT | Performed by: FAMILY MEDICINE

## 2025-01-28 PROCEDURE — 1123F ACP DISCUSS/DSCN MKR DOCD: CPT | Performed by: FAMILY MEDICINE

## 2025-01-28 PROCEDURE — 2022F DILAT RTA XM EVC RTNOPTHY: CPT | Performed by: FAMILY MEDICINE

## 2025-01-28 PROCEDURE — 3017F COLORECTAL CA SCREEN DOC REV: CPT | Performed by: FAMILY MEDICINE

## 2025-01-28 PROCEDURE — 3046F HEMOGLOBIN A1C LEVEL >9.0%: CPT | Performed by: FAMILY MEDICINE

## 2025-01-28 RX ORDER — DORZOLAMIDE HYDROCHLORIDE AND TIMOLOL MALEATE 20; 5 MG/ML; MG/ML
SOLUTION/ DROPS OPHTHALMIC
COMMUNITY
Start: 2024-12-09

## 2025-01-28 RX ORDER — OFLOXACIN 3 MG/ML
1 SOLUTION/ DROPS OPHTHALMIC 3 TIMES DAILY
COMMUNITY
Start: 2025-01-21 | End: 2025-02-11

## 2025-01-28 RX ORDER — BRIMONIDINE TARTRATE 2 MG/ML
SOLUTION/ DROPS OPHTHALMIC
COMMUNITY
Start: 2025-01-02 | End: 2025-01-28 | Stop reason: CLARIF

## 2025-01-28 RX ORDER — BISOPROLOL FUMARATE AND HYDROCHLOROTHIAZIDE 5; 6.25 MG/1; MG/1
1 TABLET ORAL DAILY
Qty: 90 TABLET | Refills: 1 | OUTPATIENT
Start: 2025-01-28

## 2025-01-28 SDOH — ECONOMIC STABILITY: FOOD INSECURITY: WITHIN THE PAST 12 MONTHS, YOU WORRIED THAT YOUR FOOD WOULD RUN OUT BEFORE YOU GOT MONEY TO BUY MORE.: NEVER TRUE

## 2025-01-28 SDOH — ECONOMIC STABILITY: FOOD INSECURITY: WITHIN THE PAST 12 MONTHS, THE FOOD YOU BOUGHT JUST DIDN'T LAST AND YOU DIDN'T HAVE MONEY TO GET MORE.: NEVER TRUE

## 2025-01-28 ASSESSMENT — PATIENT HEALTH QUESTIONNAIRE - PHQ9
SUM OF ALL RESPONSES TO PHQ9 QUESTIONS 1 & 2: 0
1. LITTLE INTEREST OR PLEASURE IN DOING THINGS: NOT AT ALL
SUM OF ALL RESPONSES TO PHQ QUESTIONS 1-9: 0
2. FEELING DOWN, DEPRESSED OR HOPELESS: NOT AT ALL
SUM OF ALL RESPONSES TO PHQ QUESTIONS 1-9: 0

## 2025-01-28 NOTE — PROGRESS NOTES
Chief Complaint   Patient presents with    Pre-op Exam     Patient is here today for his pre-op exam for his upcoming right eye surgery on 2/5/25.     \"Have you been to the ER, urgent care clinic since your last visit?  Hospitalized since your last visit?\"    NO    “Have you seen or consulted any other health care providers outside of [facility] since your last visit?”    NO        “Have you had a colorectal cancer screening such as a colonoscopy/FIT/Cologuard?    NO    Date of last Colonoscopy: 2/17/2014  No cologuard on file  Date of last FIT: 9/23/2020   No flexible sigmoidoscopy on file         Click Here for Release of Records Request    
   Multiple Vitamin (DAILY VITES) TABS Take 1 tablet by mouth daily    diclofenac sodium (VOLTAREN) 1 % GEL Apply 2 g topically 4 times daily as needed for Pain    albuterol sulfate HFA (PROVENTIL;VENTOLIN;PROAIR) 108 (90 Base) MCG/ACT inhaler Inhale 2 puffs into the lungs every 6 hours as needed    bimatoprost (LUMIGAN) 0.01 % SOLN ophthalmic drops Apply 1 drop to eye    brimonidine-timolol (COMBIGAN) 0.2-0.5 % ophthalmic solution Apply 1 drop to eye 3 times daily    EPINEPHrine (EPIPEN) 0.3 MG/0.3ML SOAJ injection INJECT INTRAMUSCULARLY ONCE. MAY REPEAT IF NECESSARY    fexofenadine (ALLEGRA) 180 MG tablet Take by mouth    brimonidine (ALPHAGAN) 0.2 % ophthalmic solution ADMINISTER 1 DROP INTO BOTH EYES 3 TIMES A DAY. (Patient not taking: Reported on 1/28/2025)    ofloxacin (OCUFLOX) 0.3 % solution 1 drop 3 times daily (Patient not taking: Reported on 1/28/2025)    ketotifen (ZADITOR) 0.025 % ophthalmic solution 1 drop 2 times daily (Patient not taking: Reported on 11/6/2024)    dorzolamide (TRUSOPT) 2 % ophthalmic solution ADMINISTER 1 DROP INTO THE LEFT EYE 2 TIMES DAILY. (Patient not taking: Reported on 1/28/2025)    dextromethorphan-guaiFENesin (MUCINEX DM)  MG per extended release tablet Take 1 tablet by mouth 2 times daily (Patient not taking: Reported on 11/6/2024)     No current facility-administered medications for this visit.        Objective:   Vitals:  BP (!) 146/73 (Site: Right Upper Arm, Position: Sitting, Cuff Size: Large Adult)   Pulse 50   Temp 97.8 °F (36.6 °C) (Infrared)   Resp 18   Ht 1.829 m (6')   Wt 120.7 kg (266 lb)   SpO2 95%   BMI 36.08 kg/m²        PHYSICAL EXAM:  General:     Alert, cooperative, no distress, appears stated age.     Head:    Normocephalic, without obvious abnormality, atraumatic.  Ears:   External canals WNL TMs WNL   Eyes:    Conjunctivae/corneas clear.  PERRLA  Nose:   Nares normal. No drainage or sinus tenderness.  Throat:     Lips, mucosa, and tongue

## 2025-01-28 NOTE — TELEPHONE ENCOUNTER
Ziac was d/c on 7/10/24. Current therapy with Zebeta 10mg.    For Pharmacy Admin Tracking Only    Program: Medication Refill  CPA in place:    Recommendation Provided To:   Intervention Detail: Discontinued Rx: 1, reason: Therapy Complete  Intervention Accepted By:   Gap Closed?:    Time Spent (min): 5

## 2025-01-30 ENCOUNTER — TELEPHONE (OUTPATIENT)
Age: 75
End: 2025-01-30

## 2025-01-30 DIAGNOSIS — J11.1 FLU: Primary | ICD-10-CM

## 2025-01-30 RX ORDER — DEXTROMETHORPHAN HYDROBROMIDE AND PROMETHAZINE HYDROCHLORIDE 15; 6.25 MG/5ML; MG/5ML
5 SYRUP ORAL 4 TIMES DAILY PRN
Qty: 240 ML | Refills: 0 | Status: SHIPPED | OUTPATIENT
Start: 2025-01-30 | End: 2025-02-11

## 2025-01-30 RX ORDER — OSELTAMIVIR PHOSPHATE 75 MG/1
75 CAPSULE ORAL 2 TIMES DAILY
Qty: 10 CAPSULE | Refills: 0 | Status: SHIPPED | OUTPATIENT
Start: 2025-01-30 | End: 2025-02-04

## 2025-01-30 NOTE — TELEPHONE ENCOUNTER
Patient states he has cough/ runny nose/ some sob/ chills/ body aches.  He said his wife was in yesterday and she has the flu.  Please give him a call @ [phone]    Cell [phone]

## 2025-02-26 DIAGNOSIS — I10 ESSENTIAL (PRIMARY) HYPERTENSION: ICD-10-CM

## 2025-02-27 PROBLEM — E66.812 OBESITY, CLASS II, BMI 35-39.9, ISOLATED (SEE ACTUAL BMI): Status: ACTIVE | Noted: 2025-02-27

## 2025-02-27 PROBLEM — E66.01 SEVERE OBESITY: Status: RESOLVED | Noted: 2018-12-07 | Resolved: 2025-02-27

## 2025-02-27 RX ORDER — DILTIAZEM HYDROCHLORIDE 300 MG/1
CAPSULE, COATED, EXTENDED RELEASE ORAL DAILY
Qty: 90 CAPSULE | Refills: 3 | Status: SHIPPED | OUTPATIENT
Start: 2025-02-27

## 2025-02-28 ENCOUNTER — CLINICAL DOCUMENTATION (OUTPATIENT)
Age: 75
End: 2025-02-28

## 2025-02-28 ENCOUNTER — OFFICE VISIT (OUTPATIENT)
Age: 75
End: 2025-02-28
Payer: MEDICARE

## 2025-02-28 VITALS
RESPIRATION RATE: 18 BRPM | BODY MASS INDEX: 35.97 KG/M2 | TEMPERATURE: 97.8 F | HEIGHT: 72 IN | SYSTOLIC BLOOD PRESSURE: 133 MMHG | OXYGEN SATURATION: 95 % | WEIGHT: 265.6 LBS | DIASTOLIC BLOOD PRESSURE: 72 MMHG | HEART RATE: 56 BPM

## 2025-02-28 DIAGNOSIS — Z01.818 PREOP EXAMINATION: Primary | ICD-10-CM

## 2025-02-28 DIAGNOSIS — Z79.4 TYPE 2 DIABETES MELLITUS WITHOUT COMPLICATION, WITH LONG-TERM CURRENT USE OF INSULIN (HCC): ICD-10-CM

## 2025-02-28 DIAGNOSIS — I67.1 CEREBRAL ANEURYSM, NONRUPTURED: ICD-10-CM

## 2025-02-28 DIAGNOSIS — E78.2 MIXED HYPERLIPIDEMIA: ICD-10-CM

## 2025-02-28 DIAGNOSIS — I10 ESSENTIAL (PRIMARY) HYPERTENSION: ICD-10-CM

## 2025-02-28 DIAGNOSIS — K21.00 GASTRO-ESOPHAGEAL REFLUX DISEASE WITH ESOPHAGITIS, WITHOUT BLEEDING: ICD-10-CM

## 2025-02-28 DIAGNOSIS — E11.9 TYPE 2 DIABETES MELLITUS WITHOUT COMPLICATION, WITH LONG-TERM CURRENT USE OF INSULIN (HCC): ICD-10-CM

## 2025-02-28 DIAGNOSIS — J45.20 MILD INTERMITTENT ASTHMA WITHOUT COMPLICATION: ICD-10-CM

## 2025-02-28 PROCEDURE — 3078F DIAST BP <80 MM HG: CPT | Performed by: FAMILY MEDICINE

## 2025-02-28 PROCEDURE — 2022F DILAT RTA XM EVC RTNOPTHY: CPT | Performed by: FAMILY MEDICINE

## 2025-02-28 PROCEDURE — 1126F AMNT PAIN NOTED NONE PRSNT: CPT | Performed by: FAMILY MEDICINE

## 2025-02-28 PROCEDURE — 3017F COLORECTAL CA SCREEN DOC REV: CPT | Performed by: FAMILY MEDICINE

## 2025-02-28 PROCEDURE — G8417 CALC BMI ABV UP PARAM F/U: HCPCS | Performed by: FAMILY MEDICINE

## 2025-02-28 PROCEDURE — 3046F HEMOGLOBIN A1C LEVEL >9.0%: CPT | Performed by: FAMILY MEDICINE

## 2025-02-28 PROCEDURE — 1123F ACP DISCUSS/DSCN MKR DOCD: CPT | Performed by: FAMILY MEDICINE

## 2025-02-28 PROCEDURE — 1036F TOBACCO NON-USER: CPT | Performed by: FAMILY MEDICINE

## 2025-02-28 PROCEDURE — 99214 OFFICE O/P EST MOD 30 MIN: CPT | Performed by: FAMILY MEDICINE

## 2025-02-28 PROCEDURE — 3075F SYST BP GE 130 - 139MM HG: CPT | Performed by: FAMILY MEDICINE

## 2025-02-28 PROCEDURE — G8427 DOCREV CUR MEDS BY ELIG CLIN: HCPCS | Performed by: FAMILY MEDICINE

## 2025-02-28 PROCEDURE — 1159F MED LIST DOCD IN RCRD: CPT | Performed by: FAMILY MEDICINE

## 2025-02-28 RX ORDER — DORZOLAMIDE HCL 20 MG/ML
SOLUTION/ DROPS OPHTHALMIC
COMMUNITY
Start: 2025-02-12 | End: 2025-02-28

## 2025-02-28 RX ORDER — OFLOXACIN 3 MG/ML
1 SOLUTION/ DROPS OPHTHALMIC 3 TIMES DAILY
COMMUNITY
Start: 2025-02-27 | End: 2025-03-20

## 2025-02-28 ASSESSMENT — PATIENT HEALTH QUESTIONNAIRE - PHQ9
SUM OF ALL RESPONSES TO PHQ QUESTIONS 1-9: 0
SUM OF ALL RESPONSES TO PHQ QUESTIONS 1-9: 0
2. FEELING DOWN, DEPRESSED OR HOPELESS: NOT AT ALL
1. LITTLE INTEREST OR PLEASURE IN DOING THINGS: NOT AT ALL
SUM OF ALL RESPONSES TO PHQ QUESTIONS 1-9: 0
SUM OF ALL RESPONSES TO PHQ9 QUESTIONS 1 & 2: 0
SUM OF ALL RESPONSES TO PHQ QUESTIONS 1-9: 0

## 2025-02-28 ASSESSMENT — ENCOUNTER SYMPTOMS
ABDOMINAL DISTENTION: 0
SHORTNESS OF BREATH: 0
ANAL BLEEDING: 0
ABDOMINAL PAIN: 0
WHEEZING: 0
CHEST TIGHTNESS: 0

## 2025-02-28 NOTE — PROGRESS NOTES
Chief Complaint   Patient presents with    Pre-op Exam     Patient is here today for a pre-op exam.      \"Have you been to the ER, urgent care clinic since your last visit?  Hospitalized since your last visit?\"    NO    “Have you seen or consulted any other health care providers outside of [facility] since your last visit?”    NO        “Have you had a colorectal cancer screening such as a colonoscopy/FIT/Cologuard?    NO    Date of last Colonoscopy: 2/17/2014  No cologuard on file  Date of last FIT: 9/23/2020   No flexible sigmoidoscopy on file         Click Here for Release of Records Request

## 2025-02-28 NOTE — PROGRESS NOTES
Pt H&P and office notes were faxed to [facility] at [phone] and [phone].

## 2025-02-28 NOTE — PROGRESS NOTES
NAME:  Hunter Leach   :   1950   MRN:   [id number]     Date/Time:  2025 9:48 AM  Subjective: preop eye surgerey for glaucoma,f/u hbp,dm2 ,asthma,s/p coiling of cerebal aneurysm,ar.Doing well   HPI   Eye Problem   The right eye is affected. This is a chronic problem. The problem occurs daily. The problem has been unchanged.      Diabetes   The history is provided by the Patient. This is a new problem. The problem occurs daily. The problem has not changed since onset.   Hypertension    The history is provided by the Patient. This is a chronic problem. The problem has been  gradually worsening.  Pertinent negatives include no orthopnea, no palpitations, no malaise/fatigue, no blurred vision, no peripheral edema and no dizziness.    Cholesterol Problem   The history is provided by the Patient. This is a chronic problem. The problem occurs  daily. The  problem has not changed since onse  Review of Systems     Review of Systems   Constitutional:  Negative for fatigue and fever.   HENT:  Negative for congestion.    Eyes:  Negative for visual disturbance.   Respiratory:  Negative for chest tightness, shortness of breath and wheezing.    Cardiovascular:  Negative for chest pain and palpitations.   Gastrointestinal:  Negative for abdominal distention, abdominal pain and anal bleeding.   Musculoskeletal:  Positive for arthralgias.   Psychiatric/Behavioral:  The patient is nervous/anxious.              Medications reviewed:  Current Outpatient Medications   Medication Sig    ofloxacin (OCUFLOX) 0.3 % solution 1 drop 3 times daily    dilTIAZem (CARDIZEM CD) 300 MG extended release capsule TAKE 1 CAPSULE BY MOUTH EVERY DAY    dorzolamide-timolol (COSOPT) 2-0.5 % ophthalmic solution ADMINISTER 1 DROP INTO BOTH EYES 3 TIMES A DAY.    ibuprofen (ADVIL;MOTRIN) 800 MG tablet Take 1 tablet by mouth every 6 hours as needed for Pain    ramipril (ALTACE) 10 MG capsule Take 2 capsules by mouth daily    aspirin 81 MG

## 2025-03-06 ENCOUNTER — TELEPHONE (OUTPATIENT)
Age: 75
End: 2025-03-06

## 2025-03-06 NOTE — TELEPHONE ENCOUNTER
Spoke to the pt and he stated the medication he received from the pharmacy had Cartia  mg on it.  I explained to the pt that is was the same medication as the diltiazem and Cardizem.  He stated ok.  He had not taken the medication the pharmacy changed the name on the medication.  I explained to the pt that when he  his medications to check the labels and if there is a different name or quantity to ask the pharmacist a bout it.  He stated ok.

## 2025-03-06 NOTE — TELEPHONE ENCOUNTER
Patient requesting a refill on Cardizem  mg sent to [facility] [phone]. Patient can be reached  at [phone]. Pharmacy reports that Cardizem is not on medication. Dr. Acosta sent in prescription  On 2/27/25.

## 2025-03-20 DIAGNOSIS — E78.2 MIXED HYPERLIPIDEMIA: ICD-10-CM

## 2025-03-21 RX ORDER — ATORVASTATIN CALCIUM 10 MG/1
10 TABLET, FILM COATED ORAL DAILY
Qty: 90 TABLET | Refills: 3 | Status: SHIPPED | OUTPATIENT
Start: 2025-03-21

## 2025-03-21 NOTE — TELEPHONE ENCOUNTER
Last appointment: 2/28/25  Next appointment: 4/28/25  Previous refill encounter(s): 3/27/24    Requested Prescriptions     Pending Prescriptions Disp Refills    atorvastatin (LIPITOR) 10 MG tablet [Pharmacy Med Name: ATORVASTATIN 10 MG TABLET] 90 tablet 3     Sig: TAKE 1 TABLET BY MOUTH EVERY DAY         For Pharmacy Admin Tracking Only    Program: Medication Refill  CPA in place:    Recommendation Provided To:   Intervention Detail: New Rx: 1, reason: Patient Preference  Intervention Accepted By:   Gap Closed?:    Time Spent (min): 5

## 2025-05-07 NOTE — PROGRESS NOTES
NAME:  Hunter Leach   :   1950   MRN:   [id number]     Date/Time:  2025 7:04 PM  Subjective: c/o new rt sided mid back pain,no apparent injury.F/U HBPDM,CHOL   HPI   Diabetes   The history is provided by the Patient. This is a new problem. The problem occurs daily. The problem has not changed since onset.   Hypertension    The history is provided by the Patient. This is a chronic problem. The problem has been  gradually worsening.  Pertinent negatives include no orthopnea, no palpitations, no malaise/fatigue, no blurred vision, no peripheral edema and no dizziness.    Cholesterol Problem   The history is provided by the Patient. This is a chronic problem. The problem occurs  daily. The  problem has not changed since onse      Review of Systems   Constitutional:  Negative for activity change and fatigue.   HENT:  Negative for congestion.    Respiratory:  Negative for chest tightness and shortness of breath.    Cardiovascular:  Negative for chest pain and palpitations.   Gastrointestinal:  Negative for abdominal pain and anal bleeding.   Genitourinary:  Negative for hematuria.   Musculoskeletal:  Positive for back pain. Negative for arthralgias and gait problem.   Neurological:  Negative for dizziness.   Psychiatric/Behavioral:  Negative for agitation.              Medications reviewed:  Current Outpatient Medications   Medication Sig    atorvastatin (LIPITOR) 10 MG tablet TAKE 1 TABLET BY MOUTH EVERY DAY    dilTIAZem (CARDIZEM CD) 300 MG extended release capsule TAKE 1 CAPSULE BY MOUTH EVERY DAY    dorzolamide-timolol (COSOPT) 2-0.5 % ophthalmic solution ADMINISTER 1 DROP INTO BOTH EYES 3 TIMES A DAY.    ibuprofen (ADVIL;MOTRIN) 800 MG tablet Take 1 tablet by mouth every 6 hours as needed for Pain    ramipril (ALTACE) 10 MG capsule Take 2 capsules by mouth daily    aspirin 81 MG chewable tablet Take 1 tablet by mouth daily    famotidine (PEPCID) 40 MG tablet TAKE 1 TABLET BY MOUTH EVERY DAY

## 2025-05-09 ENCOUNTER — OFFICE VISIT (OUTPATIENT)
Age: 75
End: 2025-05-09
Payer: MEDICARE

## 2025-05-09 VITALS
WEIGHT: 265.6 LBS | RESPIRATION RATE: 18 BRPM | TEMPERATURE: 97.9 F | BODY MASS INDEX: 35.97 KG/M2 | OXYGEN SATURATION: 95 % | HEIGHT: 72 IN | HEART RATE: 54 BPM | DIASTOLIC BLOOD PRESSURE: 82 MMHG | SYSTOLIC BLOOD PRESSURE: 144 MMHG

## 2025-05-09 DIAGNOSIS — Z12.11 SCREEN FOR COLON CANCER: ICD-10-CM

## 2025-05-09 DIAGNOSIS — Z12.5 SCREENING FOR MALIGNANT NEOPLASM OF PROSTATE: ICD-10-CM

## 2025-05-09 DIAGNOSIS — I10 ESSENTIAL (PRIMARY) HYPERTENSION: ICD-10-CM

## 2025-05-09 DIAGNOSIS — E66.01 MORBID (SEVERE) OBESITY DUE TO EXCESS CALORIES (HCC): ICD-10-CM

## 2025-05-09 DIAGNOSIS — Z79.4 TYPE 2 DIABETES MELLITUS WITHOUT COMPLICATION, WITH LONG-TERM CURRENT USE OF INSULIN (HCC): ICD-10-CM

## 2025-05-09 DIAGNOSIS — E78.2 MIXED HYPERLIPIDEMIA: ICD-10-CM

## 2025-05-09 DIAGNOSIS — M54.50 LOW BACK PAIN WITHOUT SCIATICA, UNSPECIFIED BACK PAIN LATERALITY, UNSPECIFIED CHRONICITY: Primary | ICD-10-CM

## 2025-05-09 DIAGNOSIS — E11.9 TYPE 2 DIABETES MELLITUS WITHOUT COMPLICATION, WITH LONG-TERM CURRENT USE OF INSULIN (HCC): ICD-10-CM

## 2025-05-09 DIAGNOSIS — K21.00 GASTRO-ESOPHAGEAL REFLUX DISEASE WITH ESOPHAGITIS, WITHOUT BLEEDING: ICD-10-CM

## 2025-05-09 LAB
ALBUMIN SERPL-MCNC: 3.8 G/DL (ref 3.5–5)
ALBUMIN/GLOB SERPL: 1 (ref 1.1–2.2)
ALP SERPL-CCNC: 105 U/L (ref 45–117)
ALT SERPL-CCNC: 32 U/L (ref 12–78)
ANION GAP SERPL CALC-SCNC: 6 MMOL/L (ref 2–12)
AST SERPL-CCNC: 23 U/L (ref 15–37)
BASOPHILS # BLD: 0.07 K/UL (ref 0–0.1)
BASOPHILS NFR BLD: 1 % (ref 0–1)
BILIRUB SERPL-MCNC: 1.1 MG/DL (ref 0.2–1)
BUN SERPL-MCNC: 10 MG/DL (ref 6–20)
BUN/CREAT SERPL: 9 (ref 12–20)
CALCIUM SERPL-MCNC: 9.9 MG/DL (ref 8.5–10.1)
CHLORIDE SERPL-SCNC: 102 MMOL/L (ref 97–108)
CO2 SERPL-SCNC: 30 MMOL/L (ref 21–32)
CREAT SERPL-MCNC: 1.17 MG/DL (ref 0.7–1.3)
CREAT UR-MCNC: 212 MG/DL
DIFFERENTIAL METHOD BLD: NORMAL
EOSINOPHIL # BLD: 0.23 K/UL (ref 0–0.4)
EOSINOPHIL NFR BLD: 3.1 % (ref 0–7)
ERYTHROCYTE [DISTWIDTH] IN BLOOD BY AUTOMATED COUNT: 12.8 % (ref 11.5–14.5)
GLOBULIN SER CALC-MCNC: 3.8 G/DL (ref 2–4)
GLUCOSE SERPL-MCNC: 125 MG/DL (ref 65–100)
HBA1C MFR BLD: 6.4 %
HCT VFR BLD AUTO: 41.4 % (ref 36.6–50.3)
HGB BLD-MCNC: 13.9 G/DL (ref 12.1–17)
IMM GRANULOCYTES # BLD AUTO: 0.01 K/UL (ref 0–0.04)
IMM GRANULOCYTES NFR BLD AUTO: 0.1 % (ref 0–0.5)
LYMPHOCYTES # BLD: 2.2 K/UL (ref 0.8–3.5)
LYMPHOCYTES NFR BLD: 29.9 % (ref 12–49)
MCH RBC QN AUTO: 29.5 PG (ref 26–34)
MCHC RBC AUTO-ENTMCNC: 33.6 G/DL (ref 30–36.5)
MCV RBC AUTO: 87.9 FL (ref 80–99)
MICROALBUMIN UR-MCNC: 1.3 MG/DL
MICROALBUMIN/CREAT UR-RTO: 6 MG/G (ref 0–30)
MONOCYTES # BLD: 0.7 K/UL (ref 0–1)
MONOCYTES NFR BLD: 9.5 % (ref 5–13)
NEUTS SEG # BLD: 4.14 K/UL (ref 1.8–8)
NEUTS SEG NFR BLD: 56.4 % (ref 32–75)
NRBC # BLD: 0 K/UL (ref 0–0.01)
NRBC BLD-RTO: 0 PER 100 WBC
PLATELET # BLD AUTO: 259 K/UL (ref 150–400)
PMV BLD AUTO: 10.3 FL (ref 8.9–12.9)
POTASSIUM SERPL-SCNC: 3.8 MMOL/L (ref 3.5–5.1)
PROT SERPL-MCNC: 7.6 G/DL (ref 6.4–8.2)
PSA SERPL-MCNC: 2.5 NG/ML (ref 0.01–4)
RBC # BLD AUTO: 4.71 M/UL (ref 4.1–5.7)
SODIUM SERPL-SCNC: 138 MMOL/L (ref 136–145)
WBC # BLD AUTO: 7.4 K/UL (ref 4.1–11.1)

## 2025-05-09 PROCEDURE — 83036 HEMOGLOBIN GLYCOSYLATED A1C: CPT | Performed by: FAMILY MEDICINE

## 2025-05-09 PROCEDURE — PBSHW AMB POC HEMOGLOBIN A1C: Performed by: FAMILY MEDICINE

## 2025-05-09 PROCEDURE — 1159F MED LIST DOCD IN RCRD: CPT | Performed by: FAMILY MEDICINE

## 2025-05-09 PROCEDURE — 3046F HEMOGLOBIN A1C LEVEL >9.0%: CPT | Performed by: FAMILY MEDICINE

## 2025-05-09 PROCEDURE — 3044F HG A1C LEVEL LT 7.0%: CPT | Performed by: FAMILY MEDICINE

## 2025-05-09 PROCEDURE — 3079F DIAST BP 80-89 MM HG: CPT | Performed by: FAMILY MEDICINE

## 2025-05-09 PROCEDURE — 1036F TOBACCO NON-USER: CPT | Performed by: FAMILY MEDICINE

## 2025-05-09 PROCEDURE — 99214 OFFICE O/P EST MOD 30 MIN: CPT | Performed by: FAMILY MEDICINE

## 2025-05-09 PROCEDURE — G8427 DOCREV CUR MEDS BY ELIG CLIN: HCPCS | Performed by: FAMILY MEDICINE

## 2025-05-09 PROCEDURE — 1125F AMNT PAIN NOTED PAIN PRSNT: CPT | Performed by: FAMILY MEDICINE

## 2025-05-09 PROCEDURE — 3017F COLORECTAL CA SCREEN DOC REV: CPT | Performed by: FAMILY MEDICINE

## 2025-05-09 PROCEDURE — 1123F ACP DISCUSS/DSCN MKR DOCD: CPT | Performed by: FAMILY MEDICINE

## 2025-05-09 PROCEDURE — 3077F SYST BP >= 140 MM HG: CPT | Performed by: FAMILY MEDICINE

## 2025-05-09 PROCEDURE — G8417 CALC BMI ABV UP PARAM F/U: HCPCS | Performed by: FAMILY MEDICINE

## 2025-05-09 PROCEDURE — 2022F DILAT RTA XM EVC RTNOPTHY: CPT | Performed by: FAMILY MEDICINE

## 2025-05-09 RX ORDER — BRIMONIDINE TARTRATE 2 MG/ML
SOLUTION/ DROPS OPHTHALMIC
COMMUNITY
Start: 2025-03-26

## 2025-05-09 RX ORDER — METHOCARBAMOL 500 MG/1
500 TABLET, FILM COATED ORAL 3 TIMES DAILY PRN
Qty: 30 TABLET | Refills: 1 | Status: SHIPPED | OUTPATIENT
Start: 2025-05-09 | End: 2025-05-29

## 2025-05-09 RX ORDER — FAMOTIDINE 40 MG/1
40 TABLET, FILM COATED ORAL DAILY
Qty: 30 TABLET | Refills: 5 | Status: SHIPPED | OUTPATIENT
Start: 2025-05-09

## 2025-05-09 RX ORDER — PREDNISOLONE ACETATE 10 MG/ML
SUSPENSION/ DROPS OPHTHALMIC
COMMUNITY
Start: 2025-03-19

## 2025-05-09 ASSESSMENT — ENCOUNTER SYMPTOMS
SHORTNESS OF BREATH: 0
BACK PAIN: 1
ANAL BLEEDING: 0
CHEST TIGHTNESS: 0

## 2025-05-09 ASSESSMENT — PATIENT HEALTH QUESTIONNAIRE - PHQ9
SUM OF ALL RESPONSES TO PHQ QUESTIONS 1-9: 0
2. FEELING DOWN, DEPRESSED OR HOPELESS: NOT AT ALL
SUM OF ALL RESPONSES TO PHQ QUESTIONS 1-9: 0
1. LITTLE INTEREST OR PLEASURE IN DOING THINGS: NOT AT ALL

## 2025-05-09 NOTE — PROGRESS NOTES
Chief Complaint   Patient presents with    Back Pain     Patient is here today for back pain x 3 days.      \"Have you been to the ER, urgent care clinic since your last visit?  Hospitalized since your last visit?\"    NO    “Have you seen or consulted any other health care providers outside of [facility] since your last visit?”    Eye Doctor for right eye surgery        “Have you had a colorectal cancer screening such as a colonoscopy/FIT/Cologuard?    NO    Date of last Colonoscopy: 2/17/2014  No cologuard on file  Date of last FIT: 9/23/2020   No flexible sigmoidoscopy on file         Click Here for Release of Records Request

## 2025-05-13 DIAGNOSIS — I10 ESSENTIAL HYPERTENSION, BENIGN: ICD-10-CM

## 2025-05-14 RX ORDER — INDAPAMIDE 1.25 MG/1
1.25 TABLET ORAL EVERY MORNING
Qty: 90 TABLET | Refills: 1 | Status: SHIPPED | OUTPATIENT
Start: 2025-05-14

## 2025-05-14 NOTE — TELEPHONE ENCOUNTER
Last appointment: 5/9/25  Next appointment: 8/13/25  Previous refill encounter(s): 9/25/24 #90 with 1 refill    Requested Prescriptions     Pending Prescriptions Disp Refills    indapamide (LOZOL) 1.25 MG tablet [Pharmacy Med Name: INDAPAMIDE 1.25 MG TABLET] 90 tablet 1     Sig: TAKE 1 TABLET BY MOUTH EVERY DAY IN THE MORNING         For Pharmacy Admin Tracking Only    Program: Medication Refill  CPA in place:    Recommendation Provided To:   Intervention Detail: New Rx: 1, reason: Patient Preference  Intervention Accepted By:   Gap Closed?:    Time Spent (min): 5

## 2025-05-20 LAB — HEMOCCULT STL QL IA: NEGATIVE

## 2025-05-21 ENCOUNTER — RESULTS FOLLOW-UP (OUTPATIENT)
Age: 75
End: 2025-05-21

## 2025-05-23 ENCOUNTER — OFFICE VISIT (OUTPATIENT)
Age: 75
End: 2025-05-23
Payer: MEDICARE

## 2025-05-23 VITALS
BODY MASS INDEX: 35.12 KG/M2 | HEART RATE: 54 BPM | DIASTOLIC BLOOD PRESSURE: 78 MMHG | WEIGHT: 265 LBS | TEMPERATURE: 97.8 F | HEIGHT: 73 IN | SYSTOLIC BLOOD PRESSURE: 142 MMHG | RESPIRATION RATE: 16 BRPM

## 2025-05-23 DIAGNOSIS — I10 ESSENTIAL HYPERTENSION, BENIGN: ICD-10-CM

## 2025-05-23 DIAGNOSIS — R00.1 BRADYCARDIA: Primary | ICD-10-CM

## 2025-05-23 DIAGNOSIS — J45.20 MILD INTERMITTENT ASTHMA WITHOUT COMPLICATION: ICD-10-CM

## 2025-05-23 DIAGNOSIS — I67.1 CEREBRAL ANEURYSM, NONRUPTURED: ICD-10-CM

## 2025-05-23 DIAGNOSIS — I10 ESSENTIAL (PRIMARY) HYPERTENSION: ICD-10-CM

## 2025-05-23 PROCEDURE — 3017F COLORECTAL CA SCREEN DOC REV: CPT | Performed by: FAMILY MEDICINE

## 2025-05-23 PROCEDURE — G8428 CUR MEDS NOT DOCUMENT: HCPCS | Performed by: FAMILY MEDICINE

## 2025-05-23 PROCEDURE — G8417 CALC BMI ABV UP PARAM F/U: HCPCS | Performed by: FAMILY MEDICINE

## 2025-05-23 PROCEDURE — 1123F ACP DISCUSS/DSCN MKR DOCD: CPT | Performed by: FAMILY MEDICINE

## 2025-05-23 PROCEDURE — 3077F SYST BP >= 140 MM HG: CPT | Performed by: FAMILY MEDICINE

## 2025-05-23 PROCEDURE — 3078F DIAST BP <80 MM HG: CPT | Performed by: FAMILY MEDICINE

## 2025-05-23 PROCEDURE — 99212 OFFICE O/P EST SF 10 MIN: CPT | Performed by: FAMILY MEDICINE

## 2025-05-23 PROCEDURE — 1036F TOBACCO NON-USER: CPT | Performed by: FAMILY MEDICINE

## 2025-05-23 PROCEDURE — 1126F AMNT PAIN NOTED NONE PRSNT: CPT | Performed by: FAMILY MEDICINE

## 2025-05-23 RX ORDER — BISOPROLOL FUMARATE 10 MG/1
5 TABLET, FILM COATED ORAL DAILY
Qty: 90 TABLET | Refills: 3 | Status: SHIPPED
Start: 2025-05-23

## 2025-05-23 ASSESSMENT — ENCOUNTER SYMPTOMS
CHOKING: 0
CHEST TIGHTNESS: 0
SHORTNESS OF BREATH: 0

## 2025-05-23 NOTE — PROGRESS NOTES
NAME:  Hunter Leach   :   1950   MRN:   [id number]     Date/Time:  2025 1:17 PM  [facility]   HPI   Hypertension    The history is provided by the Patient. This is a chronic problem. The problem has been  gradually worsening.  Pertinent negatives include no orthopnea, no palpitations, no malaise/fatigue, no blurred vision, no peripheral edema and no dizziness.   Review of Systems          Medications reviewed:  Current Outpatient Medications   Medication Sig   • indapamide (LOZOL) 1.25 MG tablet TAKE 1 TABLET BY MOUTH EVERY DAY IN THE MORNING   • brimonidine (ALPHAGAN) 0.2 % ophthalmic solution ADMINISTER 1 DROP INTO BOTH EYES 3 TIMES A DAY.   • prednisoLONE acetate (PRED FORTE) 1 % ophthalmic suspension ADMINISTER 1 DROP INTO THE RIGHT EYE 4 TIMES A DAY.   • famotidine (PEPCID) 40 MG tablet Take 1 tablet by mouth daily   • methocarbamol (ROBAXIN) 500 MG tablet Take 1 tablet by mouth 3 times daily as needed (back pain)   • atorvastatin (LIPITOR) 10 MG tablet TAKE 1 TABLET BY MOUTH EVERY DAY   • dilTIAZem (CARDIZEM CD) 300 MG extended release capsule TAKE 1 CAPSULE BY MOUTH EVERY DAY   • dorzolamide-timolol (COSOPT) 2-0.5 % ophthalmic solution ADMINISTER 1 DROP INTO BOTH EYES 3 TIMES A DAY.   • ramipril (ALTACE) 10 MG capsule Take 2 capsules by mouth daily   • aspirin 81 MG chewable tablet Take 1 tablet by mouth daily   • bisoprolol (ZEBETA) 10 MG tablet TAKE 1 TABLET BY MOUTH EVERY DAY   • polyethylene glycol (MIRALAX) 17 g PACK packet Take 17 g by mouth daily as needed   • Omega-3 Fatty Acids (FISH OIL) 1000 MG capsule Take 2 capsules by mouth 2 times daily   • Multiple Vitamin (DAILY VITES) TABS Take 1 tablet by mouth daily   • albuterol sulfate HFA (PROVENTIL;VENTOLIN;PROAIR) 108 (90 Base) MCG/ACT inhaler Inhale 2 puffs into the lungs every 6 hours as needed   • bimatoprost (LUMIGAN) 0.01 % SOLN ophthalmic drops Apply 1 drop to eye   •

## 2025-05-23 NOTE — PROGRESS NOTES
NAME:  Hunter Leach   :   1950   MRN:   [id number]     Date/Time:  2025 1:32 PM  Subjective: noted to have HR 46 in allergist office today,asymptomatic.Followed for HBP,cerebral Aneurysm,asthma.Feeling well   Irregular Heart Beat   This is a new problem. The current episode started today. The problem has been unchanged. Associated symptoms include an irregular heartbeat. Pertinent negatives include no anxiety, chest pain, malaise/fatigue, shortness of breath or syncope.   Hypertension  This is a chronic problem. The problem is unchanged. The problem is controlled. Pertinent negatives include no chest pain, malaise/fatigue, palpitations or shortness of breath.        Review of Systems   Constitutional:  Negative for malaise/fatigue.   HENT:  Negative for congestion.    Respiratory:  Negative for choking, chest tightness and shortness of breath.    Cardiovascular:  Negative for chest pain, palpitations, leg swelling and syncope.   Psychiatric/Behavioral:  The patient is not nervous/anxious.              Medications reviewed:  Current Outpatient Medications   Medication Sig    bisoprolol (ZEBETA) 10 MG tablet Take 0.5 tablets by mouth daily    indapamide (LOZOL) 1.25 MG tablet TAKE 1 TABLET BY MOUTH EVERY DAY IN THE MORNING    brimonidine (ALPHAGAN) 0.2 % ophthalmic solution ADMINISTER 1 DROP INTO BOTH EYES 3 TIMES A DAY.    prednisoLONE acetate (PRED FORTE) 1 % ophthalmic suspension ADMINISTER 1 DROP INTO THE RIGHT EYE 4 TIMES A DAY.    famotidine (PEPCID) 40 MG tablet Take 1 tablet by mouth daily    methocarbamol (ROBAXIN) 500 MG tablet Take 1 tablet by mouth 3 times daily as needed (back pain)    atorvastatin (LIPITOR) 10 MG tablet TAKE 1 TABLET BY MOUTH EVERY DAY    dilTIAZem (CARDIZEM CD) 300 MG extended release capsule TAKE 1 CAPSULE BY MOUTH EVERY DAY    dorzolamide-timolol (COSOPT) 2-0.5 % ophthalmic solution ADMINISTER 1 DROP INTO BOTH EYES 3 TIMES A DAY.    ramipril (ALTACE) 10 MG

## 2025-05-30 ENCOUNTER — OFFICE VISIT (OUTPATIENT)
Age: 75
End: 2025-05-30
Payer: MEDICARE

## 2025-05-30 VITALS
BODY MASS INDEX: 35.47 KG/M2 | TEMPERATURE: 97.8 F | SYSTOLIC BLOOD PRESSURE: 138 MMHG | WEIGHT: 267.6 LBS | OXYGEN SATURATION: 96 % | HEART RATE: 63 BPM | RESPIRATION RATE: 18 BRPM | DIASTOLIC BLOOD PRESSURE: 78 MMHG | HEIGHT: 73 IN

## 2025-05-30 DIAGNOSIS — R00.1 BRADYCARDIA: Primary | ICD-10-CM

## 2025-05-30 DIAGNOSIS — I67.1 CEREBRAL ANEURYSM, NONRUPTURED: ICD-10-CM

## 2025-05-30 DIAGNOSIS — I10 ESSENTIAL HYPERTENSION, BENIGN: ICD-10-CM

## 2025-05-30 PROCEDURE — 1123F ACP DISCUSS/DSCN MKR DOCD: CPT | Performed by: FAMILY MEDICINE

## 2025-05-30 PROCEDURE — G8417 CALC BMI ABV UP PARAM F/U: HCPCS | Performed by: FAMILY MEDICINE

## 2025-05-30 PROCEDURE — 99212 OFFICE O/P EST SF 10 MIN: CPT | Performed by: FAMILY MEDICINE

## 2025-05-30 PROCEDURE — 1126F AMNT PAIN NOTED NONE PRSNT: CPT | Performed by: FAMILY MEDICINE

## 2025-05-30 PROCEDURE — 3075F SYST BP GE 130 - 139MM HG: CPT | Performed by: FAMILY MEDICINE

## 2025-05-30 PROCEDURE — 1159F MED LIST DOCD IN RCRD: CPT | Performed by: FAMILY MEDICINE

## 2025-05-30 PROCEDURE — G8427 DOCREV CUR MEDS BY ELIG CLIN: HCPCS | Performed by: FAMILY MEDICINE

## 2025-05-30 PROCEDURE — 3078F DIAST BP <80 MM HG: CPT | Performed by: FAMILY MEDICINE

## 2025-05-30 PROCEDURE — 1036F TOBACCO NON-USER: CPT | Performed by: FAMILY MEDICINE

## 2025-05-30 PROCEDURE — 3017F COLORECTAL CA SCREEN DOC REV: CPT | Performed by: FAMILY MEDICINE

## 2025-05-30 ASSESSMENT — ENCOUNTER SYMPTOMS
CHEST TIGHTNESS: 0
SHORTNESS OF BREATH: 0

## 2025-05-30 ASSESSMENT — PATIENT HEALTH QUESTIONNAIRE - PHQ9
2. FEELING DOWN, DEPRESSED OR HOPELESS: NOT AT ALL
SUM OF ALL RESPONSES TO PHQ QUESTIONS 1-9: 0
1. LITTLE INTEREST OR PLEASURE IN DOING THINGS: NOT AT ALL
SUM OF ALL RESPONSES TO PHQ QUESTIONS 1-9: 0

## 2025-05-30 NOTE — PROGRESS NOTES
NAME:  Hunter Leach   :   1950   MRN:   [id number]     Date/Time:  2025 12:25 PM  Subjective: f/u hbp,bradycardia,Bisoprolo diose halved last visit.Feeling well   Hypertension  This is a chronic problem. The problem has been resolved since onset. The problem is controlled. Pertinent negatives include no chest pain, malaise/fatigue, palpitations, peripheral edema or shortness of breath.      Review of Systems   Constitutional:  Negative for malaise/fatigue.   HENT:  Negative for congestion.    Respiratory:  Negative for chest tightness and shortness of breath.    Cardiovascular:  Negative for chest pain, palpitations and leg swelling.             Medications reviewed:  Current Outpatient Medications   Medication Sig    bisoprolol (ZEBETA) 10 MG tablet Take 0.5 tablets by mouth daily    indapamide (LOZOL) 1.25 MG tablet TAKE 1 TABLET BY MOUTH EVERY DAY IN THE MORNING    brimonidine (ALPHAGAN) 0.2 % ophthalmic solution ADMINISTER 1 DROP INTO BOTH EYES 3 TIMES A DAY.    prednisoLONE acetate (PRED FORTE) 1 % ophthalmic suspension ADMINISTER 1 DROP INTO THE RIGHT EYE 4 TIMES A DAY.    famotidine (PEPCID) 40 MG tablet Take 1 tablet by mouth daily    atorvastatin (LIPITOR) 10 MG tablet TAKE 1 TABLET BY MOUTH EVERY DAY    dilTIAZem (CARDIZEM CD) 300 MG extended release capsule TAKE 1 CAPSULE BY MOUTH EVERY DAY    dorzolamide-timolol (COSOPT) 2-0.5 % ophthalmic solution ADMINISTER 1 DROP INTO BOTH EYES 3 TIMES A DAY.    ramipril (ALTACE) 10 MG capsule Take 2 capsules by mouth daily    aspirin 81 MG chewable tablet Take 1 tablet by mouth daily    polyethylene glycol (MIRALAX) 17 g PACK packet Take 17 g by mouth daily as needed    Omega-3 Fatty Acids (FISH OIL) 1000 MG capsule Take 2 capsules by mouth 2 times daily    Multiple Vitamin (DAILY VITES) TABS Take 1 tablet by mouth daily    albuterol sulfate HFA (PROVENTIL;VENTOLIN;PROAIR) 108 (90 Base) MCG/ACT inhaler Inhale 2 puffs into the lungs every 6 hours

## 2025-05-30 NOTE — PROGRESS NOTES
Chief Complaint   Patient presents with    Follow-up     Patient is here today for a 1 week follow up.      \"Have you been to the ER, urgent care clinic since your last visit?  Hospitalized since your last visit?\"    NO    “Have you seen or consulted any other health care providers outside of [facility] since your last visit?”    NO            Click Here for Release of Records Request

## 2025-06-03 ENCOUNTER — TELEPHONE (OUTPATIENT)
Age: 75
End: 2025-06-03

## 2025-06-03 NOTE — TELEPHONE ENCOUNTER
Pt said that his BP is 129/64 and his HR is 47 and he has been having SOB since this morning. Pt said that Dr. Acosta cut one of his medications in half and he thinks that is the cause of all of this. He can be reached at [phone].

## 2025-06-03 NOTE — TELEPHONE ENCOUNTER
Pt said that his BP is 129/64 and his HR is 47 and he has been having SOB since this morning. Pt said that Dr. Acosta cut one of his medications in half and he thinks that is the cause of all of this. He can be reached at [phone].   -------------------------------------------------------------------  Called the pt and he declined to go to the ER for the SOB and stated that he has not taken his BP medication yet for the day.  He also stated he was not taking the medication because of the BP reading above until he speak to .  Pt mentioned that he has been feeling funny every since Dr. Acosta told him to break one of his pills in half.  He can be reached at [phone].

## 2025-07-28 RX ORDER — RAMIPRIL 10 MG/1
20 CAPSULE ORAL DAILY
Qty: 180 CAPSULE | Refills: 3 | Status: SHIPPED | OUTPATIENT
Start: 2025-07-28

## 2025-07-28 RX ORDER — BISOPROLOL FUMARATE AND HYDROCHLOROTHIAZIDE 5; 6.25 MG/1; MG/1
1 TABLET ORAL DAILY
Qty: 90 TABLET | Refills: 3 | OUTPATIENT
Start: 2025-07-28

## 2025-07-28 NOTE — TELEPHONE ENCOUNTER
Last appointment: 5/30/25  Next appointment: 8/13/25, 9/15/25  Previous refill encounter(s): 12/27/24 #180 with 1 refill    Requested Prescriptions     Pending Prescriptions Disp Refills    ramipril (ALTACE) 10 MG capsule [Pharmacy Med Name: RAMIPRIL 10 MG CAPSULE] 180 capsule 3     Sig: TAKE 2 CAPSULES BY MOUTH EVERY DAY         For Pharmacy Admin Tracking Only    Program: Medication Refill  CPA in place:    Recommendation Provided To:   Intervention Detail: New Rx: 1, reason: Patient Preference  Intervention Accepted By:   Gap Closed?:    Time Spent (min): 5

## 2025-07-28 NOTE — TELEPHONE ENCOUNTER
Ziac was d/c on 7/10/24    For Pharmacy Admin Tracking Only    Program: Medication Refill  CPA in place:    Recommendation Provided To:   Intervention Detail: Discontinued Rx: 1, reason: Therapy Complete  Intervention Accepted By:   Gap Closed?:    Time Spent (min): 5

## 2025-08-13 ENCOUNTER — OFFICE VISIT (OUTPATIENT)
Age: 75
End: 2025-08-13
Payer: MEDICARE

## 2025-08-13 VITALS
BODY MASS INDEX: 35.36 KG/M2 | HEART RATE: 75 BPM | OXYGEN SATURATION: 97 % | DIASTOLIC BLOOD PRESSURE: 76 MMHG | SYSTOLIC BLOOD PRESSURE: 138 MMHG | WEIGHT: 266.8 LBS | RESPIRATION RATE: 18 BRPM | TEMPERATURE: 97.6 F | HEIGHT: 73 IN

## 2025-08-13 DIAGNOSIS — Z79.4 TYPE 2 DIABETES MELLITUS WITHOUT COMPLICATION, WITH LONG-TERM CURRENT USE OF INSULIN (HCC): ICD-10-CM

## 2025-08-13 DIAGNOSIS — E78.2 MIXED HYPERLIPIDEMIA: ICD-10-CM

## 2025-08-13 DIAGNOSIS — E11.9 TYPE 2 DIABETES MELLITUS WITHOUT COMPLICATION, WITH LONG-TERM CURRENT USE OF INSULIN (HCC): ICD-10-CM

## 2025-08-13 DIAGNOSIS — Z00.00 MEDICARE ANNUAL WELLNESS VISIT, SUBSEQUENT: Primary | ICD-10-CM

## 2025-08-13 DIAGNOSIS — I10 ESSENTIAL (PRIMARY) HYPERTENSION: ICD-10-CM

## 2025-08-13 LAB — HBA1C MFR BLD: 6.4 %

## 2025-08-13 PROCEDURE — 3078F DIAST BP <80 MM HG: CPT | Performed by: FAMILY MEDICINE

## 2025-08-13 PROCEDURE — G8427 DOCREV CUR MEDS BY ELIG CLIN: HCPCS | Performed by: FAMILY MEDICINE

## 2025-08-13 PROCEDURE — 3044F HG A1C LEVEL LT 7.0%: CPT | Performed by: FAMILY MEDICINE

## 2025-08-13 PROCEDURE — 99213 OFFICE O/P EST LOW 20 MIN: CPT | Performed by: FAMILY MEDICINE

## 2025-08-13 PROCEDURE — PBSHW AMB POC HEMOGLOBIN A1C: Performed by: FAMILY MEDICINE

## 2025-08-13 PROCEDURE — 1123F ACP DISCUSS/DSCN MKR DOCD: CPT | Performed by: FAMILY MEDICINE

## 2025-08-13 PROCEDURE — 3075F SYST BP GE 130 - 139MM HG: CPT | Performed by: FAMILY MEDICINE

## 2025-08-13 PROCEDURE — G8417 CALC BMI ABV UP PARAM F/U: HCPCS | Performed by: FAMILY MEDICINE

## 2025-08-13 PROCEDURE — 83036 HEMOGLOBIN GLYCOSYLATED A1C: CPT | Performed by: FAMILY MEDICINE

## 2025-08-13 PROCEDURE — 2022F DILAT RTA XM EVC RTNOPTHY: CPT | Performed by: FAMILY MEDICINE

## 2025-08-13 PROCEDURE — 3017F COLORECTAL CA SCREEN DOC REV: CPT | Performed by: FAMILY MEDICINE

## 2025-08-13 PROCEDURE — 1126F AMNT PAIN NOTED NONE PRSNT: CPT | Performed by: FAMILY MEDICINE

## 2025-08-13 PROCEDURE — 1159F MED LIST DOCD IN RCRD: CPT | Performed by: FAMILY MEDICINE

## 2025-08-13 PROCEDURE — 3046F HEMOGLOBIN A1C LEVEL >9.0%: CPT | Performed by: FAMILY MEDICINE

## 2025-08-13 PROCEDURE — G0439 PPPS, SUBSEQ VISIT: HCPCS | Performed by: FAMILY MEDICINE

## 2025-08-13 PROCEDURE — 1036F TOBACCO NON-USER: CPT | Performed by: FAMILY MEDICINE

## 2025-08-13 RX ORDER — DORZOLAMIDE HCL 20 MG/ML
SOLUTION/ DROPS OPHTHALMIC
COMMUNITY
Start: 2025-06-02

## 2025-08-13 SDOH — ECONOMIC STABILITY: FOOD INSECURITY: WITHIN THE PAST 12 MONTHS, YOU WORRIED THAT YOUR FOOD WOULD RUN OUT BEFORE YOU GOT MONEY TO BUY MORE.: NEVER TRUE

## 2025-08-13 SDOH — ECONOMIC STABILITY: FOOD INSECURITY: WITHIN THE PAST 12 MONTHS, THE FOOD YOU BOUGHT JUST DIDN'T LAST AND YOU DIDN'T HAVE MONEY TO GET MORE.: NEVER TRUE

## 2025-08-13 ASSESSMENT — ENCOUNTER SYMPTOMS
ANAL BLEEDING: 0
CHEST TIGHTNESS: 0
ABDOMINAL PAIN: 0
ABDOMINAL DISTENTION: 0
BLOOD IN STOOL: 0

## 2025-08-25 ENCOUNTER — TELEPHONE (OUTPATIENT)
Age: 75
End: 2025-08-25

## 2025-08-25 DIAGNOSIS — U07.1 2019 NOVEL CORONAVIRUS DISEASE (COVID-19): Primary | ICD-10-CM

## 2025-08-28 ENCOUNTER — TELEPHONE (OUTPATIENT)
Age: 75
End: 2025-08-28